# Patient Record
Sex: FEMALE | Race: WHITE | NOT HISPANIC OR LATINO | Employment: OTHER | URBAN - METROPOLITAN AREA
[De-identification: names, ages, dates, MRNs, and addresses within clinical notes are randomized per-mention and may not be internally consistent; named-entity substitution may affect disease eponyms.]

---

## 2017-01-18 ENCOUNTER — RECORDS - HEALTHEAST (OUTPATIENT)
Dept: LAB | Facility: CLINIC | Age: 82
End: 2017-01-18

## 2017-01-18 LAB — HBA1C MFR BLD: 7.2 % (ref 4.2–6.1)

## 2017-04-24 ENCOUNTER — HOSPITAL ENCOUNTER (EMERGENCY)
Facility: CLINIC | Age: 82
Discharge: HOME | End: 2017-04-24
Attending: NURSE PRACTITIONER | Admitting: NURSE PRACTITIONER
Payer: MEDICARE

## 2017-04-24 ENCOUNTER — APPOINTMENT (OUTPATIENT)
Dept: CT IMAGING | Facility: CLINIC | Age: 82
End: 2017-04-24
Attending: NURSE PRACTITIONER
Payer: MEDICARE

## 2017-04-24 VITALS
OXYGEN SATURATION: 98 % | DIASTOLIC BLOOD PRESSURE: 72 MMHG | TEMPERATURE: 97.6 F | BODY MASS INDEX: 23.42 KG/M2 | HEART RATE: 87 BPM | WEIGHT: 154 LBS | RESPIRATION RATE: 18 BRPM | SYSTOLIC BLOOD PRESSURE: 139 MMHG
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
## 2017-04-24 DIAGNOSIS — T14.8XXA HEMATOMA: ICD-10-CM

## 2017-04-24 DIAGNOSIS — S70.01XA CONTUSION OF RIGHT HIP, INITIAL ENCOUNTER: ICD-10-CM

## 2017-04-24 LAB
ALBUMIN UR-MCNC: NEGATIVE MG/DL
ANION GAP SERPL CALCULATED.3IONS-SCNC: 8 MMOL/L (ref 3–14)
APPEARANCE UR: CLEAR
BILIRUB UR QL STRIP: NEGATIVE
BUN SERPL-MCNC: 25 MG/DL (ref 7–30)
CALCIUM SERPL-MCNC: 8.7 MG/DL (ref 8.5–10.1)
CHLORIDE SERPL-SCNC: 101 MMOL/L (ref 94–109)
CO2 SERPL-SCNC: 26 MMOL/L (ref 20–32)
COLOR UR AUTO: YELLOW
CREAT SERPL-MCNC: 0.99 MG/DL (ref 0.52–1.04)
ERYTHROCYTE [DISTWIDTH] IN BLOOD BY AUTOMATED COUNT: 13.5 % (ref 10–15)
GFR SERPL CREATININE-BSD FRML MDRD: 52 ML/MIN/1.7M2
GLUCOSE SERPL-MCNC: 141 MG/DL (ref 70–99)
GLUCOSE UR STRIP-MCNC: NEGATIVE MG/DL
HCT VFR BLD AUTO: 32.1 % (ref 35–47)
HGB BLD-MCNC: 10.6 G/DL (ref 11.7–15.7)
HGB UR QL STRIP: NEGATIVE
INTERPRETATION ECG - MUSE: NORMAL
KETONES UR STRIP-MCNC: NEGATIVE MG/DL
LEUKOCYTE ESTERASE UR QL STRIP: ABNORMAL
MCH RBC QN AUTO: 28.6 PG (ref 26.5–33)
MCHC RBC AUTO-ENTMCNC: 33 G/DL (ref 31.5–36.5)
MCV RBC AUTO: 87 FL (ref 78–100)
MUCOUS THREADS #/AREA URNS LPF: PRESENT /LPF
NITRATE UR QL: NEGATIVE
PH UR STRIP: 6 PH (ref 5–7)
PLATELET # BLD AUTO: 207 10E9/L (ref 150–450)
POTASSIUM SERPL-SCNC: 4.3 MMOL/L (ref 3.4–5.3)
RBC # BLD AUTO: 3.7 10E12/L (ref 3.8–5.2)
RBC #/AREA URNS AUTO: 1 /HPF (ref 0–2)
SODIUM SERPL-SCNC: 135 MMOL/L (ref 133–144)
SP GR UR STRIP: 1.01 (ref 1–1.03)
SQUAMOUS #/AREA URNS AUTO: <1 /HPF (ref 0–1)
TROPONIN I SERPL-MCNC: 0.02 UG/L (ref 0–0.04)
URN SPEC COLLECT METH UR: ABNORMAL
UROBILINOGEN UR STRIP-MCNC: 0 MG/DL (ref 0–2)
WBC # BLD AUTO: 7.2 10E9/L (ref 4–11)
WBC #/AREA URNS AUTO: 4 /HPF (ref 0–2)

## 2017-04-24 PROCEDURE — 96361 HYDRATE IV INFUSION ADD-ON: CPT

## 2017-04-24 PROCEDURE — 80048 BASIC METABOLIC PNL TOTAL CA: CPT | Performed by: NURSE PRACTITIONER

## 2017-04-24 PROCEDURE — 81001 URINALYSIS AUTO W/SCOPE: CPT | Performed by: NURSE PRACTITIONER

## 2017-04-24 PROCEDURE — 25000128 H RX IP 250 OP 636: Performed by: NURSE PRACTITIONER

## 2017-04-24 PROCEDURE — 99285 EMERGENCY DEPT VISIT HI MDM: CPT | Mod: 25

## 2017-04-24 PROCEDURE — 84484 ASSAY OF TROPONIN QUANT: CPT | Performed by: NURSE PRACTITIONER

## 2017-04-24 PROCEDURE — 85027 COMPLETE CBC AUTOMATED: CPT | Performed by: NURSE PRACTITIONER

## 2017-04-24 PROCEDURE — 93005 ELECTROCARDIOGRAM TRACING: CPT

## 2017-04-24 PROCEDURE — 70450 CT HEAD/BRAIN W/O DYE: CPT

## 2017-04-24 PROCEDURE — 96360 HYDRATION IV INFUSION INIT: CPT

## 2017-04-24 RX ADMIN — SODIUM CHLORIDE 500 ML: 9 INJECTION, SOLUTION INTRAVENOUS at 10:50

## 2017-04-24 NOTE — ED AVS SNAPSHOT
[facility] Emergency Department    [address]    [facility], [place] [address]    Phone:  [phone]    Fax:  [phone]                                       Olga Zelaya   MRN: [id number]    Department:  [facility] Emergency Department   Date of Visit:  4/24/2017           Patient Information     Date Of Birth          8/23/1923        Your diagnoses for this visit were:     Fall, initial encounter     Contusion of right hip, initial encounter     Hematoma        You were seen by Tashi Mendoza APRN CNP.      Follow-up Information     Follow up with Joe Pelaez In 1 week.    Specialty:  Family Practice    Why:  if continuned symptoms or sooner if worsening    Contact information:    [facility]  [address] [facility] [address]  [phone]          Follow up with [facility] Emergency Department.    Specialty:  EMERGENCY MEDICINE    Why:  If symptoms worsen    Contact information:    [address]  [facility] [id number] [phone]        Discharge Instructions       Tylenol scheduled for the next 3-5 days. Naproxen, 650-1000 mg of Tylenol. Follow directions.  Ice or heat to area.  I recommend ice in the first 24-48 hours, 3-4 times daily.        Bruises (Contusions)    A contusion is a bruise. A bruise happens when a blow to your body doesn't break the skin but does break blood vessels beneath the skin. Blood leaking from the broken vessels causes redness and swelling. As it heals, your bruise is likely to turn colors like purple, green, and yellow. This is normal. The bruise should fade in 2 or 3 weeks.  Factors that make you more likely to bruise  Almost everyone bruises now and then. Certain people do bruise more easily than others. You're more prone to bruising as you get older. That's because blood vessels become more fragile with age. You're also more likely to bruise if you have a clotting disorder such as  hemophilia or take medications that reduce clotting, including aspirin.  When to go to the emergency room (ER)  Bruises almost always heal on their own without special treatment. But for some people, a bad bruise can be serious. Seek medical care if you:    Have a clotting disorder such as hemophilia.    Have cirrhosis or other serious liver disease.    Take blood-thinning medications such as warfarin (Coumadin).  What to expect in the ER  A doctor will examine your bruise and ask about any health conditions you have. In some cases, you may have a test to check how well your blood clots. Other treatment will depend on your needs.  Follow-up care  Sometimes a bruise gets worse instead of better. It may become larger and more swollen. This can occur when your body walls off a small pool of blood under the skin (hematoma). In very rare cases, your doctor may need to drain excess blood from the area.  Tip:  Apply an ice pack or bag of frozen peas to a bruise (keep a thin cloth between the cold source and your skin). This can help reduce redness and swelling.     0053-5782 [employer]. [address] [place] [address]. All rights reserved. This information is not intended as a substitute for professional medical care. Always follow your healthcare professional's instructions.          Uncertain Causes of Fall  You have had a fall today. But the cause of your fall is not certain. Falls can occur due to slipping, tripping or losing your balance. A fall can also occur from a fainting spell or seizure.  While a fall can happen for a simple reason (tripping over something), falls in elderly people are often caused by a combination of things:    Age-related decline in function with worsening balance, stability, vision, and muscle strength    Chronic illness such as heart arrhythmias, heart valve disease, vascular disease, COPD, diabetes, strokes, arthritis    Effects or side effects of  medicines    Dehydration.    Environmental hazards such as uneven or slippery ground, unfamiliar place, obstacles, uneven surfaces, or slippery ground    Situational factors (related to the activity being done, e.g., rushing to the bathroom)  Because the cause of your fall today is not certain, it is possible that a fainting spell or seizure was the cause. This means that it could happen again, without warning. If you fall again, without a cause, then you should return to this facility promptly to have further tests. Otherwise, follow up with your doctor as explained below.  It is normal to feel sore and tight in your muscles and back the next day, and not just the muscles you initially injured. Remember, all the parts of your body are connected, so while initially one area hurts, the next day another may hurt. Also, when you injure yourself, it causes inflammation, which then causes the muscles to tighten up and hurt more. After the initial worsening, it should gradually improve over the next few days. However, more severe pain should be reported.  Even without a definite head injury, you can still get a concussion. Concussions and even bleeding can still occur, especially if you have had a recent injury or take blood thinner medicine. It is not unusual to have a mild headache and feel tired and even nauseous or dizzy.  Home care    Rest today and resume your normal activities as soon as you are feeling back to normal. It is best to remain with someone who can check on you for the next 24 hours to watch for another episode of falling.    If you were injured during the fall, follow the advice from your doctor regarding care of your injury.     If you become light-headed or dizzy, lie down immediately or sit and lean forward with your head down.    As a precaution, do not drive a car or operate dangerous equipment, do not take a bath alone (use a shower instead) and do not swim alone until you see your doctor. A  condition causing fainting or seizures must be ruled out before resuming these activities.    You may use acetaminophen or ibuprofen to control pain, unless another pain medicine was prescribed. If you have chronic liver or kidney disease or ever had a stomach ulcer or gastrointestinal bleeding, talk with your doctor before using these medicines.    Keep your appointments for any further testing that may have been scheduled for you.  Follow-up care  Follow up with your healthcare provider, or as advised.  If X-rays or CT scan were done, you will be notified if there is a change in the reading, especially if it affects treatment.  Call 911  Call 911 if any of these occur:    Trouble breathing    Confused or difficulty arousing    Fainting or loss of consciousness    Rapid or very slow heart rate    Seizure    Difficulty with speech or vision, weakness of an arm or leg    Difficulty walking or talking, loss of balance, numbness or weakness in one side of your body, facial droop  When to seek medical advice  Call your healthcare provider right away if any of these occur:    Another unexplained fall    Dizziness    Severe headache    Nausea and vomiting    Blood in vomit, stools (black or red color)    2443-6179 [employer]. [address] [place] [address]. All rights reserved. This information is not intended as a substitute for professional medical care. Always follow your healthcare professional's instructions.            24 Hour Appointment Hotline       To make an appointment at any [facility], call [phone] ([phone]). If you don't have a family doctor or clinic, we will help you find one. [facility] are conveniently located to serve the needs of you and your family.             Review of your medicines      Our records show that you are taking the medicines listed below. If these are incorrect, please call your family doctor or clinic.        Dose / Directions Last  dose taken    acetaminophen 325 MG tablet   Commonly known as:  TYLENOL   Dose:  2 tablet   Quantity:  100 tablet        Take 2 tablets by mouth every 6 hours as needed for pain.   Refills:  0        ascorbic acid 500 MG tablet   Commonly known as:  VITAMIN C   Dose:  500 mg        Take 500 mg by mouth daily.   Refills:  0        azaTHIOprine 50 MG tablet   Commonly known as:  IMURAN   Dose:  50 mg        Take 50 mg by mouth every morning.   Refills:  0        cholecalciferol 1000 UNIT tablet   Commonly known as:  vitamin D   Dose:  1 tablet        Take 1 tablet by mouth daily.   Refills:  0        insulin glargine 100 UNIT/ML injection   Commonly known as:  LANTUS   Dose:  6 Units        Inject 6 Units Subcutaneous At Bedtime.   Refills:  0        levothyroxine 150 MCG tablet   Commonly known as:  SYNTHROID/LEVOTHROID   Dose:  150 mcg        Take 150 mcg by mouth daily.   Refills:  0        LIPITOR 10 MG tablet   Dose:  10 mg   Generic drug:  atorvastatin        Take 10 mg by mouth daily.   Refills:  0        lisinopril 2.5 MG tablet   Commonly known as:  PRINIVIL/Zestril   Dose:  2.5 mg        Take 2.5 mg by mouth daily.   Refills:  0        metFORMIN 500 MG tablet   Commonly known as:  GLUCOPHAGE   Dose:  1000 mg        Take 1,000 mg by mouth 2 times daily.   Refills:  0                Procedures and tests performed during your visit     Basic metabolic panel    CBC (platelets, no diff)    EKG 12 lead    Head CT w/o contrast    [place] IV    Troponin I    UA reflex to Microscopic      Orders Needing Specimen Collection     None      Pending Results     No orders found from 4/22/2017 to 4/25/2017.            Pending Culture Results     No orders found from 4/22/2017 to 4/25/2017.            Test Results From Your Hospital Stay        4/24/2017 11:00 AM      Component Results     Component Value Ref Range & Units Status    WBC 7.2 4.0 - 11.0 10e9/L Final    RBC Count 3.70 (L) 3.8 - 5.2 10e12/L Final     Hemoglobin 10.6 (L) 11.7 - 15.7 g/dL Final    Hematocrit 32.1 (L) 35.0 - 47.0 % Final    MCV 87 78 - 100 fl Final    MCH 28.6 26.5 - 33.0 pg Final    MCHC 33.0 31.5 - 36.5 g/dL Final    RDW 13.5 10.0 - 15.0 % Final    Platelet Count 207 150 - 450 10e9/L Final         4/24/2017 11:22 AM      Component Results     Component Value Ref Range & Units Status    Sodium 135 133 - 144 mmol/L Final    Potassium 4.3 3.4 - 5.3 mmol/L Final    Chloride 101 94 - 109 mmol/L Final    Carbon Dioxide 26 20 - 32 mmol/L Final    Anion Gap 8 3 - 14 mmol/L Final    Glucose 141 (H) 70 - 99 mg/dL Final    Urea Nitrogen 25 7 - 30 mg/dL Final    Creatinine 0.99 0.52 - 1.04 mg/dL Final    GFR Estimate 52 (L) >60 mL/min/1.7m2 Final    Non  GFR Calc    GFR Estimate If Black 63 >60 mL/min/1.7m2 Final    African American GFR Calc    Calcium 8.7 8.5 - 10.1 mg/dL Final         4/24/2017 11:22 AM      Component Results     Component Value Ref Range & Units Status    Troponin I ES 0.019 0.000 - 0.045 ug/L Final    The 99th percentile for upper reference range is 0.045 ug/L.  Troponin values   in   the range of 0.045 - 0.120 ug/L may be associated with risks of adverse   clinical events.           4/24/2017  1:00 PM      Component Results     Component Value Ref Range & Units Status    Color Urine Yellow  Final    Appearance Urine Clear  Final    Glucose Urine Negative NEG mg/dL Final    Bilirubin Urine Negative NEG Final    Ketones Urine Negative NEG mg/dL Final    Specific Gravity Urine 1.012 1.003 - 1.035 Final    Blood Urine Negative NEG Final    pH Urine 6.0 5.0 - 7.0 pH Final    Protein Albumin Urine Negative NEG mg/dL Final    Urobilinogen mg/dL 0.0 0.0 - 2.0 mg/dL Final    Nitrite Urine Negative NEG Final    Leukocyte Esterase Urine Trace (A) NEG Final    Source Midstream Urine  Final    RBC Urine 1 0 - 2 /HPF Final    WBC Urine 4 (H) 0 - 2 /HPF Final    Squamous Epithelial /HPF Urine <1 0 - 1 /HPF Final    Mucous Urine Present  (A) NEG /LPF Final         4/24/2017 12:55 PM      Narrative     CT OF THE HEAD WITHOUT CONTRAST  4/24/2017 11:49 AM     COMPARISON: Head CT 11/21/2014.    HISTORY: Fall.    TECHNIQUE: 5 mm thick axial CT images of the head were acquired  without IV contrast material.    FINDINGS:  There is moderate diffuse cerebral volume loss. There are  subtle patchy areas of decreased density in the cerebral white matter  bilaterally that are consistent with sequela of chronic small vessel  ischemic disease.     The ventricles and basal cisterns are within normal limits in  configuration given the degree of cerebral volume loss.  There is no  midline shift. There are no extra-axial fluid collections.     No intracranial hemorrhage, mass or recent infarct.    The visualized paranasal sinuses are well-aerated. There is no  mastoiditis. There are no fractures of the visualized bones.         Impression     IMPRESSION:  Diffuse cerebral volume loss and cerebral white matter  changes consistent with chronic small vessel ischemic disease. No  evidence for acute intracranial pathology.      Radiation dose for this scan was reduced using automated exposure  control, adjustment of the mA and/or kV according to patient size, or  iterative reconstruction technique.    ERICK RHODES MD                Clinical Quality Measure: Blood Pressure Screening     Your blood pressure was checked while you were in the emergency department today. The last reading we obtained was  BP: (!) 135/93 . Please read the guidelines below about what these numbers mean and what you should do about them.  If your systolic blood pressure (the top number) is less than 120 and your diastolic blood pressure (the bottom number) is less than 80, then your blood pressure is normal. There is nothing more that you need to do about it.  If your systolic blood pressure (the top number) is 120-139 or your diastolic blood pressure (the bottom number) is 80-89, your blood  "pressure may be higher than it should be. You should have your blood pressure rechecked within a year by a primary care provider.  If your systolic blood pressure (the top number) is 140 or greater or your diastolic blood pressure (the bottom number) is 90 or greater, you may have high blood pressure. High blood pressure is treatable, but if left untreated over time it can put you at risk for heart attack, stroke, or kidney failure. You should have your blood pressure rechecked by a primary care provider within the next 4 weeks.  If your provider in the emergency department today gave you specific instructions to follow-up with your doctor or provider even sooner than that, you should follow that instruction and not wait for up to 4 weeks for your follow-up visit.        Thank you for choosing [facility]       Thank you for choosing [facility] for your care. Our goal is always to provide you with excellent care. Hearing back from our patients is one way we can continue to improve our services. Please take a few minutes to complete the written survey that you may receive in the mail after you visit with us. Thank you!        [facility] Information     [facility] lets you send messages to your doctor, view your test results, renew your prescriptions, schedule appointments and more. To sign up, go to [email or web address] . Click on \"Log in\" on the left side of the screen, which will take you to the Welcome page. Then click on \"Sign up Now\" on the right side of the page.     You will be asked to enter the access code listed below, as well as some personal information. Please follow the directions to create your username and password.     Your access code is: [email or web address]  Expires: 2017  1:27 PM     Your access code will  in 90 days. If you need help or a new code, please call your [facility] clinic or [phone].        Care EveryWhere ID     This is your Care EveryWhere ID. This could be used by other organizations " to access your [facility] medical records  [id number]        After Visit Summary       This is your record. Keep this with you and show to your community pharmacist(s) and doctor(s) at your next visit.

## 2017-04-24 NOTE — ED NOTES
Pt presents to ED reporting at 4am pt had a fall that was unwitnessed, pt reports she does not recall the fall, hx of dementia. Pt has a bruise to right hip and right shoulder. Pt did get x-rays at the facility but sent her for re evaluation. Pt was able to ambulate after the fall. ABCs intact.

## 2017-04-24 NOTE — DISCHARGE INSTRUCTIONS
Tylenol scheduled for the next 3-5 days. Naproxen, 650-1000 mg of Tylenol. Follow directions.  Ice or heat to area.  I recommend ice in the first 24-48 hours, 3-4 times daily.        Bruises (Contusions)    A contusion is a bruise. A bruise happens when a blow to your body doesn't break the skin but does break blood vessels beneath the skin. Blood leaking from the broken vessels causes redness and swelling. As it heals, your bruise is likely to turn colors like purple, green, and yellow. This is normal. The bruise should fade in 2 or 3 weeks.  Factors that make you more likely to bruise  Almost everyone bruises now and then. Certain people do bruise more easily than others. You're more prone to bruising as you get older. That's because blood vessels become more fragile with age. You're also more likely to bruise if you have a clotting disorder such as hemophilia or take medications that reduce clotting, including aspirin.  When to go to the emergency room (ER)  Bruises almost always heal on their own without special treatment. But for some people, a bad bruise can be serious. Seek medical care if you:    Have a clotting disorder such as hemophilia.    Have cirrhosis or other serious liver disease.    Take blood-thinning medications such as warfarin (Coumadin).  What to expect in the ER  A doctor will examine your bruise and ask about any health conditions you have. In some cases, you may have a test to check how well your blood clots. Other treatment will depend on your needs.  Follow-up care  Sometimes a bruise gets worse instead of better. It may become larger and more swollen. This can occur when your body walls off a small pool of blood under the skin (hematoma). In very rare cases, your doctor may need to drain excess blood from the area.  Tip:  Apply an ice pack or bag of frozen peas to a bruise (keep a thin cloth between the cold source and your skin). This can help reduce redness and swelling.     [id number]  [employer]. [address] [place] [address], [place] [address]. All rights reserved. This information is not intended as a substitute for professional medical care. Always follow your healthcare professional's instructions.          Uncertain Causes of Fall  You have had a fall today. But the cause of your fall is not certain. Falls can occur due to slipping, tripping or losing your balance. A fall can also occur from a fainting spell or seizure.  While a fall can happen for a simple reason (tripping over something), falls in elderly people are often caused by a combination of things:    Age-related decline in function with worsening balance, stability, vision, and muscle strength    Chronic illness such as heart arrhythmias, heart valve disease, vascular disease, COPD, diabetes, strokes, arthritis    Effects or side effects of medicines    Dehydration.    Environmental hazards such as uneven or slippery ground, unfamiliar place, obstacles, uneven surfaces, or slippery ground    Situational factors (related to the activity being done, e.g., rushing to the bathroom)  Because the cause of your fall today is not certain, it is possible that a fainting spell or seizure was the cause. This means that it could happen again, without warning. If you fall again, without a cause, then you should return to this facility promptly to have further tests. Otherwise, follow up with your doctor as explained below.  It is normal to feel sore and tight in your muscles and back the next day, and not just the muscles you initially injured. Remember, all the parts of your body are connected, so while initially one area hurts, the next day another may hurt. Also, when you injure yourself, it causes inflammation, which then causes the muscles to tighten up and hurt more. After the initial worsening, it should gradually improve over the next few days. However, more severe pain should be reported.  Even without a definite head injury,  you can still get a concussion. Concussions and even bleeding can still occur, especially if you have had a recent injury or take blood thinner medicine. It is not unusual to have a mild headache and feel tired and even nauseous or dizzy.  Home care    Rest today and resume your normal activities as soon as you are feeling back to normal. It is best to remain with someone who can check on you for the next 24 hours to watch for another episode of falling.    If you were injured during the fall, follow the advice from your doctor regarding care of your injury.     If you become light-headed or dizzy, lie down immediately or sit and lean forward with your head down.    As a precaution, do not drive a car or operate dangerous equipment, do not take a bath alone (use a shower instead) and do not swim alone until you see your doctor. A condition causing fainting or seizures must be ruled out before resuming these activities.    You may use acetaminophen or ibuprofen to control pain, unless another pain medicine was prescribed. If you have chronic liver or kidney disease or ever had a stomach ulcer or gastrointestinal bleeding, talk with your doctor before using these medicines.    Keep your appointments for any further testing that may have been scheduled for you.  Follow-up care  Follow up with your healthcare provider, or as advised.  If X-rays or CT scan were done, you will be notified if there is a change in the reading, especially if it affects treatment.  Call 911  Call 911 if any of these occur:    Trouble breathing    Confused or difficulty arousing    Fainting or loss of consciousness    Rapid or very slow heart rate    Seizure    Difficulty with speech or vision, weakness of an arm or leg    Difficulty walking or talking, loss of balance, numbness or weakness in one side of your body, facial droop  When to seek medical advice  Call your healthcare provider right away if any of these occur:    Another unexplained  fall    Dizziness    Severe headache    Nausea and vomiting    Blood in vomit, stools (black or red color)    6313-5118 [employer]. [address] [place] [address], [place] [address]. All rights reserved. This information is not intended as a substitute for professional medical care. Always follow your healthcare professional's instructions.

## 2017-04-24 NOTE — ED NOTES
Pt given food per providers approval, respirations equal and unlabored. Pt and daughter updated on POC.

## 2017-04-24 NOTE — ED PROVIDER NOTES
History     Chief Complaint:  Fall     HPI The history is limited due to the patient's dementia.     Olga Zelaya is a 89 year old female with a history of dementia, hypertension, hyperlipidemia, and type II diabetes mellitus who presents via EMS for evaluation following a fall. This morning around 0400, the patient was found down on the ground by staff at her living facility after an apparent unwitnessed fall. They are uncertain how long the patient was on the ground prior to being found. Since the fall, the patient has been able to ambulate well and staff at her facility noticed that she had new bruising over her right hip and right shoulder. The patient had a right hip and pelvis X-ray performed at her facility without evidence of a new fracture. Details regarding this study are as below. EMS was then called to bring the patient into the ED for further evaluation. Currently in the ED, the patient complains only of minor pain in her right hip that is worse with movement. Otherwise, the patient denies any present chest pain, shortness of breath, headache, or other significant pain. She denies any recollection of the fall. The patient is not currently anticoagulated.     XR Right Hip with Pelvis: 4/24/2017:  Findings:  Bones/Joints: Increased sclerosis in the right femoral head could relate to a past history of osteonecrosis. There does not appear to be an acute bone process. There appears to be an area of lucency in the one view of the left femoral head which may relate to a more ongoing process of osteonecrosis. This should be correlated to clinical history and findings. There is DJD in the lower lumbar spine.   Soft tissues: Unremarkable.     Impressions:   1. Increase sclerosis in the right femoral head could relate to a past history of osteonecrosis.   2. There does not appear to be an acute bone process.   There appears to be an area of lucency in the one view of the left femoral head which may relate  to a more ongoing process of osteonecrosis. This should be correlated to clinical history and findings.     Cardiac Risk Factors:  CAD:    Negative  Hypertension:   Positive  Hyperlipidemia:  Positive  Diabetes:   Positive  Tobacco use:   Negative  Gender:   Female  Age:    89  Familial Hx of CAD:  Negative      Allergies:  Penicillins   Sulfa drugs      Medications:    acetaminophen (TYLENOL) 325 MG tablet  azathioprine (IMURAN) 50 MG tablet  levothyroxine (SYNTHROID, LEVOTHROID) 150 MCG tablet  atorvastatin (LIPITOR) 10 MG tablet  lisinopril (PRINIVIL,ZESTRIL) 2.5 MG tablet  MetFORmin (GLUCOPHAGE) 500 MG tablet  ascorbic acid (VITAMIN C) 500 MG tablet  cholecalciferol (VITAMIN D) 1000 UNIT tablet  insulin glargine (LANTUS) 100 UNIT/ML injection    Past Medical History:    Dementia  Diabetes mellitus, type II  Hypertension  Hyperlipidemia  Hypothyroid     Past Surgical History:    Appendectomy   Cholecystectomy     Family History:    Diabetes - Mother     Social History:  Tobacco use:    Never smoker  Alcohol use:    Negative  Marital status:       Accompanied to ED by:  EMS      Review of Systems   Unable to perform ROS: Dementia     Physical Exam   First Vitals:  BP: 139/72  Temp: 97.6  F (36.4  C)  Temp src: Oral  Pulse: 87  Resp: 18  SpO2: 98 %  Weight: 69.9 kg (154 lb) (04/24 1027-04/24 1336)     Physical Exam  General: Alert, Mild  discomfort, well kept  Eyes: PERRL, conjunctivae pink no scleral icterus or conjunctival injection  ENT:   Moist mucus membranes, posterior oropharynx clear without erythema or exudates, No lymphadenopathy, Normal voice  Resp:  Lungs clear to auscultation bilaterally, no crackles/rubs/wheezes. Good air movement  CV:  Normal rate and rhythm, no murmurs/rubs/gallops  GI:  Abdomen soft and non-distended.  Normoactive BS.  No tenderness, guarding or rebound, No masses  Skin:  Warm, dry.  No rashes or petechiae  Musculoskeletal: No peripheral edema or calf tenderness, Normal  gross ROM. 9 x 5 cm hematoma and ecchymosis on her right hip.   Neuro: Alert and oriented to person/place/time, normal sensation  Psychiatric: Normal affect, cooperative, good eye contact.     Emergency Department Course   ECG (10:31:19):  Indication: Screening for cardiovascular disease.   Rate 98 bpm. PA interval 190 ms. QRS duration 116 ms. QT/QTc 374/477 ms. P-R-T axes 66 -40 38.   Interpretation: Normal sinus rhythm, Left axis deviation, Septal infarct age undetermined, Abnormal ECG  Agree with computer interpretation. Yes    Interpreted at 1032 by Dr. Bolanos.      Imaging:  Radiographic findings were communicated with the patient who voiced understanding of the findings.    Head CT w/o Contrast:  IMPRESSION:  Diffuse cerebral volume loss and cerebral white matter changes consistent with chronic small vessel ischemic disease. No evidence for acute intracranial pathology.    Per radiology.     Laboratory:  CBC: HGB 10.6 low, o/w WNL (WBC 7.2, )  BMP: Glucose 141 high, GFR Estimate 52 low, o/w WNL   Troponin I 1042: 0.019   UA reflex to Microscopic: Trace leukocyte esterase, WBC 4 high, Mucous present, o/w Negative       Interventions:  1050  mL IV     Emergency Department Course:  Patient was brought to the ED via EMS.     Nursing notes and vitals reviewed.  1030: I performed an exam of the patient as documented above.     Findings and plan explained to the Patient. Patient discharged home with instructions regarding supportive care, medications, and reasons to return. The importance of close follow-up was reviewed.     Impression & Plan      Medical Decision Making:  Olga Zelaya is a 89 year old female who presents for evaluation of possible injury after a fall. She had a unwitnessed fall in her care facility. She does have dementia so was unable to provide significant history. They did do X-rays prior to arrival without indication for fracture, malalignment, or dislocation. Her examination  showed a contusion / hematoma of the right hip. Laboratory studies are non-contributory and within normal limits for the patient. She has a non-acute EKG and normal troponin. Head CT was negative. She was able to ambulate around the department with walker without difficulty. AT this point in time, she appears to be safe and appropriate for discharge. Likely representing a mechanical fall, however unable to fully rule out other source but ACS or CVA appears to be unlikely. She will be discharged back to her care facility and follow up with her primary care provider in the next 5-7 if no improvement, sooner if worsening. Tylenol and ice for discomfort. Will return to the ER if she develops increasing pain, loss of bowel or bladder control, fevers, or for other concerns.     Diagnosis:    ICD-10-CM   1. Fall, initial encounter W19.XXXA   2. Contusion of right hip, initial encounter S70.01XA   3. Hematoma T14.8       Disposition:  Discharged to home.     Arun MILTON, am serving as a scribe at 10:30 AM on 4/24/2017 to document services personally performed by Tashi Mendoza NP, based on my observations and the provider's statements to me.    [facility] EMERGENCY DEPARTMENT       Tashi Mendoza, TAMMI CNP  04/24/17 1538

## 2017-04-24 NOTE — ED AVS SNAPSHOT
[facility] Emergency Department    [address]    [facility] [address]    Phone:  [phone]    Fax:  [phone]                                       Olga Zelaya   MRN: [id number]    Department:  [facility] Emergency Department   Date of Visit:  4/24/2017           After Visit Summary Signature Page     I have received my discharge instructions, and my questions have been answered. I have discussed any challenges I see with this plan with the nurse or doctor.    ..........................................................................................................................................  Patient/Patient Representative Signature      ..........................................................................................................................................  Patient Representative Print Name and Relationship to Patient    ..................................................               ................................................  Date                                            Time    ..........................................................................................................................................  Reviewed by Signature/Title    ...................................................              ..............................................  Date                                                            Time

## 2017-04-24 NOTE — ED NOTES
Pt educated on NS, pt resting in cot quietly, respirations equal and unlabored, call light in reach, pt requesting something to eat, informed we must wait for results before eating

## 2017-04-26 ENCOUNTER — APPOINTMENT (OUTPATIENT)
Dept: CT IMAGING | Facility: CLINIC | Age: 82
DRG: 115 | End: 2017-04-26
Attending: EMERGENCY MEDICINE
Payer: MEDICARE

## 2017-04-26 ENCOUNTER — APPOINTMENT (OUTPATIENT)
Dept: MRI IMAGING | Facility: CLINIC | Age: 82
DRG: 115 | End: 2017-04-26
Attending: INTERNAL MEDICINE
Payer: MEDICARE

## 2017-04-26 ENCOUNTER — APPOINTMENT (OUTPATIENT)
Dept: GENERAL RADIOLOGY | Facility: CLINIC | Age: 82
DRG: 115 | End: 2017-04-26
Attending: EMERGENCY MEDICINE
Payer: MEDICARE

## 2017-04-26 ENCOUNTER — HOSPITAL ENCOUNTER (INPATIENT)
Facility: CLINIC | Age: 82
LOS: 3 days | Discharge: NURSING, REHAB OR LONG-TERM CARE | DRG: 115 | End: 2017-04-30
Attending: EMERGENCY MEDICINE | Admitting: INTERNAL MEDICINE
Payer: MEDICARE

## 2017-04-26 DIAGNOSIS — I67.1 CEREBRAL ANEURYSM, NONRUPTURED: ICD-10-CM

## 2017-04-26 DIAGNOSIS — H54.62 VISION LOSS OF LEFT EYE: Primary | ICD-10-CM

## 2017-04-26 DIAGNOSIS — M25.551 HIP PAIN, RIGHT: ICD-10-CM

## 2017-04-26 DIAGNOSIS — F02.80 LATE ONSET ALZHEIMER'S DISEASE WITHOUT BEHAVIORAL DISTURBANCE (H): ICD-10-CM

## 2017-04-26 DIAGNOSIS — G30.1 LATE ONSET ALZHEIMER'S DISEASE WITHOUT BEHAVIORAL DISTURBANCE (H): ICD-10-CM

## 2017-04-26 DIAGNOSIS — H53.9 VISION CHANGES: ICD-10-CM

## 2017-04-26 LAB
ANION GAP SERPL CALCULATED.3IONS-SCNC: 9 MMOL/L (ref 3–14)
BASOPHILS # BLD AUTO: 0 10E9/L (ref 0–0.2)
BASOPHILS NFR BLD AUTO: 0.4 %
BUN SERPL-MCNC: 34 MG/DL (ref 7–30)
CALCIUM SERPL-MCNC: 8.4 MG/DL (ref 8.5–10.1)
CHLORIDE SERPL-SCNC: 100 MMOL/L (ref 94–109)
CO2 SERPL-SCNC: 27 MMOL/L (ref 20–32)
CREAT SERPL-MCNC: 1.26 MG/DL (ref 0.52–1.04)
CRP SERPL-MCNC: 73.5 MG/L (ref 0–8)
DIFFERENTIAL METHOD BLD: ABNORMAL
EOSINOPHIL # BLD AUTO: 0.2 10E9/L (ref 0–0.7)
EOSINOPHIL NFR BLD AUTO: 3.4 %
ERYTHROCYTE [DISTWIDTH] IN BLOOD BY AUTOMATED COUNT: 14.3 % (ref 10–15)
GFR SERPL CREATININE-BSD FRML MDRD: 40 ML/MIN/1.7M2
GLUCOSE BLDC GLUCOMTR-MCNC: 139 MG/DL (ref 70–99)
GLUCOSE SERPL-MCNC: 139 MG/DL (ref 70–99)
HBA1C MFR BLD: 7.3 % (ref 4.3–6)
HCT VFR BLD AUTO: 30.4 % (ref 35–47)
HGB BLD-MCNC: 9.9 G/DL (ref 11.7–15.7)
IMM GRANULOCYTES # BLD: 0 10E9/L (ref 0–0.4)
IMM GRANULOCYTES NFR BLD: 0.4 %
INTERPRETATION ECG - MUSE: NORMAL
LYMPHOCYTES # BLD AUTO: 1.7 10E9/L (ref 0.8–5.3)
LYMPHOCYTES NFR BLD AUTO: 25.3 %
MCH RBC QN AUTO: 28.6 PG (ref 26.5–33)
MCHC RBC AUTO-ENTMCNC: 32.6 G/DL (ref 31.5–36.5)
MCV RBC AUTO: 88 FL (ref 78–100)
MONOCYTES # BLD AUTO: 0.5 10E9/L (ref 0–1.3)
MONOCYTES NFR BLD AUTO: 6.9 %
NEUTROPHILS # BLD AUTO: 4.3 10E9/L (ref 1.6–8.3)
NEUTROPHILS NFR BLD AUTO: 63.6 %
NRBC # BLD AUTO: 0 10*3/UL
NRBC BLD AUTO-RTO: 0 /100
PLATELET # BLD AUTO: 193 10E9/L (ref 150–450)
POTASSIUM SERPL-SCNC: 4.5 MMOL/L (ref 3.4–5.3)
RBC # BLD AUTO: 3.46 10E12/L (ref 3.8–5.2)
SODIUM SERPL-SCNC: 136 MMOL/L (ref 133–144)
TROPONIN I SERPL-MCNC: NORMAL UG/L (ref 0–0.04)
TSH SERPL DL<=0.005 MIU/L-ACNC: 3.75 MU/L (ref 0.4–4)
WBC # BLD AUTO: 6.8 10E9/L (ref 4–11)

## 2017-04-26 PROCEDURE — 25000132 ZZH RX MED GY IP 250 OP 250 PS 637: Performed by: INTERNAL MEDICINE

## 2017-04-26 PROCEDURE — 70551 MRI BRAIN STEM W/O DYE: CPT

## 2017-04-26 PROCEDURE — 71020 XR CHEST 2 VW: CPT

## 2017-04-26 PROCEDURE — 25500064 ZZH RX 255 OP 636: Performed by: EMERGENCY MEDICINE

## 2017-04-26 PROCEDURE — 84443 ASSAY THYROID STIM HORMONE: CPT | Performed by: EMERGENCY MEDICINE

## 2017-04-26 PROCEDURE — 99291 CRITICAL CARE FIRST HOUR: CPT

## 2017-04-26 PROCEDURE — 93005 ELECTROCARDIOGRAM TRACING: CPT | Mod: 76

## 2017-04-26 PROCEDURE — 25000125 ZZHC RX 250: Performed by: EMERGENCY MEDICINE

## 2017-04-26 PROCEDURE — 70460 CT HEAD/BRAIN W/DYE: CPT

## 2017-04-26 PROCEDURE — A9270 NON-COVERED ITEM OR SERVICE: HCPCS | Mod: GY | Performed by: INTERNAL MEDICINE

## 2017-04-26 PROCEDURE — 93005 ELECTROCARDIOGRAM TRACING: CPT

## 2017-04-26 PROCEDURE — G0378 HOSPITAL OBSERVATION PER HR: HCPCS

## 2017-04-26 PROCEDURE — 80048 BASIC METABOLIC PNL TOTAL CA: CPT | Performed by: EMERGENCY MEDICINE

## 2017-04-26 PROCEDURE — 83036 HEMOGLOBIN GLYCOSYLATED A1C: CPT | Performed by: EMERGENCY MEDICINE

## 2017-04-26 PROCEDURE — 99220 ZZC INITIAL OBSERVATION CARE,LEVL III: CPT | Performed by: INTERNAL MEDICINE

## 2017-04-26 PROCEDURE — 86140 C-REACTIVE PROTEIN: CPT | Performed by: EMERGENCY MEDICINE

## 2017-04-26 PROCEDURE — 85025 COMPLETE CBC W/AUTO DIFF WBC: CPT | Performed by: EMERGENCY MEDICINE

## 2017-04-26 PROCEDURE — 00000146 ZZHCL STATISTIC GLUCOSE BY METER IP

## 2017-04-26 PROCEDURE — 82306 VITAMIN D 25 HYDROXY: CPT | Performed by: EMERGENCY MEDICINE

## 2017-04-26 PROCEDURE — 84484 ASSAY OF TROPONIN QUANT: CPT | Performed by: EMERGENCY MEDICINE

## 2017-04-26 PROCEDURE — 70470 CT HEAD/BRAIN W/O & W/DYE: CPT | Mod: XS

## 2017-04-26 PROCEDURE — 70498 CT ANGIOGRAPHY NECK: CPT

## 2017-04-26 RX ORDER — LEVOTHYROXINE SODIUM 88 UG/1
88 TABLET ORAL DAILY
Status: DISCONTINUED | OUTPATIENT
Start: 2017-04-27 | End: 2017-04-30 | Stop reason: HOSPADM

## 2017-04-26 RX ORDER — IOPAMIDOL 755 MG/ML
117 INJECTION, SOLUTION INTRAVASCULAR ONCE
Status: COMPLETED | OUTPATIENT
Start: 2017-04-26 | End: 2017-04-26

## 2017-04-26 RX ORDER — SENNOSIDES 8.6 MG
1 TABLET ORAL DAILY PRN
COMMUNITY

## 2017-04-26 RX ORDER — MINERAL OIL 100 G/100G
1 OIL RECTAL DAILY PRN
Status: DISCONTINUED | OUTPATIENT
Start: 2017-04-26 | End: 2017-04-30 | Stop reason: HOSPADM

## 2017-04-26 RX ORDER — MINERAL OIL 100 G/100G
1 OIL RECTAL DAILY PRN
Status: ON HOLD | COMMUNITY
End: 2017-11-30

## 2017-04-26 RX ORDER — NITROGLYCERIN 0.4 MG/1
0.4 TABLET SUBLINGUAL EVERY 5 MIN PRN
Status: DISCONTINUED | OUTPATIENT
Start: 2017-04-26 | End: 2017-04-30 | Stop reason: HOSPADM

## 2017-04-26 RX ORDER — ENALAPRILAT 1.25 MG/ML
1.25 INJECTION INTRAVENOUS EVERY 6 HOURS PRN
Status: DISCONTINUED | OUTPATIENT
Start: 2017-04-26 | End: 2017-04-30 | Stop reason: HOSPADM

## 2017-04-26 RX ORDER — ACETAMINOPHEN 500 MG
1000 TABLET ORAL 3 TIMES DAILY
Status: DISCONTINUED | OUTPATIENT
Start: 2017-04-26 | End: 2017-04-30 | Stop reason: HOSPADM

## 2017-04-26 RX ORDER — ASPIRIN 81 MG/1
81 TABLET, CHEWABLE ORAL DAILY
Status: DISCONTINUED | OUTPATIENT
Start: 2017-04-27 | End: 2017-04-30 | Stop reason: HOSPADM

## 2017-04-26 RX ORDER — SENNOSIDES 8.6 MG
1 TABLET ORAL DAILY PRN
Status: DISCONTINUED | OUTPATIENT
Start: 2017-04-26 | End: 2017-04-30 | Stop reason: HOSPADM

## 2017-04-26 RX ORDER — NALOXONE HYDROCHLORIDE 0.4 MG/ML
.1-.4 INJECTION, SOLUTION INTRAMUSCULAR; INTRAVENOUS; SUBCUTANEOUS
Status: DISCONTINUED | OUTPATIENT
Start: 2017-04-26 | End: 2017-04-30 | Stop reason: HOSPADM

## 2017-04-26 RX ORDER — HYDROMORPHONE HYDROCHLORIDE 1 MG/ML
0.2 INJECTION, SOLUTION INTRAMUSCULAR; INTRAVENOUS; SUBCUTANEOUS
Status: DISCONTINUED | OUTPATIENT
Start: 2017-04-26 | End: 2017-04-30 | Stop reason: HOSPADM

## 2017-04-26 RX ADMIN — IOPAMIDOL 117 ML: 755 INJECTION, SOLUTION INTRAVENOUS at 18:11

## 2017-04-26 RX ADMIN — TRAMADOL HYDROCHLORIDE 25 MG: 50 TABLET ORAL at 21:38

## 2017-04-26 RX ADMIN — ACETAMINOPHEN 1000 MG: 500 TABLET, FILM COATED ORAL at 21:38

## 2017-04-26 RX ADMIN — SODIUM CHLORIDE 100 ML: 9 INJECTION, SOLUTION INTRAVENOUS at 18:11

## 2017-04-26 ASSESSMENT — VISUAL ACUITY: OU: BLURRED VISION

## 2017-04-26 NOTE — ED PROVIDER NOTES
"  History     Chief Complaint:  Vision Loss    The history is provided by the EMS personnel, a relative and the patient. History limited by: Patient's dementia.      Olga Zelaya is a 89 year old female with a history of dementia and hypertension who presents to the emergency department via EMS today for evaluation of vision loss. The patient's daughter reports that the patient has been experiencing \"dizzy spells\" for the past week, sustained a fall two days ago, and was seen at a [facility] where she had an unremarkable head CT and hip/pelvis x-ray at her care facility as per below, but continued to complain of chest pain and right hip pain at that time. She went back to her assisted living facility subsequently and today was complaining of chest pain, and vision loss out of her left eye. Here, the patient states that her left eye has been \"blurry\" starting \"about two hours ago\" after physical therapy and can \"only see bright light\" from this eye. Patient reports normal vision in her right eye. She has no obvious speech difficulties, numbness, weakness, or changes from her baseline mental status according to daughter. However, daughter is concerned that her dizzy spells and falls are becoming more frequent and wants to see her admitted to a hospital for further evaluation. The patient is very hard of hearing and is confused as to where she is at this time; limiting the history somewhat. Furthermore, EMS notes that the patient's vital signs were unremarkable en route to the ED, systolic blood pressure 150, blood sugar was 191, and she had normal O2 saturations. She was given 50 mcg of Fentanyl en route as well which seemed to help her hip and chest pain somewhat, but still has pain with deep inspiration.     XR Right Hip with Pelvis: 4/24/2017:    IMPRESSION:  1. Increase sclerosis in the right femoral head could relate to a past history of osteonecrosis.   2. There does not appear to be an acute bone " "process.   There appears to be an area of lucency in the one view of the left femoral head which may relate to a more ongoing process of osteonecrosis. This should be correlated to clinical history and findings    Karthik Vasques, Head CT w/o Contrast, 4/24/2017    IMPRESSION:  Diffuse cerebral volume loss and cerebral white matter changes consistent with chronic small vessel ischemic disease. No evidence for acute intracranial pathology.    Per radiology.     Allergies:  Penicillins   Sulfa drugs       Medications:    acetaminophen (TYLENOL) 325 MG tablet  azathioprine (IMURAN) 50 MG tablet  levothyroxine (SYNTHROID, LEVOTHROID) 150 MCG tablet  atorvastatin (LIPITOR) 10 MG tablet  lisinopril (PRINIVIL,ZESTRIL) 2.5 MG tablet  MetFORmin (GLUCOPHAGE) 500 MG tablet  ascorbic acid (VITAMIN C) 500 MG tablet  cholecalciferol (VITAMIN D) 1000 UNIT tablet  insulin glargine (LANTUS) 100 UNIT/ML injection     Past Medical History:    Dementia  Diabetes mellitus, type II  Hypertension  Hyperlipidemia  Hypothyroid      Past Surgical History:   Appendectomy   Cholecystectomy      Family History:   Diabetes - Mother      Social History:  Tobacco use:    Never smoker  Alcohol use:    Negative  Marital status:       Accompanied to ED by:  EMS and her daughter  PCP: Dr. Joe Pelaez    Review of Systems   Unable to perform ROS: Dementia     Physical Exam   Vitals:  Patient Vitals for the past 24 hrs:   BP Temp Temp src Heart Rate Resp SpO2 Height Weight   04/26/17 2013 (!) 163/97 98.1  F (36.7  C) Axillary 90 20 94 % 1.727 m (5' 8\") 76.2 kg (167 lb 14.4 oz)   04/26/17 1819 (!) 177/91 - - 90 28 97 % - -   04/26/17 1736 - - - 101 24 95 % - -   04/26/17 1730 173/89 98.2  F (36.8  C) Oral 107 (!) 40 97 % - -     Physical Exam  General: Appears well-developed and well-nourished.   Head: No signs of trauma.   Mouth/Throat: Oropharynx is clear and moist.   Eyes: Conjunctivae are normal. Pupils are equal, round, and reactive to " light. Able to count fingers normally from right eye.  Unable to count fingers, but sees color and light from left eye.    Neck: Normal range of motion. No nuchal rigidity. No cervical adenopathy  CV: Normal rate and regular rhythm.    Resp: Effort normal and breath sounds normal. No respiratory distress.   GI: Soft. There is no tenderness or guarding.  Normal bowel sounds.  No CVA tenderness.  MSK: Normal range of motion. no edema. No Calf tenderness.  Neuro: The patient is alert and oriented, but poor historian and very hard of hearing.  PERRLA, EOMI, strength in upper/lower extremities normal and symmetrical.   Sensation normal. Speech normal.  GCS 14  Skin: Skin is warm and dry. No rash noted.   Psych: normal mood and affect. behavior is normal.       Emergency Department Course     ECG:  ECG taken at 1733, ECG read at 1733  Poor data quality, interpretation may be adversely affected  Normal sinus rhythm  Left axis deviation  Pulmonary disease pattern  Septal infarct, age undetermined  Abnormal ECG  Rate 100 bpm. WV interval 198. QRS duration 104. QT/QTc 344/443. P-R-T axes 85 -30 57.    ECG taken at 1742, ECG read at 1747  Normal sinus rhythm  Left axis deviation  Incomplete right bundle branch block  Septal infarct, age undetermined  Abnormal ECG  Rate 84 bpm. WV interval 192. QRS duration 104. QT/QTc 378/446. P-R-T axes 62 -42 49.    Imaging:  Radiology findings were communicated with the patient's daughter who voiced understanding of the findings.    Chest x-ray, 2 views:  The lungs appear grossly clear and probably unchanged,  given differences in projection, when compared to 11/21/2014. Aortic  arch calcification is noted.  Reading per radiology    Head CT w/o contrast:   1. Atrophy and chronic white matter disease.  2. Nothing acute.  3. No interval change.  4. CT angiogram to follow.  Reading per radiology    Head CT with contrast:  Normal CT perfusion of the brain.  Reading per radiology    Head/Neck  CTA:  1. Nothing acute.  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  3. Findings discussed by phone with Dr. Canseco.    Reading per radiology    Laboratory:  Laboratory findings were communicated with the patient's daughter who voiced understanding of the findings.    CBC: HGB: 9.9 (L) o/w WNL. (WBC 6.8, )   BMP: Glucose: 139 (H), BUN: 34 (H), GFR: 40 (L), Calcium: 8.4 (L) (Creatinine 1.26 (H)  Troponin (Collected 1725): <0.015    Emergency Department Course:  Nursing notes and vitals reviewed.  I performed an exam of the patient as documented above.   IV was inserted and blood was drawn for laboratory testing, results above.  The patient was sent for a chest x-ray, head CT w/o contrast, head CT with contrast, and a head/neck CTA while in the emergency department, results above.   1748: I spoke with Dr. Kate Olivares of Neurology regarding patient's presentation, findings, and plan of care.  1855: I spoke with Dr. Ho of the Hospitalist service regarding patient's presentation, findings, and plan of care.  At 1901 the patient was rechecked and the patient and her daughter updated on the results of the patient's laboratory and imaging studies.   I discussed the treatment plan with the patient and her daughter. They expressed understanding of this plan and consented to placement in the observation unit. I discussed the patient with Dr. Ho, who will admit the patient to a monitored bed for further evaluation and treatment.  I personally reviewed the laboratory and imaging results with the patient and her daughter and answered all related questions prior to placement in the observation unit.    Impression & Plan      Medical Decision Making:  Olga Zelaya is a 89 year old female who presents to the emergency department today for evaluation of chest pain and also because of change in vision of her left eye. She sustained a fall a couple days ago and has had some chest pain since that time  which changes with position. Today, apparently she also had blurred vision just to the left eye. Exact time of onset is not clear. There is a question that it may have been two hours prior to arrival as she had not mentioned it to the physical therapist, but the patient is unable to truly determine this. On my evaluation, she was neurologically intact, but did seem to have difficult with vision from the left eye, but not the right. EKG did not show any concerning ST segment changes. I did obtain a chest x-ray which did not show any signs of rib fracture or other signs of acute process. Troponin was negative. Given she has had this pain for a couple of days I have a low suspicion for an acute MI. I did speak with Dr. Kate Olivares of neurology and given the change in vision,which appears to be fairly sudden in onset, she did recommend obtaining a CTA along with a non-contrast CT which was done and shows no signs of a clear acute process. I did consider the diagnosis of retinal artery occlusion, although the description of her symptoms did not sound consistent with it, and given her age both Dr. Olivares and myself felt that the patient would not be a good candidate for TPA at this time. Patient will be admitted to the Hospitalist service for further monitoring and evaluation.    Diagnosis:    ICD-10-CM    1. Vision changes H53.9 CRP inflammation       Scribe Disclosure:  I, Rosendo Spence, am serving as a scribe at 5:33 PM on 4/26/2017 to document services personally performed by Bull Canseco MD, based on my observations and the provider's statements to me.    4/26/2017    EMERGENCY DEPARTMENT         Bull Canseco MD  04/26/17 2106

## 2017-04-26 NOTE — IP AVS SNAPSHOT
` ` Patient Information     Patient Name Sex     Olga Zelaya ([id number]) Female 1923       Room Bed    [facility] 0739-      Patient Demographics     Address Phone    [address] [place] [address] [phone] (Home)  NONE (Work)  [phone] (Mobile) *Preferred*      Patient Ethnicity & Race     Ethnic Group Patient Race    American White      Emergency Contact(s)     Name Relation Home Work Mobile    Linh Welsh Daughter  NONE [phone]      Documents on File        Status Date Received Description       Documents for the Patient    Privacy Notice - [place] Received 12     Insurance Card Received 12     Patient ID Received 12     Consent for Services - Hospital/Clinic Received () 14     Insurance Card Received 12     Advance Directives and Living Will Received 12 [facility] Care Directives  03    Consent for EHR Access  13 Copied from existing Consent for services - IP/ED collected on 2012    [facility] Specified Other       Insurance Card Received 13 BCBS    Insurance Card Received 13 MEDICARE    Consent for EHR Access Received 13     External Medication Information Consent       External Medication Information Consent Accepted 13     Consent for Services - Hospital/Clinic Received () 13     Patient ID Received 14     Insurance Card Received 14     Insurance Card Received 14     Advance Directives and Living Will Received 14 POLST 2013    Consent for Services/Privacy Notice - Hospital/Clinic Received 17     Insurance Card Received 17     Advance Directives and Living Will Received 17 POLST 2016    External Medication Information Consent Accepted (Deleted) 13        Documents for the Encounter    CMS IM for Patient Signature Received 17 1MM    Observation Notice Received 17       Admission Information     Attending Provider  Admitting Provider Admission Type Admission Date/Time    Kayli Vega MD Maresh, Juan Carlos Sunshine, DO Emergency 04/26/17  1722    Discharge Date Hospital Service Auth/Cert Status Service Area     Hospitalist Incomplete [facility] SERVICES    Unit Room/Bed Admission Status       [facility] 0739/0739-01 Admission (Confirmed)       Admission     Complaint    Vision loss of left eye, Vision loss, .      Hospital Account     Name Acct ID Class Status Primary Coverage    Olga Zelaya [id number] Inpatient Open MEDICARE - RR MEDICARE             Guarantor Account (for Hospital Account #[id number])     Name Relation to Pt Service Area Active? Acct Type    Olga Zelaya  FCS Yes Personal/Family    Address Phone          [address] [address] [phone](H)              Coverage Information (for Hospital Account #[id number])     1. MEDICARE/RR MEDICARE      F/O Payor/Plan Precert #    MEDICARE/RR MEDICARE      Subscriber Subscriber #    Olga Zelaya [id number]    Address Phone    [address] [address]  [place] [address] [phone]          2. [place]     F/O Payor/Plan Precert #    [place]     Subscriber Subscriber #    Olga Zelaya [id number]    Address Phone    [address] [address]  [place], [place] [id number] [phone]

## 2017-04-26 NOTE — IP AVS SNAPSHOT
` `     [address] [facility]: [phone]            Medication Administration Report for Olga Zelaya as of 04/30/17 1250   Legend:    Given Hold Not Given Due Canceled Entry Other Actions    Time Time (Time) Time  Time-Action       Inactive    Active    Linked        Medications 04/24/17 04/25/17 04/26/17 04/27/17 04/28/17 04/29/17 04/30/17    acetaminophen (TYLENOL) tablet 1,000 mg  Dose: 1,000 mg Freq: 3 TIMES DAILY Route: PO  Start: 04/26/17 2200   Admin Instructions: Maximum acetaminophen dose from all sources = 75 mg/kg/day not to exceed 4 gram       2138 (1,000 mg)-Given        0810 (1,000 mg)-Given       1610 (1,000 mg)-Given       2227-Hold [C]        0047 (1,000 mg)-Given       0854 (1,000 mg)-Given       1427-Auto Hold       1600-Automatically Held       1702-Unhold       2107 (1,000 mg)-Given        0930 (1,000 mg)-Given       1658 (1,000 mg)-Given       2151 (1,000 mg)-Given        0820 (1,000 mg)-Given       [ ] 1600       [ ] 2200           aspirin chewable tablet 81 mg  Dose: 81 mg Freq: DAILY Route: PO  Start: 04/27/17 0900       0810 (81 mg)-Given        (0854)-Not Given [C]       1427-Auto Hold       1702-Unhold        0930 (81 mg)-Given        (0932)-Not Given           enalaprilat (VASOTEC) injection 1.25 mg  Dose: 1.25 mg Freq: EVERY 6 HOURS PRN Route: IV  PRN Reason: high blood pressure  PRN Comment: Systolic Blood Pressure greater than 200 mmHg or Diastolic Blood Pressure greater than 110 mmHg  Start: 04/26/17 2014        1427-Auto Hold       1702-Unhold             glucose 40 % gel 15-30 g  Dose: 15-30 g Freq: EVERY 15 MIN PRN Route: PO  PRN Reason: low blood sugar  Start: 04/27/17 1220   Admin Instructions: Give 15 g for BG 51 to 69 mg/dL IF patient is conscious and able to swallow. Give 30 g for BG less than or equal to 50 mg/dL IF patient is conscious and able to swallow. Do NOT give glucose gel via enteral tube.  IF patient has enteral tube: give apple juice  120 mL (4 oz or 15 g of CHO) via enteral tube for BG 51 to 69 mg/dL.  Give apple juice 240 mL (8 oz or 30 g of CHO) via enteral tube for BG less than or equal to 50 mg/dL.         1427-Auto Hold       1702-Unhold            Or  dextrose 50 % injection 25-50 mL  Dose: 25-50 mL Freq: EVERY 15 MIN PRN Route: IV  PRN Reason: low blood sugar  Start: 04/27/17 1220   Admin Instructions: Use if have IV access, BG less than 70 mg/dL and meet dose criteria below:  Dose if conscious and alert (or disorientated) and NPO = 25 mL  Dose if unconscious / not alert = 50 mL  Vesicant.         1427-Auto Hold       1702-Unhold            Or  glucagon injection 1 mg  Dose: 1 mg Freq: EVERY 15 MIN PRN Route: SC  PRN Reason: low blood sugar  PRN Comment: May repeat x 1 only  Start: 04/27/17 1220   Admin Instructions: May give SQ or IM. IF BG less than or equal to 50 mg/dL and no IV access.  ONLY use glucagon IF patient has NO IV access AND is UNABLE to swallow.         1427-Auto Hold       1702-Unhold             HYDROmorphone (PF) (DILAUDID) injection 0.2 mg  Dose: 0.2 mg Freq: EVERY 2 HOURS PRN Route: IV  PRN Reason: severe pain  Start: 04/26/17 2017 1427-Auto Hold       1702-Unhold             insulin aspart (NovoLOG) inj (RAPID ACTING)  Dose: 1-3 Units Freq: AT BEDTIME Route: SC  Start: 04/27/17 2200   Admin Instructions: LOW INSULIN RESISTANCE DOSING    Do Not give Bedtime Correction Insulin if BG less than 200.   For  - 299 give 1 unit.   For  - 399 give 2 units   For BG greater than or equal 400 give 3 units.   Notify provider if glucose greater than or equal to 350 mg/dL after administration of correction dose.  If given at mealtime, must be administered 5 min before meal or immediately after.        (2149)-Not Given [C]        1427-Auto Hold       1702-Unhold       (1715)-Not Given       (2113)-Not Given        (2118)-Not Given [C]        [ ] 2200           insulin aspart (NovoLOG) inj (RAPID ACTING)  Dose: 1-3  Units Freq: 3 TIMES DAILY BEFORE MEALS Route: SC  Start: 04/27/17 1230   Admin Instructions: Correction Scale - LOW INSULIN RESISTANCE DOSING     Do Not give Correction Insulin if Pre-Meal BG less than 140.   For Pre-Meal  - 239 give 1 unit.   For Pre-Meal  - 339 give 2 units.   For Pre-Meal BG greater than or equal to 340 give 3 units.   To be given with prandial insulin, and based on pre-meal blood glucose.   Notify provider if glucose greater than or equal to 350 mg/dL after administration of correction dose.  If given at mealtime, must be administered 5 min before meal or immediately after.        1253 (1 Units)-Given [C]       1739 (1 Units)-Given [C]        (0822)-Not Given [C]       (1238)-Not Given [C]       1427-Auto Hold       1700-Automatically Held       1702-Unhold        (0912)-Not Given       (1357)-Not Given       (1734)-Not Given        (0806)-Not Given       (1244)-Not Given       [ ] 1700           levothyroxine (SYNTHROID/LEVOTHROID) tablet 88 mcg  Dose: 88 mcg Freq: DAILY Route: PO  Start: 04/27/17 0900       0810 (88 mcg)-Given        0854 (88 mcg)-Given       1427-Auto Hold       1702-Unhold        0930 (88 mcg)-Given        0820 (88 mcg)-Given           magnesium hydroxide (MILK OF MAGNESIA) suspension 30 mL  Dose: 30 mL Freq: DAILY PRN Route: PO  PRN Reasons: constipation,heartburn  Start: 04/26/17 2011   Admin Instructions: Shake well.        1123 (30 mL)-Given        1427-Auto Hold       1702-Unhold             Medication Instruction  Freq: CONTINUOUS PRN Route: XX  Start: 04/26/17 2012   Admin Instructions: No D5W IV solutions unless patient hypoglycemic.  Pharmacy to remove all dextrose from iv fluid orders as able.               mineral oil enema 1 enema  Dose: 1 enema Freq: DAILY PRN Route: RE  PRN Reason: constipation  Start: 04/26/17 2011        1427-Auto Hold       1702-Unhold             naloxone (NARCAN) injection 0.1-0.4 mg  Dose: 0.1-0.4 mg Freq: EVERY 2 MIN PRN  Route: IV  PRN Reason: opioid reversal  Start: 04/26/17 2021   Admin Instructions: For respiratory rate LESS than or EQUAL to 8.  Partial reversal dose:  0.1 mg titrated q 2 minutes for Analgesia Side Effects Monitoring Sedation Level of 3 (frequently drowsy, arousable, drifts to sleep during conversation).Full reversal dose:  0.4 mg bolus for Analgesia Side Effects Monitoring Sedation Level of 4 (somnolent, minimal or no response to stimulation).         1427-Auto Hold       1702-Unhold             nitroglycerin (NITROSTAT) sublingual tablet 0.4 mg  Dose: 0.4 mg Freq: EVERY 5 MIN PRN Route: SL  PRN Reason: chest pain  Start: 04/26/17 2011       1110 (0.4 mg)-Given        1427-Auto Hold       1702-Unhold             ondansetron (ZOFRAN) injection 4 mg  Dose: 4 mg Freq: EVERY 6 HOURS PRN Route: IV  PRN Reasons: nausea,vomiting  Start: 04/27/17 1229   Admin Instructions: Irritant.         0854 (4 mg)-Given       1427-Auto Hold       1702-Unhold             prochlorperazine (COMPAZINE) injection 5 mg  Dose: 5 mg Freq: EVERY 6 HOURS PRN Route: IV  PRN Reasons: nausea,vomiting  Start: 04/27/17 1229        1427-Auto Hold       1702-Unhold             sennosides (SENOKOT) tablet 1 tablet  Dose: 1 tablet Freq: DAILY PRN Route: PO  PRN Reason: constipation  Start: 04/26/17 2011        0854 (1 tablet)-Given       1427-Auto Hold       1702-Unhold         0944 (1 tablet)-Given           traMADol (ULTRAM) half-tab 25 mg  Dose: 25 mg Freq: 3 TIMES DAILY PRN Route: PO  PRN Reason: moderate to severe pain  Start: 04/26/17 2011       0058 (25 mg)-Given       1414 (25 mg)-Given        1427-Auto Hold       1702-Unhold             traMADol (ULTRAM) tablet 50 mg  Dose: 50 mg Freq: 3 TIMES DAILY Route: PO  Start: 04/27/17 1600   Admin Instructions: Hold for lethargy.        1610 (50 mg)-Given       2227-Hold [C]        0047 (50 mg)-Given       0854 (50 mg)-Given       1427-Auto Hold       1600-Automatically Held       1702-Unhold        2107 (50 mg)-Given        0930 (50 mg)-Given       1442 (50 mg)-Given       1600-Hold       2151 (50 mg)-Given        0820 (50 mg)-Given       [ ] 1600       [ ] 2200          Completed Medications  Medications 04/24/17 04/25/17 04/26/17 04/27/17 04/28/17 04/29/17 04/30/17         Dose: 250 mL Freq: ONCE Route: IV  Last Dose: 250 mL (04/27/17 1254)  Start: 04/27/17 1215   End: 04/27/17 1354   Admin Instructions: Repeat x 1.        1254 (250 mL)-New Bag                Dose: 250 mcg Freq: ONCE Route: IV  Start: 04/27/17 1300   End: 04/27/17 1312   Admin Instructions: First cortisol level must be drawn before cosyntropin (CORTROSYN) is given.  Nursing to schedule two cortisol levels: one before dose and one 60 minutes post dose.        1310 (250 mcg)-Given             Discontinued Medications  Medications 04/24/17 04/25/17 04/26/17 04/27/17 04/28/17 04/29/17 04/30/17         Freq: PRN  Start: 04/28/17 1558   End: 04/28/17 1702        1558 (6 mL)-Given       1702-Med Discontinued           Dose: 25-50 mcg Freq: EVERY 2 MIN PRN Route: IV  PRN Reason: other  PRN Comment: acute pain  Start: 04/28/17 1638   End: 04/28/17 1702   Admin Instructions: MAX cumulative dose = 250 mcg.    Use Fentanyl initially, as a short acting agent for acute pain control.  If insufficient, or a longer acting agent is needed, begin Morphine or Hydromorphone if ordered.         1702-Med Discontinued           Dose: 0.3-0.5 mg Freq: EVERY 5 MIN PRN Route: IV  PRN Reason: other  PRN Comment: acute pain.  May administer if Respiratory Rate is greater than 10  Start: 04/28/17 1638   End: 04/28/17 1702   Admin Instructions: If fentanyl is also ordered, use HYDROmorphone if pain control insufficient with fentanyl or a longer acting agent is needed.   Max cumulative dose = 2 mg         1702-Med Discontinued           Dose: 5 mg Freq: 2 TIMES DAILY BEFORE MEALS Route: PO  Start: 04/27/17 0730   End: 04/27/17 1534       0810 (5 mg)-Given       [facility]  "Discontinued            Rate: 100 mL/hr Freq: CONTINUOUS Route: IV  Start: 04/28/17 1645   End: 04/28/17 1702   Admin Instructions: Continue until IV catheter is weaned                1702-Med Discontinued           Rate: 25 mL/hr Freq: CONTINUOUS Route: IV  Start: 04/28/17 1445   End: 04/28/17 1609   Admin Instructions: IF patient NOT on dialysis.         1455 ( )-New Bag       1502 ( )-New Bag       1559 ( )-Anesthesia Volume Adjustment       1609-Med Discontinued           Dose: 1 mL Freq: EVERY 1 HOUR PRN Route: OTHER  PRN Comment: mild pain with VAD insertion or accessing implanted port  Start: 04/28/17 1441   End: 04/28/17 1609   Admin Instructions: Do NOT give if patient has a history of allergy to any local anesthetic or any \"katie\" product. MAX dose 1 mL subcutaneous OR intradermal in divided doses.         1609-Med Discontinued           Freq: PRN  Start: 04/28/17 1556   End: 04/28/17 1702        1556 (6 mL)-Given [C]       1702-Med Discontinued           Dose: 4 mg Freq: EVERY 30 MIN PRN Route: PO  PRN Reason: nausea  Start: 04/28/17 1638   End: 04/28/17 1702   Admin Instructions: MAX total dose = 8 mg, including OR dosing. If not resolved in 15 minutes, then go to step 2 (Prochlorperazine if ordered).         1702-Med Discontinued        Or    Dose: 4 mg Freq: EVERY 30 MIN PRN Route: IV  PRN Reason: nausea  Start: 04/28/17 1638   End: 04/28/17 1702   Admin Instructions: MAX total dose = 8 mg, including OR dosing. If not resolved in 15 minutes, then go to step 2 (Prochlorperazine if ordered).  Irritant.         1702-Med Discontinued           Freq: PRN  Start: 04/28/17 1535   End: 04/28/17 1702        1535 (1,000 mL)-Given [C]       1702-Med Discontinued      Medications 04/24/17 04/25/17 04/26/17 04/27/17 04/28/17 04/29/17 04/30/17        "

## 2017-04-26 NOTE — IP AVS SNAPSHOT
"          [facility] [address]: [phone]                                              INTERAGENCY TRANSFER FORM - LAB / IMAGING / EKG / EMG RESULTS   2017                    Hospital Admission Date: 2017  BE MACEDO   : 1923  Sex: Female        Attending Provider: Kayli Vega MD     Allergies:  Biaxin [Clarithromycin], Codeine, Nabumetone, Penicillins, Seafood, Sulfa Drugs    Infection:  None   Service:  HOSPITALIST    Ht:  1.727 m (5' 8\")   Wt:  76.2 kg (167 lb 14.4 oz)   Admission Wt:  76.2 kg (167 lb 14.4 oz)    BMI:  25.53 kg/m 2   BSA:  1.91 m 2            Patient PCP Information     Provider PCP Type    [facility]         Lab Results - 3 Days      Glucose by meter [[id number]] (Abnormal)  Resulted: 17 1151, Result status: Final result    Ordering provider: Juan Carlos Wheeler DO  17 1144 Resulting lab: POINT OF CARE TEST, GLUCOSE    Specimen Information    Type Source Collected On     17 1144          Components       Value Reference Range Flag Lab   Glucose 139 70 - 99 mg/dL H 170            Glucose by meter [[id number]] (Abnormal)  Resulted: 17 0801, Result status: Final result    Ordering provider: Juan Carlos Wheeler DO  17 0753 Resulting lab: POINT OF CARE TEST, GLUCOSE    Specimen Information    Type Source Collected On     17 0753          Components       Value Reference Range Flag Lab   Glucose 115 70 - 99 mg/dL H 170            Basic metabolic panel [[id number]] (Abnormal)  Resulted: 17 0727, Result status: Final result    Ordering provider: Teodoro Leonard MD  17 0000 Resulting lab: [facility]    Specimen Information    Type Source Collected On   Blood  17 0701          Components       Value Reference Range Flag Lab   Sodium 136 133 - 144 mmol/L  FrStHsLb   Potassium 4.5 3.4 - 5.3 mmol/L  FrStHsLb   Chloride 101 94 - 109 mmol/L  FrStHsLb   Carbon " Dioxide 30 20 - 32 mmol/L  FrStHsLb   Anion Gap 5 3 - 14 mmol/L  FrStHsLb   Glucose 112 70 - 99 mg/dL H FrStHsLb   Urea Nitrogen 22 7 - 30 mg/dL  FrStHsLb   Creatinine 1.07 0.52 - 1.04 mg/dL H FrStHsLb   GFR Estimate 48 >60 mL/min/1.7m2 L FrStHsLb   Comment:  Non  GFR Calc   GFR Estimate If Black 58 >60 mL/min/1.7m2 L FrStHsLb   Comment:  African American GFR Calc   Calcium 8.4 8.5 - 10.1 mg/dL L FrStHsLb            Glucose by meter [[id number]] (Abnormal)  Resulted: 04/30/17 0115, Result status: Final result    Ordering provider: Juan Carlos Wheeler,   04/30/17 0108 Resulting lab: POINT OF CARE TEST, GLUCOSE    Specimen Information    Type Source Collected On     04/30/17 0108          Components       Value Reference Range Flag Lab   Glucose 139 70 - 99 mg/dL H 170            Glucose by meter [[id number]] (Abnormal)  Resulted: 04/29/17 2116, Result status: Final result    Ordering provider: Juan Carlos Wheeler,   04/29/17 2108 Resulting lab: POINT OF CARE TEST, GLUCOSE    Specimen Information    Type Source Collected On     04/29/17 2108          Components       Value Reference Range Flag Lab   Glucose 150 70 - 99 mg/dL H 170            Glucose by meter [[id number]] (Abnormal)  Resulted: 04/29/17 1721, Result status: Final result    Ordering provider: Juan Carlos Wheeler,   04/29/17 1713 Resulting lab: POINT OF CARE TEST, GLUCOSE    Specimen Information    Type Source Collected On     04/29/17 1713          Components       Value Reference Range Flag Lab   Glucose 119 70 - 99 mg/dL H 170            Glucose by meter [[id number]] (Abnormal)  Resulted: 04/29/17 0735, Result status: Final result    Ordering provider: Juan Carlos Wheeler,   04/29/17 0729 Resulting lab: POINT OF CARE TEST, GLUCOSE    Specimen Information    Type Source Collected On     04/29/17 0729          Components       Value Reference Range Flag Lab   Glucose 120 70 - 99 mg/dL H 170            Glucose by meter [[id number]]  (Abnormal)  Resulted: 04/29/17 0147, Result status: Final result    Ordering provider: Juan Carlos Wheeler,   04/29/17 0139 Resulting lab: POINT OF CARE TEST, GLUCOSE    Specimen Information    Type Source Collected On     04/29/17 0139          Components       Value Reference Range Flag Lab   Glucose 130 70 - 99 mg/dL H 170            Glucose by meter [[id number]] (Abnormal)  Resulted: 04/28/17 2126, Result status: Final result    Ordering provider: Juan Carlos Wheeler,   04/28/17 2103 Resulting lab: POINT OF CARE TEST, GLUCOSE    Specimen Information    Type Source Collected On     04/28/17 2103          Components       Value Reference Range Flag Lab   Glucose 140 70 - 99 mg/dL H 170            Glucose by meter [[id number]] (Abnormal)  Resulted: 04/28/17 1706, Result status: Final result    Ordering provider: Juan Carlos Wheeler,   04/28/17 1641 Resulting lab: POINT OF CARE TEST, GLUCOSE    Specimen Information    Type Source Collected On     04/28/17 1641          Components       Value Reference Range Flag Lab   Glucose 108 70 - 99 mg/dL H 170            Surgical pathology exam [[id number]]  Resulted: 04/28/17 1631, Result status: In process    Ordering provider: Jem Jaramillo MD  04/28/17 1554 Resulting lab: [facility]    Specimen Information    Type Source Collected On   Biopsy Artery, Temporal 04/28/17 1549   Comment:  HX OF TEMPORARY LEFT EYE BLINDNESS            Adrenal corticotropin [[id number]]  Resulted: 04/28/17 1335, Result status: Final result    Ordering provider: Kayli Vega MD  04/27/17 1227 Resulting lab: [facility]    Specimen Information    Type Source Collected On   Blood  04/27/17 1305          Components       Value Reference Range Flag Lab   Adrenal Corticotropin 28 <47 pg/mL  51            Glucose by meter [[id number]] (Abnormal)  Resulted: 04/28/17 1211, Result status: Final result    Ordering provider: Juan Carlos Wheeler DO   04/28/17 1205 Resulting lab: POINT OF CARE TEST, GLUCOSE    Specimen Information    Type Source Collected On     04/28/17 1205          Components       Value Reference Range Flag Lab   Glucose 123 70 - 99 mg/dL H 170            Erythrocyte sedimentation rate auto [[id number]] (Abnormal)  Resulted: 04/28/17 0951, Result status: Final result    Ordering provider: Kayli Vega MD  04/28/17 0740 Resulting lab: [facility]    Specimen Information    Type Source Collected On     04/28/17 0740          Components       Value Reference Range Flag Lab   Sed Rate 34 0 - 30 mm/h H FrStHsLb            Basic metabolic panel [[id number]] (Abnormal)  Resulted: 04/28/17 0816, Result status: Final result    Ordering provider: Kayli Vega MD  04/28/17 0001 Resulting lab: [facility]    Specimen Information    Type Source Collected On   Blood  04/28/17 0740          Components       Value Reference Range Flag Lab   Sodium 137 133 - 144 mmol/L  FrStHsLb   Potassium 4.3 3.4 - 5.3 mmol/L  FrStHsLb   Chloride 102 94 - 109 mmol/L  FrStHsLb   Carbon Dioxide 29 20 - 32 mmol/L  FrStHsLb   Anion Gap 6 3 - 14 mmol/L  FrStHsLb   Glucose 111 70 - 99 mg/dL H FrStHsLb   Urea Nitrogen 23 7 - 30 mg/dL  FrStHsLb   Creatinine 1.04 0.52 - 1.04 mg/dL  FrStHsLb   GFR Estimate 49 >60 mL/min/1.7m2 L FrStHsLb   Comment:  Non  GFR Calc   GFR Estimate If Black 60 >60 mL/min/1.7m2 L FrStHsLb   Comment:  African American GFR Calc   Calcium 8.6 8.5 - 10.1 mg/dL  FrStHsLb            Glucose by meter [[id number]] (Abnormal)  Resulted: 04/28/17 0812, Result status: Final result    Ordering provider: Juan Carlos Wheeler DO  04/28/17 0754 Resulting lab: POINT OF CARE TEST, GLUCOSE    Specimen Information    Type Source Collected On     04/28/17 0754          Components       Value Reference Range Flag Lab   Glucose 106 70 - 99 mg/dL H 170            CBC with platelets [[id number]] (Abnormal)   Resulted: 04/28/17 0757, Result status: Final result    Ordering provider: Kayli Vega MD  04/28/17 0001 Resulting lab: [facility]    Specimen Information    Type Source Collected On   Blood  04/28/17 0740          Components       Value Reference Range Flag Lab   WBC 5.2 4.0 - 11.0 10e9/L  FrStHsLb   RBC Count 3.23 3.8 - 5.2 10e12/L L FrStHsLb   Hemoglobin 9.1 11.7 - 15.7 g/dL L FrStHsLb   Hematocrit 28.2 35.0 - 47.0 % L FrStHsLb   MCV 87 78 - 100 fl  FrStHsLb   MCH 28.2 26.5 - 33.0 pg  FrStHsLb   MCHC 32.3 31.5 - 36.5 g/dL  FrStHsLb   RDW 14.2 10.0 - 15.0 %  FrStHsLb   Platelet Count 175 150 - 450 10e9/L  FrStHsLb            Glucose by meter [[id number]] (Abnormal)  Resulted: 04/27/17 2141, Result status: Final result    Ordering provider: Juan Carlos Wheeler DO  04/27/17 2130 Resulting lab: POINT OF CARE TEST, GLUCOSE    Specimen Information    Type Source Collected On     04/27/17 2130          Components       Value Reference Range Flag Lab   Glucose 173 70 - 99 mg/dL H 170            Cortisol [[id number]]  Resulted: 04/27/17 1851, Result status: Final result    Ordering provider: Kayli Vega MD  04/27/17 1227 Resulting lab: [facility]    Specimen Information    Type Source Collected On   Blood  04/27/17 1305          Components       Value Reference Range Flag Lab   Cortisol Serum 8.5 4 - 22 ug/dL  51   Comment:         8 AM Cortisol Reference Range = 4-22 ug/dL   4 PM Cortisol Reference Range = 3-17 ug/dL              Cosyntropin stimulation study post 60 [[id number]]  Resulted: 04/27/17 1851, Result status: Final result    Ordering provider: Kayli Vega MD  04/27/17 1316 Resulting lab: [facility]    Specimen Information    Type Source Collected On   Blood  04/27/17 1420          Components       Value Reference Range Flag Lab   Cortisol Stimulation Post 60 33.4 >20 ug/dL  51    Comment:         Peak serum cortisol should be greater than 20 ug/dL 30-60 minutes post   stimulation.              Glucose by meter [[id number]] (Abnormal)  Resulted: 04/27/17 1736, Result status: Final result    Ordering provider: Juan Carlos Wheeler DO  04/27/17 1714 Resulting lab: POINT OF CARE TEST, GLUCOSE    Specimen Information    Type Source Collected On     04/27/17 1714          Components       Value Reference Range Flag Lab   Glucose 165 70 - 99 mg/dL H 170            Glucose by meter [[id number]] (Abnormal)  Resulted: 04/27/17 1231, Result status: Final result    Ordering provider: Patricio Ho MD  04/27/17 1225 Resulting lab: POINT OF CARE TEST, GLUCOSE    Specimen Information    Type Source Collected On     04/27/17 1225          Components       Value Reference Range Flag Lab   Glucose 145 70 - 99 mg/dL H 170            Erythrocyte sedimentation rate auto [[id number]] (Abnormal)  Resulted: 04/27/17 1107, Result status: Final result    Ordering provider: Patricio Ho MD  04/27/17 0557 Resulting lab: [facility]    Specimen Information    Type Source Collected On     04/27/17 0557          Components       Value Reference Range Flag Lab   Sed Rate 36 0 - 30 mm/h H FrStHsLb            Vitamin D Deficiency [[id number]]  Resulted: 04/27/17 1055, Result status: Final result    Ordering provider: Bull Canseco MD  04/26/17 1725 Resulting lab: [facility]    Specimen Information    Type Source Collected On     04/26/17 1725          Components       Value Reference Range Flag Lab   Vitamin D Deficiency screening 50 20 - 75 ug/L  51   Comment:         Season, race, dietary intake, and treatment affect the concentration of   25-hydroxy-Vitamin D. Values may decrease during winter months and increase   during summer months. Values 20-29 ug/L may indicate Vitamin D insufficiency   and values <20 ug/L may indicate Vitamin D deficiency.   Vitamin D  determination is routinely performed by an immunoassay specific for   25 hydroxyvitamin D3.  If an individual is on vitamin D2 (ergocalciferol)   supplementation, please specify 25 OH vitamin D2 and D3 level determination   by   LCMSMS test VITD23.              Lipid panel reflex to direct LDL [[id number]] (Abnormal)  Resulted: 04/27/17 0638, Result status: Final result    Ordering provider: Patricio Ho MD  04/27/17 0001 Resulting lab: [facility]    Specimen Information    Type Source Collected On   Blood  04/27/17 0557          Components       Value Reference Range Flag Lab   Cholesterol 187 <200 mg/dL  FrStHsLb   Triglycerides 140 <150 mg/dL  FrStHsLb   HDL Cholesterol 51 >49 mg/dL  FrStHsLb   LDL Cholesterol Calculated 108 <100 mg/dL H FrStHsLb   Comment:         Above desirable:  100-129 mg/dl   Borderline High:  130-159 mg/dL   High:             160-189 mg/dL   Very high:       >189 mg/dl     Non HDL Cholesterol 136 <130 mg/dL H FrStHsLb   Comment:         Above Desirable:  130-159 mg/dl   Borderline high:  160-189 mg/dl   High:             190-219 mg/dl   Very high:       >219 mg/dl              Troponin I [[id number]]  Resulted: 04/27/17 0638, Result status: Final result    Ordering provider: Patricio Ho MD  04/27/17 0557 Resulting lab: [facility]    Specimen Information    Type Source Collected On     04/27/17 0557          Components       Value Reference Range Flag Lab   Troponin I ES 0.020 0.000 - 0.045 ug/L  FrStHsLb   Comment:         The 99th percentile for upper reference range is 0.045 ug/L.  Troponin values   in   the range of 0.045 - 0.120 ug/L may be associated with risks of adverse   clinical events.              Glucose by meter [[id number]] (Abnormal)  Resulted: 04/27/17 0231, Result status: Final result    Ordering provider: Patricio Ho MD  04/27/17 0225 Resulting lab: POINT OF CARE TEST, GLUCOSE    Specimen Information    Type Source Collected On      04/27/17 0225          Components       Value Reference Range Flag Lab   Glucose 126 70 - 99 mg/dL H 170            Troponin I [[id number]]  Resulted: 04/27/17 0107, Result status: Final result    Ordering provider: Patricio Ho MD  04/26/17 2129 Resulting lab: [facility]    Specimen Information    Type Source Collected On   Blood  04/27/17 0040          Components       Value Reference Range Flag Lab   Troponin I ES 0.022 0.000 - 0.045 ug/L  [facility]   Comment:         The 99th percentile for upper reference range is 0.045 ug/L.  Troponin values   in   the range of 0.045 - 0.120 ug/L may be associated with risks of adverse   clinical events.              Testing Performed By     Lab - Abbreviation Name Director Address Valid Date Range    [facility] [address] [place] [address] 05/08/15 1057 - Present    51 - Unknown [facility] Unknown [address] [facility] [address] 12/31/14 1010 - Present    88 - Unknown [facility] Unknown Unknown 10/30/02 0000 - Present    170 - Unknown POINT OF CARE TEST, GLUCOSE Unknown Unknown 10/31/11 1114 - Present            Unresulted Labs     None      Encounter-Level Documents:     There are no encounter-level documents.      Order-Level Documents:     There are no order-level documents.

## 2017-04-26 NOTE — IP AVS SNAPSHOT
"` `           [facility] [address]: [phone]                                              INTERAGENCY TRANSFER FORM - NURSING   2017                    Hospital Admission Date: 2017  BE MACEDO   : 1923  Sex: Female        Attending Provider: Kayli Vega MD     Allergies:  Biaxin [Clarithromycin], Codeine, Nabumetone, Penicillins, Seafood, Sulfa Drugs    Infection:  None   Service:  HOSPITALIST    Ht:  1.727 m (5' 8\")   Wt:  76.2 kg (167 lb 14.4 oz)   Admission Wt:  76.2 kg (167 lb 14.4 oz)    BMI:  25.53 kg/m 2   BSA:  1.91 m 2            Patient PCP Information     Provider PCP Type    Joe Benigno [facility]      Current Code Status     Date Active Code Status Order ID Comments User Context       Prior      Code Status History     Date Active Date Inactive Code Status Order ID Comments User Context    2017 10:42 AM  DNR/DNI [id number]  Teodoro Leonard MD Outpatient    2017  9:41 PM 2017 10:42 AM DNR/DNI [id number]  Patricio Ho MD Inpatient    2012 12:08 AM 2012  5:16 PM Full Code [id number]  Gareth Blanchard MD Inpatient      Advance Directives        Does patient have a scanned Advance Directive/ACP document in EPIC?           Yes        Hospital Problems as of 2017              Priority Class Noted POA    Vision loss of left eye Medium  2017 Yes    Vision loss Medium  2017 Yes      Non-Hospital Problems as of 2017              Priority Class Noted    Left elbow fracture   2012    Type 2 diabetes, HbA1c goal < 7% (H)   2012    HTN (hypertension)   2012    Hypothyroidism   2012    Hyperlipidemia LDL goal <100   2012      Immunizations     Name Date      TD (ADULT, 7+) 13          END      ASSESSMENT     Discharge Profile Flowsheet     EXPECTED DISCHARGE     PAS Number  [id number] 17 1139    Expected Discharge Date  17 (pending temp artery bx) 17 0930   " "Senior Linkage Line Referral Placed  04/30/17 04/30/17 1139    GASTROINTESTINAL (ADULT,PEDIATRIC,OB)     Referrals Placed  Emergent Admission to SNF/TCU 04/30/17 1139    GI WDL  ex 04/30/17 0931   SKIN      Abdominal Appearance  rounded 04/30/17 0931   Inspection  Full 04/30/17 0934    Last Bowel Movement  04/26/17 (nothing since admission charted) 04/29/17 2252   Skin WDL  ex 04/30/17 0934    GI Signs/Symptoms  constipation 04/30/17 0934   Skin Integrity  abrasion(s);scab(s);bruise(s) 04/30/17 0934    Passing flatus  yes 04/30/17 0934   Skin areas NOT inspected  Buttock, left;Buttock, right 04/29/17 0122    COMMUNICATION ASSESSMENT     Procedural focused assessment (identify areas inspected)   -- (head - left) 04/28/17 1638    Patient's communication style  spoken language (English or Bilingual);hard of hearing 04/26/17 2047   SAFETY      FINAL RESOURCES     Safety WDL  WDL 04/30/17 1221                 Assessment WDL (Within Defined Limits) Definitions           Safety WDL     Effective: 09/28/15    Row Information: <b>WDL Definition:</b> Bed in low position, wheels locked; call light in reach; upper side rails up x 2; ID band on<br> <font color=\"gray\"><i>Item=AS safety wdl>>List=AS safety wdl>>Version=F14</i></font>      Skin WDL     Effective: 09/28/15    Row Information: <b>WDL Definition:</b> Warm; dry; intact; elastic; without discoloration; pressure points without redness<br> <font color=\"gray\"><i>Item=AS skin wdl>>List=AS skin wdl>>Version=F14</i></font>      Vitals     Vital Signs Flowsheet     VITAL SIGNS     Response to Interventions  Decrease in pain 04/27/17 0706    Temp  98.2  F (36.8  C) 04/30/17 0757   ANALGESIA SIDE EFFECTS MONITORING      Temp src  Oral 04/30/17 0757   Side Effects Monitoring: Respiratory Quality  R 04/29/17 2221    Resp  16 04/30/17 1155   Side Effects Monitoring: Respiratory Depth  N 04/29/17 2221    Heart Rate  80 04/30/17 1155   Side Effects Monitoring: Sedation Level  1 " "04/29/17 2221    Pulse/Heart Rate Source  Monitor 04/30/17 1155   HEIGHT AND WEIGHT      BP  135/83 04/30/17 1155   Height  1.727 m (5' 8\") 04/26/17 2014    BP Location  Left arm 04/30/17 1155   Weight  76.2 kg (167 lb 14.4 oz) 04/26/17 2014    LYING ORTHOSTATIC BP     BSA (Calculated - sq m)  1.91 04/26/17 2014    Lying Orthostatic BP  142/80 04/27/17 1127   BMI (Calculated)  25.58 04/26/17 2014    Lying Orthostatic Pulse  106 bpm 04/27/17 1127   RESPIRATORY MONITORING      SITTING ORTHOSTATIC BP     Respiratory Monitoring (EtCO2)  0 mmHg 04/26/17 1822    Sitting Orthostatic BP  156/93 04/28/17 0853   EKG MONITORING      Sitting Orthostatic Pulse  104 bpm 04/27/17 1127   Cardiac Regularity  Regular 04/26/17 1744    STANDING ORTHOSTATIC BP     Cardiac Rhythm  NSR 04/26/17 1744    Standing Orthostatic BP  98/56 04/27/17 1127   POSITIONING      Standing Orthostatic Pulse  110 bpm 04/27/17 1127   Body Position  lower extremity elevated, left;lower extremity elevated, right;supine, head elevated 04/30/17 0909    OXYGEN THERAPY     Head of Bed (HOB)  HOB at 15 degrees 04/30/17 0909    SpO2  95 % 04/30/17 1155   Positioning/Transfer Devices  pillows 04/30/17 1221    O2 Device  None (Room air) 04/30/17 1155   Chair  Upright in chair 04/30/17 1221    PAIN/COMFORT     DAILY CARE      Patient Currently in Pain  yes 04/29/17 2221   Activity Type  up in chair 04/30/17 1221    Preferred Pain Scale  number (Numeric Rating Pain Scale) 04/27/17 1447   Activity Level of Assistance  assistance, stand-by 04/30/17 1221    Patient's Stated Pain Goal  5 04/27/17 1447   Activity Assistive Device  gait belt 04/30/17 1221    0-10 Pain Scale  2 04/29/17 2151   ECG      Pain Location  Leg 04/29/17 2221   ECG Rhythm  Sinus rhythm;First degree AV block 04/28/17 1710    Pain Orientation  Right 04/29/17 2221   Ectopy  PVC 04/28/17 1710    Pain Descriptors  Shooting 04/27/17 1447   Ectopy Frequency  Rare 04/28/17 0239    Pain Intervention(s)  " Medication (See eMAR) 04/29/17 2221                 Patient Lines/Drains/Airways Status    Active LINES/DRAINS/AIRWAYS     Name: Placement date: Placement time: Site: Days: Last dressing change:    Peripheral IV 04/26/17 Right 04/26/17         4     Incision/Surgical Site 04/28/17 Left Head 04/28/17   1558    1             Patient Lines/Drains/Airways Status    Active PICC/CVC     None            Intake/Output Detail Report     Date Intake     Output   Net    Shift P.O. I.V. IV Piggyback Total Urine Blood Total       Noc 04/28/17 2300 - 04/29/17 0659 220 -- -- 220 -- -- -- 220    Day 04/29/17 0700 - 04/29/17 1459 250 -- -- 250 -- -- -- 250    Cally 04/29/17 1500 - 04/29/17 2259 120 -- -- 120 -- -- -- 120    Noc 04/29/17 2300 - 04/30/17 0659 -- -- -- -- -- -- -- 0    Day 04/30/17 0700 - 04/30/17 1459 180 -- -- 180 -- -- -- 180      Last Void/BM       Most Recent Value    Urine Occurrence 1 at 04/30/2017 1012    Stool Occurrence       Case Management/Discharge Planning     Case Management/Discharge Planning Flowsheet     REFERRAL INFORMATION     Expected Discharge Date  04/29/17 (pending temp artery bx) 04/27/17 0930    Admission Type  observation 04/27/17 1236   FINAL RESOURCES      Arrived From  other (see comments) (jail) 04/27/17 1236   PAS Number  [id number] 04/30/17 1139    Referral Source  case finding;high risk screening;interdisciplinary rounds;physician 04/27/17 1236   Senior Linkage Line Referral Placed  04/30/17 04/30/17 1139    Reason For Consult  discharge planning 04/30/17 1139   Referrals Placed  Emergent Admission to SNF/TCU 04/30/17 1139    Record Reviewed  medical record 04/30/17 1139   ABUSE RISK SCREEN       Assigned to Case  Nicolás Hansen 04/30/17 1139   QUESTION TO PATIENT:  Has a member of your family or a partner(now or in the past) intimidated, hurt, manipulated, or controlled you in any way?  no 04/26/17 1734    LIVING ENVIRONMENT     QUESTION TO PATIENT: Do you feel safe going  back to the place where you are living?  yes 04/26/17 1734    Lives With  alone 04/27/17 1147   OBSERVATION: Is there reason to believe there has been maltreatment of a vulnerable adult (ie. Physical/Sexual/Emotional abuse, self neglect, lack of adequate food, shelter, medical care, or financial exploitation)?  no 04/26/17 1734    Living Arrangements  assisted living 04/27/17 1147   (R) MENTAL HEALTH SUICIDE RISK      Provides Primary Care For  no one 02/20/12 1425   Are you depressed or being treated for depression?  No 04/27/17 1001    COPING/STRESS     HOMICIDE RISK      Major Change/Loss/Stressor  denies;none 04/26/17 2053   Homicidal Ideation  no 04/26/17 1734    EXPECTED DISCHARGE

## 2017-04-26 NOTE — IP AVS SNAPSHOT
MRN:[id number]                      After Visit Summary   4/26/2017    Olga Zelaya    MRN: [id number]           Thank you!     Thank you for choosing [facility] for your care. Our goal is always to provide you with excellent care. Hearing back from our patients is one way we can continue to improve our services. Please take a few minutes to complete the written survey that you may receive in the mail after you visit with us. Thank you!        Patient Information     Date Of Birth          8/23/1923        Designated Caregiver       Most Recent Value    Caregiver    Will someone help with your care after discharge? yes    Name of designated caregiver Linh Welsh    Phone number of caregiver [phone]    Caregiver address [address] [place] [address]      About your hospital stay     You were admitted on:  April 26, 2017 You last received care in the:  [facility] [address] [facility]    You were discharged on:  April 30, 2017       Who to Call     For medical emergencies, please call 911.  For non-urgent questions about your medical care, please call your primary care provider or clinic, [phone]  For questions related to your surgery, please call your surgery clinic        Attending Provider     Provider Specialty    Bull Canseco MD --    Patricio Ho MD Internal Medicine    [facility]       Primary Care Provider Office Phone # Fax #    Joe Pelaez [phone]       [facility] [address] [address]        After Care Instructions     Activity - Up with nursing assistance           Advance Diet as Tolerated       Follow this diet upon discharge: Orders Placed This Encounter      Room Service      Consistent Carbohydrate Diet 2619-1627 Calories: Moderate Consistent CHO (4-6 CHO units/meal)            Fall precautions           General info for SNF       Length of Stay Estimate:  "Short Term Care: Estimated # of Days <30  Condition at Discharge: Stable  Level of care:skilled   Rehabilitation Potential: Fair  Admission H&P remains valid and up-to-date: Yes  Recent Chemotherapy: N/A  Use Nursing Home Standing Orders: Yes            Mantoux instructions       Give two-step Mantoux (PPD) Per Facility Policy Yes                  Follow-up Appointments     Follow Up and recommended labs and tests       Follow up with long term physician.  The following labs/tests are recommended: f/u of pathology of temporal arterities.                  Additional Services     Occupational Therapy Adult Consult       Evaluate and treat as clinically indicated.    Reason: weakness            Physical Therapy Adult Consult       Evaluate and treat as clinically indicated.    Reason:  weakness                  Further instructions from your care team       Pt to d/c to [facility] at 1300 via daughter.     Pending Results     Date and Time Order Name Status Description    4/28/2017 1554 Surgical pathology exam In process             Statement of Approval     Ordered          04/30/17 1049  I have reviewed and agree with all the recommendations and orders detailed in this document.  EFFECTIVE NOW     Approved and electronically signed by:  Teodoro Leonard MD             Admission Information     Date & Time Provider Department Dept. Phone    4/26/2017 Kayli Vega MD [facility] [address] [facility] [phone]      Your Vitals Were     Blood Pressure Temperature Respirations Height Weight Pulse Oximetry    135/83 (BP Location: Left arm) 98.2  F (36.8  C) (Oral) 16 1.727 m (5' 8\") 76.2 kg (167 lb 14.4 oz) 95%    BMI (Body Mass Index)                   25.53 kg/m2           [employer] Information     [employer] lets you send messages to your doctor, view your test results, renew your prescriptions, schedule appointments and more. To sign up, go to [email or web address]) . Click on " "\"Log in\" on the left side of the screen, which will take you to the Welcome page. Then click on \"Sign up Now\" on the right side of the page.     You will be asked to enter the access code listed below, as well as some personal information. Please follow the directions to create your username and password.     Your access code is: [email or web address]  Expires: 2017  1:27 PM     Your access code will  in 90 days. If you need help or a new code, please call your [facility] clinic or [phone].        Care EveryWhere ID     This is your Care EveryWhere ID. This could be used by other organizations to access your [facility] medical records  [id number]           Review of your medicines      CONTINUE these medicines which may have CHANGED, or have new prescriptions. If we are uncertain of the size of tablets/capsules you have at home, strength may be listed as something that might have changed.        Dose / Directions    traMADol 50 MG tablet   Commonly known as:  ULTRAM   This may have changed:    - how much to take  - when to take this  - reasons to take this  - additional instructions  - Another medication with the same name was removed. Continue taking this medication, and follow the directions you see here.   Used for:  Hip pain, right        Dose:  50 mg   Take 1 tablet (50 mg) by mouth every 6 hours as needed for pain maximum 4 tablet(s) per day   Quantity:  20 tablet   Refills:  0         CONTINUE these medicines which have NOT CHANGED        Dose / Directions    ACETAMINOPHEN PO        Dose:  1000 mg   Take 1,000 mg by mouth 3 times daily   Refills:  0       ASCORBIC ACID PO        Dose:  1000 mg   Take 1,000 mg by mouth daily   Refills:  0       ASPIRIN PO        Dose:  81 mg   Take 81 mg by mouth daily   Refills:  0       cholecalciferol 1000 UNIT tablet   Commonly known as:  vitamin D        Dose:  1 tablet   Take 1 tablet by mouth daily.   Refills:  0       ISOSORBIDE MONONITRATE PO        Dose:  5 mg "   Take 5 mg by mouth 2 times daily 1/2 of 10mg tablet   Refills:  0       LEVOTHYROXINE SODIUM PO        Dose:  88 mcg   Take 88 mcg by mouth daily   Refills:  0       magnesium hydroxide 400 MG/5ML suspension   Commonly known as:  MILK OF MAGNESIA        Dose:  30 mL   Take 30 mLs by mouth daily as needed for constipation or heartburn   Refills:  0       mineral oil enema        Dose:  1 enema   Place 1 enema rectally daily as needed for constipation   Refills:  0       NITROGLYCERIN SL        Dose:  0.4 mg   Place 0.4 mg under the tongue every 5 minutes as needed for chest pain   Refills:  0       sennosides 8.6 MG tablet   Commonly known as:  SENOKOT        Dose:  1 tablet   Take 1 tablet by mouth daily as needed for constipation   Refills:  0            Where to get your medicines      Some of these will need a paper prescription and others can be bought over the counter. Ask your nurse if you have questions.     Bring a paper prescription for each of these medications     traMADol 50 MG tablet                Protect others around you: Learn how to safely use, store and throw away your medicines at www.disposemymeds.org.             Medication List: This is a list of all your medications and when to take them. Check marks below indicate your daily home schedule. Keep this list as a reference.      Medications           Morning Afternoon Evening Bedtime As Needed    ACETAMINOPHEN PO   Take 1,000 mg by mouth 3 times daily   Last time this was given:  1,000 mg on 4/30/2017  8:20 AM                                ASCORBIC ACID PO   Take 1,000 mg by mouth daily                                ASPIRIN PO   Take 81 mg by mouth daily   Last time this was given:  81 mg on 4/29/2017  9:30 AM                                cholecalciferol 1000 UNIT tablet   Commonly known as:  vitamin D   Take 1 tablet by mouth daily.                                ISOSORBIDE MONONITRATE PO   Take 5 mg by mouth 2 times daily 1/2 of 10mg  tablet   Last time this was given:  5 mg on 4/27/2017  8:10 AM                                LEVOTHYROXINE SODIUM PO   Take 88 mcg by mouth daily   Last time this was given:  88 mcg on 4/30/2017  8:20 AM                                magnesium hydroxide 400 MG/5ML suspension   Commonly known as:  MILK OF MAGNESIA   Take 30 mLs by mouth daily as needed for constipation or heartburn   Last time this was given:  30 mLs on 4/27/2017 11:23 AM                                mineral oil enema   Place 1 enema rectally daily as needed for constipation                                NITROGLYCERIN SL   Place 0.4 mg under the tongue every 5 minutes as needed for chest pain   Last time this was given:  0.4 mg on 4/27/2017 11:10 AM                                sennosides 8.6 MG tablet   Commonly known as:  SENOKOT   Take 1 tablet by mouth daily as needed for constipation   Last time this was given:  1 tablet on 4/30/2017  9:44 AM                                traMADol 50 MG tablet   Commonly known as:  ULTRAM   Take 1 tablet (50 mg) by mouth every 6 hours as needed for pain maximum 4 tablet(s) per day   Last time this was given:  50 mg on 4/30/2017  8:20 AM

## 2017-04-26 NOTE — IP AVS SNAPSHOT
[facility]    [address]    [place] [address]    Phone:  [phone]                                       After Visit Summary   4/26/2017    Olga Zelaya    MRN: [id number]           After Visit Summary Signature Page     I have received my discharge instructions, and my questions have been answered. I have discussed any challenges I see with this plan with the nurse or doctor.    ..........................................................................................................................................  Patient/Patient Representative Signature      ..........................................................................................................................................  Patient Representative Print Name and Relationship to Patient    ..................................................               ................................................  Date                                            Time    ..........................................................................................................................................  Reviewed by Signature/Title    ...................................................              ..............................................  Date                                                            Time

## 2017-04-26 NOTE — IP AVS SNAPSHOT
` `     [place] [address] [facility]: [phone]                 INTERAGENCY TRANSFER FORM - NOTES (H&P, Discharge Summary, Consults, Procedures, Therapies)   2017                    Hospital Admission Date: 2017  BE MACEDO   : 1923  Sex: Female        Patient PCP Information     Provider PCP Type    Joe Pelaez General         History & Physicals      H&P signed by Patricio Ho MD at 2017  8:49 AM      Author:  Patricio Ho MD Service:  Hospitalist Author Type:  Physician    Filed:  2017  8:49 AM Date of Service:  2017  7:49 PM Note Created:  2017  7:22 AM    Status:  Signed :  Patricio Ho MD (Physician)         DATE OF ADMISSION:  2017      PRIMARY CARE PROVIDER:  Joe Pelaez MD, [facility]      CHIEF COMPLAINT:  Acute onset left eye blurry vision.      HISTORY OF PRESENT ILLNESS:  Be Macedo is a 89-year-old female with history of dementia, hypertension, hyperlipidemia and hypothyroidism.  She lives in an assisted living facility.  Per daughter, the patient has been somewhat lightheaded and dizzy lately.  She apparently suffered from a mechanical fall 2 days ago.  She was seen at [facility] for the fall.  CT head without contrast and hip/pelvis x-ray were negative for acute pathology.  The patient sustained right buttocks contusion and has been having left chest wall and right hip pain since.  Today the patient completed her physical therapy at the assisted living facility and was escorted back to her room when she acutely started to complain of left eye blurry vision.  I am not quite clear whether it is a vision loss or blurry vision.  The patient has dementia and unable to elaborate.  Per patient's daughter, she thinks this blurry vision came on 2 hours prior to arrival to the ED.  No other neurologic deficit.  CT head without contrast, CTA of the head and neck and CT head with contrast  were all negative for acute pathology.  The patient's left eye symptom persisted.  Admit for further work up of stroke.      PAST MEDICAL HISTORY:   1.  Hypertension (not on any meds except for Imdur).   2.  Hyperlipidemia (not on any meds).     3.  Hypothyroidism.   4.  Anemia of chronic disease.   5.  Dementia.      PAST SURGICAL HISTORY:  Appendectomy and cholecystectomy.      ALLERGIES:  Biaxin, codeine, nabumetone, penicillin, seafood and sulfa drugs.      OUTPATIENT MEDICATIONS:   1.  Aspirin 81 mg p.o. daily.   2.  Nitroglycerin sublingual q.5 minutes p.r.n.   3.  Levothyroxine 88 mcg p.o. daily.   4.  Ascorbic acid 1000 mg p.o. daily.   5.  Tylenol 1000 mg p.o. t.i.d.   6.  Isosorbide mononitrate 5 mg p.o. b.i.d.    7.  Tramadol 25 mg p.o. t.i.d. p.r.n.   8.  Mineral oil enema daily p.r.n. constipation.   9.  Milk of magnesia 30 mL daily p.r.n. constipation.   10.  Senna 1 tablet p.o. daily p.r.n. constipation.   11.  Vitamin D3, 1000 units p.o. daily.      SOCIAL HISTORY:  Lives in an assisted living facility.  Never smoker, no alcohol or IV drug use.      FAMILY HISTORY:  Reviewed and noncontributory to patient's current admission.      REVIEW OF SYSTEMS:  Ten organ systems were checked and were all negative other than what was mentioned in HPI.      PHYSICAL EXAMINATION:   VITAL SIGNS:  Temperature 98.2, blood pressure is 173/89, heart rate 90, respirations 24, 95% saturation on room air.   GENERAL:  Hard of hearing, does not follow commands appropriately.  Moderate distress due to right hip and left chest wall pain.   HEENT:  Normocephalic, atraumatic.  Pupils equal, round, reactive to light.  Oropharynx is clear, mucous membranes moist.   NECK:  Supple, no thyromegaly.   CARDIOVASCULAR:  Normal S1, S2, no murmurs, rubs or gallops.   LUNGS:  Clear to auscultation bilaterally.   ABDOMEN:  Soft, good bowel sounds, nontender, no rebound or guarding.   EXTREMITIES:  No cyanosis or clubbing.   LYMPHATICS:  No  edema.   NEUROLOGIC:  Nonfocal.   SKIN:  No rash.   MUSCULOSKELETAL:  No calf tenderness to palpation.   PSYCHIATRIC:  Follows simple commands appropriately, mood and affect are appropriate.        IMAGING STUDIES:  Personally reviewed by me.   CT head without contrast, 04/26/2017, impression:   1.  Atrophy and chronic white matter disease.     2.  No acute intracranial pathology.   3.  No interval change.      CT angio head and neck:  Results pending.      CT head with contrast, 04/26/2017, impression:  Normal CT perfusion of the brain.      Chest x-ray, AP and lateral, 04/26/2017, impression:  The lungs appear grossly clear and probably unchanged, given differences in projection, when compared to 11/21/2014.  The aortic arch calcification is noted.        EKG:  Personally reviewed by me.  Sinus rhythm, ventricular rate 100, no ischemia.      LABORATORY DATA:  Sodium 136, potassium 4.5, chloride 100, carbon dioxide 27, BUN 34, creatinine 1.26, glucose 139, calcium 8.4.  Troponin less than 0.015.  WBC 6.8, hemoglobin 9.9 and platelet count 193,000.        ASSESSMENT AND PLAN:  A 89-year-old female with history of hypertension, hyperlipidemia, diet-controlled diabetes and dementia.  She acutely developed left eye blurry vision earlier tonight, 2 hours prior to coming to the ED.  Workup thus far is negative.     1.   Acute left eye blurry vision (versus vision loss):   Etiology of patient's vision loss is unclear, but differential diagnosis includes acute cerebrovascular accident versus retinal artery occlusion.  CT head without contrast and CT head perfusion are negative.  Will obtain MRI brain tonight.  Echocardiogram with bubble study, hemoglobin A1c and fasting lipid profile will be checked.  Neurology Service will be consulted.     2.   Hypertension:   Resume PTA Imdur and aspirin.  Add p.r.n. labetalol and hydralazine.     3.   Diabetes mellitus type 2:   Appears to be diet-controlled.  Check A1c.     4.    Hyperlipidemia:   Not on any lipid-lowering medications.  Check fasting lipid profile tomorrow morning and consider starting statin.     5.   Acute renal failure:   Likely prerenal.  Admit creatinine is 1.26; however, patient's baseline creatinine is 0.9.  Will gently hydrate her with IV fluid.  Avoid nephrotoxins.     6.   Recent fall resulting in right hip and left-sided chest wall pain:   Resume PTA tramadol and scheduled Tylenol.  I will add IV Dilaudid.     7.   Deep venous thrombosis prophylaxis:   Sequentials.      CODE STATUS:   DNR/DNI per patient's daughter.         PATRICIO HO MD             D: 2017 19:49   T: 2017 21:50   MT: mg      Name:     BE MACEDO   MRN:      [id number]        Account:      [id number]   :      1923           Admitted:     [id number]      Document: [id number]       cc: Joe Pelaez MD[AO1.1]        Revision History        User Key Date/Time User Provider Type Action    > AO1.1 2017  8:49 AM Patricio Ho MD Physician Sign     [N/A] 2017  8:43 AM Patricio Ho MD Physician Edit     [N/A] 2017  7:22 AM Patricio Ho MD Physician Edit            H&P by Patricio Ho MD at 2017  7:30 PM     Author:  Patricio Ho MD Service:  Hospitalist Author Type:  Physician    Filed:  2017  6:28 AM Date of Service:  2017  7:30 PM Note Created:  2017  6:27 AM    Status:  Signed :  Patricio Ho MD (Physician)         H&P dictated.    Patricio Ho MD.   Hospitalist.  [phone], pager.[AO1.1]       Revision History        User Key Date/Time User Provider Type Action    > AO1.1 2017  6:28 AM Patricio Ho MD Physician Sign                     Discharge Summaries      Discharge Summaries by Teodoro Leonard MD at 2017 10:42 AM     Author:  Teodoro Leonard MD Service:  Hospitalist Author Type:  Physician    Filed:  2017 10:49 AM Date of Service:  2017 10:42 AM Note Created:  2017 10:42 AM     Status:  Signed :  Teodoro Leonard MD (Physician)           [facility]  Discharge Summary        Olga Zelaya MRN# [id number]   YOB: 1923 Age: 89 year old     Date of Admission:  4/26/2017  Date of Discharge:  4/30/2017  Admitting Physician:  Juan Carlos Wheeler DO  Discharge Physician:[AP1.1] Teodoro HAN. MD Horacio[AP1.2]  Discharging Service: Hospitalist     Primary Provider: Joe Pelaez  Primary Care Physician Phone Number: [phone]         Discharge Diagnoses/Problem Oriented Hospital Course (Providers):    Olga Zelaya was admitted on 4/26/2017 by Juan Carlos Wheeler DO and I would refer you to their history and physical.  The following problems were addressed during her hospitalization:    Acute left eye blurry vision (versus vision loss):  - Etiology of patient's vision loss is unclear. CT head without contrast and CT head perfusion are negative. Will obtain MRI brain which showed no acute process.  - Neurology consulted appreciated  - s/p temporal artery biopsy, path pending.      Hx of Hypertension with orthostatic Hypotension:  - blood pressure improved  - likely dehydrated, possible Temporal arteritis, r/o adrenal insuffiencey( given falls, hypotension, elevated CRP)  - cosyntropin test is normal   - has p.r.n. labetalol and hydralazine.       Diabetes mellitus type 2:   - A1c 7.3  - Appears to be diet-controlled.   - continue ISS low      Hyperlipidemia:   - Not on any lipid-lowering medications.   - ,       Acute renal failure:   - Likely prerenal. Patient's baseline creatinine is 0.9  - Admit creatinine is 1.26  - Resolved.      Recent fall resulting in right hip and left-sided chest wall pain:  - increase scheduled PTA tramadol 25 -> 50 mg 3/day( with hold for lethergy) & scheduled Tylenol.   - also has IV Dilaudid as needed.     PRIMARY CARE PROVIDER: Joe Pelaez MD, [facility]       CHIEF COMPLAINT: Acute onset  left eye blurry vision.       HISTORY OF PRESENT ILLNESS: Olga Zelaya is a 89-year-old female with history of dementia, hypertension, hyperlipidemia and hypothyroidism. She lives in an assisted living facility. Per daughter, the patient has been somewhat lightheaded and dizzy lately. She apparently suffered from a mechanical fall 2 days ago. She was seen at [facility] for the fall. CT head without contrast and hip/pelvis x-ray were negative for acute pathology. The patient sustained right buttocks contusion and has been having left chest wall and right hip pain since. Today the patient completed her physical therapy at the assisted living facility and was escorted back to her room when she acutely started to complain of left eye blurry vision. I am not quite clear whether it is a vision loss or blurry vision. The patient has dementia and unable to elaborate. Per patient's daughter, she thinks this blurry vision came on 2 hours prior to arrival to the ED. No other neurologic deficit. CT head without contrast, CTA of the head and neck and CT head with contrast were all negative for acute pathology. The patient's left eye symptom persisted. Admit for further work up of stroke.     Patient was admitted as an inpatient due to concern for possible acute stroke.  She was seen by neurology and an MRI was performed which did not show any acute process in the brain.  Her inflammatory markers were elevated and hence it was thought that she may have possible giant cell arteritis.  This was discussed with the family who opted for a biopsy.  The preliminary pathology shows no giant cell arteritis.  She was not treated with steroids over here.  She will need follow-up for the official pathology reports at the TCU.         Code Status:      DNR / DNI         Important Results:      MR brain, pending path results of temporal artery.         Pending Results:[AP1.1]        Unresulted Labs Ordered in the Past 30 Days  of this Admission     Date and Time Order Name Status Description    4/28/2017 1554 Surgical pathology exam In process[AP1.2]                Discharge Instructions and Follow-Up:[AP1.1]      Follow-up Appointments     Follow Up and recommended labs and tests       Follow up with retirement physician.  The following labs/tests are   recommended: f/u of pathology of temporal arterities.[AP1.2]                         Discharge Disposition:      Discharged to rehabilitation facility         Discharge Medications:[AP1.1]        Current Discharge Medication List      CONTINUE these medications which have CHANGED    Details   traMADol (ULTRAM) 50 MG tablet Take 1 tablet (50 mg) by mouth every 6 hours as needed for pain maximum 4 tablet(s) per day  Qty: 20 tablet, Refills: 0    Associated Diagnoses: Hip pain, right         CONTINUE these medications which have NOT CHANGED    Details   ASPIRIN PO Take 81 mg by mouth daily      NITROGLYCERIN SL Place 0.4 mg under the tongue every 5 minutes as needed for chest pain      LEVOTHYROXINE SODIUM PO Take 88 mcg by mouth daily      ASCORBIC ACID PO Take 1,000 mg by mouth daily      ACETAMINOPHEN PO Take 1,000 mg by mouth 3 times daily      ISOSORBIDE MONONITRATE PO Take 5 mg by mouth 2 times daily 1/2 of 10mg tablet      mineral oil enema Place 1 enema rectally daily as needed for constipation      magnesium hydroxide (MILK OF MAGNESIA) 400 MG/5ML suspension Take 30 mLs by mouth daily as needed for constipation or heartburn      sennosides (SENOKOT) 8.6 MG tablet Take 1 tablet by mouth daily as needed for constipation      cholecalciferol (VITAMIN D) 1000 UNIT tablet Take 1 tablet by mouth daily.[AP1.2]                  Allergies:[AP1.1]         Allergies   Allergen Reactions     Biaxin [Clarithromycin]      Codeine      Nabumetone      Penicillins      Seafood      Sulfa Drugs[AP1.2]             Consultations This Hospital Stay:      Consultation during this admission received  "from neurology         Condition and Physical Exam on Discharge:      Discharge condition: Stable   Discharge vitals:[AP1.1] Blood pressure 142/75, temperature 98.2  F (36.8  C), temperature source Oral, resp. rate 16, height 1.727 m (5' 8\"), weight 76.2 kg (167 lb 14.4 oz), SpO2 92 %.[AP1.2]     Gen - Alert, awake, oriented to self in NAD.  Lungs - CTA B.  Heart - RR,S1+S2 nml, no m/g/r.  Abd - soft, NT, ND, + BS.  Ext - no edema.  Neuro - Rebecca, [place]. Ecchymosis RLE.           Discharge Orders for Skilled Facility (from Discharge Orders):[AP1.1]        After Care Instructions     Activity - Up with nursing assistance           Advance Diet as Tolerated       Follow this diet upon discharge: Orders Placed This Encounter      Room Service      Consistent Carbohydrate Diet 0937-2537 Calories: Moderate Consistent CHO (4-6 CHO units/meal)            Fall precautions           General info for SNF       Length of Stay Estimate: Short Term Care: Estimated # of Days <30  Condition at Discharge: Stable  Level of care:skilled   Rehabilitation Potential: Fair  Admission H&P remains valid and up-to-date: Yes  Recent Chemotherapy: N/A  Use Nursing Home Standing Orders: Yes            Mantoux instructions       Give two-step Mantoux (PPD) Per Facility Policy Yes[AP1.2]                           Rehab orders for Skilled Facility (from Discharge Orders):[AP1.1]      Referrals     Future Labs/Procedures    Occupational Therapy Adult Consult     Comments:    Evaluate and treat as clinically indicated.    Reason: weakness    Physical Therapy Adult Consult     Comments:    Evaluate and treat as clinically indicated.    Reason:  weakness[AP1.2]             Discharge Time:      Greater than 30 minutes.        Image Results From This Hospital Stay (For Non-EPIC Providers):        Results for orders placed or performed during the hospital encounter of 04/26/17   Head CT w/o contrast    Narrative    CT HEAD W/O CONTRAST  4/26/2017 6:03 " PM    HISTORY: Fell. Blurred vision left eye.    TECHNIQUE: Head CT without contrast.  Radiation dose for this scan was  reduced using automated exposure control, adjustment of the mA and/or  kV according to patient size, or iterative reconstruction technique.    COMPARISON: 04/24/2017.    FINDINGS: Atrophy and chronic white matter disease.  No hemorrhage,  mass lesion, or focal area of acute infarction identified. Paranasal  sinuses are normal. No bony abnormality.      Impression    IMPRESSION:   1. Atrophy and chronic white matter disease.  2. Nothing acute.  3. No interval change.  4. CT angiogram to follow.    SANDY DIAS MD   Chest XR,  PA & LAT    Narrative    CHEST TWO VIEWS  4/26/2017 6:18 PM     HISTORY: Fall, right-sided chest pain.      Impression    IMPRESSION: The lungs appear grossly clear and probably unchanged,  given differences in projection, when compared to 11/21/2014. Aortic  arch calcification is noted.     NELI BRAR MD   CT Head w Contrast    Narrative    CT BRAIN PERFUSION 4/26/2017 6:23 PM    HISTORY: Fall, loss of vision.    TECHNIQUE: Time sequential axial CT images of the head were acquired  during the administration of intravenous contrast 47 mL [id number]-370.  CTA images of the [place] as well as color perfusion maps of  the brain were created from this time sequential axial source data.  Radiation dose for this scan was reduced using automated exposure  control, adjustment of the mA and/or kV according to patient size, or  iterative reconstruction technique.    COMPARISON: Head CT today.    FINDINGS: There are no focal or regional perfusion defects in the  brain.      Impression    IMPRESSION: Normal CT perfusion of the brain.    SANDY DIAS MD   CTA Angiogram Head Neck    Narrative    CT ANGIOGRAM OF THE HEAD AND NECK WITHOUT AND WITH CONTRAST  4/26/2017  6:12 PM     HISTORY: Fell. Loss of vision.    TECHNIQUE:  Precontrast localizing scans were followed by  CT  angiography with an injection of 70 mL nonionic contrast material IV  with scans through the head and neck.  Images were transferred to a  separate 3-D workstation where multiplanar reformations and 3-D images  were created.  Estimates of carotid stenoses are made relative to the  distal internal carotid artery diameters except as noted.   Radiation  dose for this scan was reduced using automated exposure control,  adjustment of the mA and/or kV according to patient size, or iterative  reconstruction technique.    COMPARISON: Head CT today.    FINDINGS:      [place]: Tiny 2 mm aneurysm of the anterior communicating  artery. Remainder of the [place] is normal.    There is some calcified plaque at the carotid bifurcations but no  measurable stenosis on either side. There is antegrade flow in both  vertebral arteries. No evidence for dissection. Origins of the great  vessels off the aortic arch are intact.      Impression    IMPRESSION:  1. Nothing acute.  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  3. Findings discussed by phone with Dr. Canseco.      SANDY DIAS MD   MR Brain w/o Contrast    Narrative    MR BRAIN WITHOUT CONTRAST 4/26/2017 10:38 PM    HISTORY: Acute left eye blurry vision versus vision loss.    COMPARISON: CT perfusion scan and CT angiogram today.    TECHNIQUE: Noncontrast stroke protocol MR brain.    FINDINGS: Motion artifact. Atrophy. No convincing diffusion  abnormality and, therefore, no evidence for recent infarct. There are  some T2 shine through present, for example on series 602 image 13.  There is mild patchy T2 hyperintensities in the white matter of each  cerebral hemisphere, consistent with chronic small vessel ischemic  disease. Minimal such changes are present in the ajay. No intracranial  hemorrhage or mass.    Sinuses are clear.      Impression    IMPRESSION:  1. Atrophy and chronic white matter disease.  2. Nothing clearly acute.  3. Motion  artifact.    SANDY DIAS MD           Most Recent Lab Results In EPIC (For Non-EPIC Providers):    Most Recent 3 CBC's:[AP1.1]  Recent Labs   Lab Test  04/28/17   0740  04/26/17   1725  04/24/17   1042   WBC  5.2  6.8  7.2   HGB  9.1*  9.9*  10.6*   MCV  87  88  87   PLT  175  193  207[AP1.2]      Most Recent 3 BMP's:[AP1.1]  Recent Labs   Lab Test  04/30/17   0701  04/28/17   0740  04/26/17   1725   NA  136  137  136   POTASSIUM  4.5  4.3  4.5   CHLORIDE  101  102  100   CO2  30  29  27   BUN  22  23  34*   CR  1.07*  1.04  1.26*   ANIONGAP  5  6  9   RADHA  8.4*  8.6  8.4*   GLC  112*  111*  139*[AP1.2]     Most Recent 3 Troponin's:[AP1.1]  Recent Labs   Lab Test  04/27/17   0557  04/27/17   0040  04/26/17   1725   TROPI  0.020  0.022  <0.015  The 99th percentile for upper reference range is 0.045 ug/L.  Troponin values in   the range of 0.045 - 0.120 ug/L may be associated with risks of adverse   clinical events.[AP1.2]       Most Recent 3 INR's:[AP1.1]  Recent Labs   Lab Test  08/09/13   2115  02/19/12   2250   INR  1.00  1.05[AP1.2]     Most Recent 2 LFT's:[AP1.1]  Recent Labs   Lab Test  02/19/12   2250   AST  35   ALT  13   ALKPHOS  78   BILITOTAL  1.1[AP1.2]     Most Recent Cholesterol Panel:[AP1.1]  Recent Labs   Lab Test  04/27/17   0557   CHOL  187   LDL  108*   HDL  51   TRIG  140[AP1.2]     Most Recent 6 Bacteria Isolates From Any Culture (See EPIC Reports for Culture Details):[AP1.1]No lab results found.[AP1.2]  Most Recent TSH, T4 and HgbA1c:[AP1.1]  Recent Labs   Lab Test  04/26/17   1725   TSH  3.75   A1C  7.3*[AP1.2]             Revision History        User Key Date/Time User Provider Type Action    > AP1.2 4/30/2017 10:49 AM Teodoro Leonard MD Physician Sign     AP1.1 4/30/2017 10:42 AM Teodoro Leonard MD Physician                      Consult Notes      Consults by Ronald Bull MD at 4/27/2017  8:21 AM     Author:  Ronald Bull MD Service:  Neurology Author Type:  Physician     Filed:  4/27/2017  8:53 AM Date of Service:  4/27/2017  8:21 AM Note Created:  4/27/2017  8:17 AM    Status:  Signed :  Ronald Bull MD (Physician)     Consult Orders:    1. Neurology IP Consult: Patient to be seen: Routine - within 24 hours; CVA; Consultant may enter orders: Yes [[id number]] ordered by Patricio Ho MD at 04/26/17 [facility]                  [facility] [place]  [facility][AZ1.1]    Vascular Neurology / Stroke Consultation Note[AZ1.2]     Olga Zelaya MRN# [id number]   YOB: 1923 Age: 89 year old    Code Status:DNR/DNI   Date of Admission: 4/26/2017  Date of Consult: 04/27/2017    _________________________________   Primary Care Physician   Joe Pelaez  ______________________________________________         Assessment & Plan   ______________________________________________[AZ1.1]  (H54.62) Vision loss of left eye  (primary encounter diagnosis)[AZ1.3]  --Highly elevated CRP, will check ESR  --Possible GCA.   --Discuss with family possible temporal artery biopsy, given severe dementia  -----Family wants to proceed with vascular biopsy  --MRI brain was unremarkable[AZ1.2]  (I67.1) Cerebral aneurysm, nonruptured[AZ1.3]  --Small aneurysm, should not be addressed at patient's age[AZ1.2]  (G30.1,  F02.80) Late onset Alzheimer's disease without behavioral disturbance[AZ1.3]  --Baseline dementia[AZ1.2]  #. DVT Prophylaxis  --Mechanical   #. PT/OT/Speech  --Start  evaluations  #. Nutrition / GI Prophylaxis  --Per recommendations of speech therapy      #. Code Status:[AZ1.1] DNR / DNI[AZ1.2]    ----------------------------------------------------------------------------------  ----------------------------------------------------------------------------------  Reason for consult: I was asked by [AZ1.1] Vic[AZ1.2] to evaluate this patient for[AZ1.1] vision loss[AZ1.2].    Chief Complaint    ______________________________________________[AZ1.1]  Vision loss  History is obtained from the patient[AZ1.2]    History of Present Illness   ______________________________________________  89-year-old female with history of dementia, hypertension, hyperlipidemia and hypothyroidism. She lives in an assisted living facility. Per daughter, the patient has been somewhat lightheaded and dizzy lately. She apparently suffered from a mechanical fall 2 days prior to admission. She was [facility] at [facility] for the fall. CT head without contrast and hip/pelvis x-ray were negative for acute pathology. The patient sustained right buttocks contusion. She has been having left chest wall and right hip pain since the episode of fall 2 days prior to admission. On 4/26/17 the patient completed her physical therapy at the assisted living facility and was escorted back to her room when she acutely started to complain of left eye vision loss. I am not quite clear whether it is vision loss or blurry vision. The patient has dementia and unable to elaborate. Per patient's daughter, she thinks this blurry vision came on 2 hours prior to arrival to the ED. No other neurologic deficit. CT head without contrast, CTA of the head and neck and CT head with contrast were all negative for acute pathology. The patient's left eye blurry vision (versus vision loss ) persisted. Dr. Olivares of Neurology recommended for the patient to be admitted and to undergo complete stroke workup.   MRI brain did not show any acute stroke  Overnight,[AZ1.1] Not clear if vision is better. She is very hard hearing and demented and can not specify if her vision is better. No weakness.[AZ1.2]   Past Medical History    ______________________________________________  Past Medical History:   Diagnosis Date     Dementia      Diabetes mellitus (H)      High cholesterol      Hypothyroid      Past Surgical History    ______________________________________________  Past Surgical History:   Procedure Laterality Date     APPENDECTOMY       CHOLECYSTECTOMY       Prior to Admission Medications   ______________________________________________  Prior to Admission Medications   Prescriptions Last Dose Informant Patient Reported? Taking?   ACETAMINOPHEN PO 4/26/2017 at x1  Yes Yes   Sig: Take 1,000 mg by mouth 3 times daily   ASCORBIC ACID PO 4/26/2017 at am  Yes Yes   Sig: Take 1,000 mg by mouth daily   ASPIRIN PO 4/26/2017 at am  Yes Yes   Sig: Take 81 mg by mouth daily   ISOSORBIDE MONONITRATE PO 4/26/2017 at am  Yes Yes   Sig: Take 5 mg by mouth 2 times daily 1/2 of 10mg tablet   LEVOTHYROXINE SODIUM PO 4/26/2017 at am  Yes Yes   Sig: Take 88 mcg by mouth daily   NITROGLYCERIN SL 4/26/2017 at Unknown time  Yes Yes   Sig: Place 0.4 mg under the tongue every 5 minutes as needed for chest pain   TRAMADOL HCL PO 4/26/2017 at x1  Yes Yes   Sig: Take 25 mg by mouth 3 times daily    TRAMADOL HCL PO prn  Yes Yes   Sig: Take 25 mg by mouth 3 times daily as needed for moderate to severe pain   cholecalciferol (VITAMIN D) 1000 UNIT tablet 4/26/2017 at am  Yes Yes   Sig: Take 1 tablet by mouth daily.   magnesium hydroxide (MILK OF MAGNESIA) 400 MG/5ML suspension prn  Yes Yes   Sig: Take 30 mLs by mouth daily as needed for constipation or heartburn   mineral oil enema prn  Yes Yes   Sig: Place 1 enema rectally daily as needed for constipation   sennosides (SENOKOT) 8.6 MG tablet prn  Yes Yes   Sig: Take 1 tablet by mouth daily as needed for constipation      Facility-Administered Medications: None     Allergies   Allergies   Allergen Reactions     Biaxin [Clarithromycin]      Codeine      Nabumetone      Penicillins      Seafood      Sulfa Drugs        Social History   ______________________________________________  Social History     Social History     Marital status:      Spouse name: N/A     Number of children: N/A     Years of  "education: N/A     Social History Main Topics     Smoking status: Never Smoker     Smokeless tobacco: None     Alcohol use No     Drug use: No     Sexual activity: Not Asked     Other Topics Concern     None     Social History Narrative       Family History   ______________________________________________  Family History   Problem Relation Age of Onset     DIABETES Mother          Review of Systems   ______________________________________________  Review of systems is not obtainable due to patient factors - mental status and dementia      Physical Exam   ______________________________________________  Weight:167 lbs 14.4 oz; Height:5' 8\"  Temp: 98.3  F (36.8  C) Temp src: Oral BP: 134/75   Heart Rate: 95 Resp: 18 SpO2: 93 % O2 Device: None (Room air)    General Appearance:  No acute distress[AZ1.1]  Neuro:       Mental Status Exam:   Awake, alert, disoriented X3. Speech and language are intact. Mental status is normal, demented       Cranial Nerves: Not able to assess visual acuity due to dementia.  Pupils 2 mm, reactive. EOMI. Face sensation is normal. Face is symmetric. Profound hearing loss. Tongue and uvula are midline. Other CN are normal           Motor:  5/5 X 4. Tone and bulk are normal           Reflexes:  Normal DTR.Toes downgoing.        Sensory:  Grossly intact                Coordination:  Grossly intact         Gait:  Untestable[AZ1.2]   Neck: no nuchal rigidity, normal thyroid. No carotid bruits.    Cardiovascular: Regular rate and rhythm, no m/r/g  Lungs: Clear to auscultation  Abdomen: Soft, not tender, not distended  Extremities: No clubbing, no cyanosis, no edema    Data   ______________________________________________  All Data personally reviewed:       Labs:   CBC RESULTS:     Recent Labs  Lab 04/26/17  1725 04/24/17  1042   WBC 6.8 7.2   RBC 3.46* 3.70*   HGB 9.9* 10.6*   HCT 30.4* 32.1*    207     Basic Metabolic Panel:   Recent Labs   Lab Test  04/26/17   1725  04/24/17   1042  " 11/21/14   1218   NA  136  135  133   POTASSIUM  4.5  4.3  4.3   CHLORIDE  100  101  99   CO2  27  26  30   BUN  34*  25  25   CR  1.26*  0.99  0.91   GLC  139*  141*  128*   RADHA  8.4*  8.7  8.9     Liver panel:  Recent Labs   Lab Test  02/19/12   2250   PROTTOTAL  6.5*   ALBUMIN  3.6   BILITOTAL  1.1   ALKPHOS  78   AST  35   ALT  13     INR:  Recent Labs   Lab Test  08/09/13   2115  02/19/12   2250   INR  1.00  1.05      Lipid Profile:  Recent Labs   Lab Test  04/27/17   0557   CHOL  187   HDL  51   LDL  108*   TRIG  140     Thyroid Panel:  Recent Labs   Lab Test  04/26/17   1725   TSH  3.75      Vitamin B12: No lab results found.   Vitamin D level: No lab results found.  A1C:   Recent Labs   Lab Test  04/26/17   1725  02/19/12   2250   A1C  7.3*  5.9     Troponin I:   Recent Labs   Lab Test  04/27/17   0557  04/27/17   0040  04/26/17   1725  04/24/17   1042   TROPI  0.020  0.022  <0.015  The 99th percentile for upper reference range is 0.045 ug/L.  Troponin values in   the range of 0.045 - 0.120 ug/L may be associated with risks of adverse   clinical events.    0.019     Ammonia: No lab results found.  CK: No lab results found.     CRP inflammation:   Recent Labs   Lab Test  04/26/17   1725   CRP  73.5*     ESR: No lab results found.    PRAFUL: No lab results found.    ANCA: No lab results found.   Drug Screen: No lab results found.  Alcohol level:No lab results found.  UA Results:  Recent Labs   Lab Test  04/24/17   1200   COLOR  Yellow   APPEARANCE  Clear   URINEGLC  Negative   URINEBILI  Negative   URINEKETONE  Negative   SG  1.012   UBLD  Negative   URINEPH  6.0   PROTEIN  Negative   NITRITE  Negative   LEUKEST  Trace*   RBCU  1   WBCU  4*     Most Recent 6 Bacteria Isolates From Any Culture (See EPIC Reports for Culture Details):No lab results found.     Cardiac US:[AZ1.1]   --[AZ1.2]           Imaging:   All imaging studies were reviewed personally  CT head:   1. Atrophy and chronic white matter disease.  2.  Nothing acute.  3. No interval change.  4. CT angiogram to follow.  CTA neck/head:  1. Nothing acute.  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  MRI brain:   1. Atrophy and chronic white matter disease.  2. Nothing clearly acute.  3. Motion artifact.[AZ1.1]               Revision History        User Key Date/Time User Provider Type Action    > AZ1.3 4/27/2017  8:53 AM Ronald Bull MD Physician Sign     AZ1.2 4/27/2017  8:41 AM Ronald Bull MD Physician      AZ1.1 4/27/2017  8:17 AM Ronald Bull MD Physician                      Progress Notes - Physician (Notes from 04/27/17 through 04/30/17)      Progress Notes by Nicolás Hansen at 4/30/2017 11:22 AM     Author:  Nicolás Hansen Service:  (none) Author Type:      Filed:  4/30/2017 11:37 AM Date of Service:  4/30/2017 11:22 AM Note Created:  4/30/2017 11:22 AM    Status:  Signed :  Nicolás Hansen ()         Social Work Progress Note  Pt chart reviewed. Pt discussed in interdisciplinary rounds.     Intervention: Per MD, Linh would like pt to d/c to a TCU due to her not having enough physical support at her nursing home. Kalie spoke with Linh and she explained that she would like a referral sent to facilities within [facility] or [place]. Kalie faxed a referral to [facility] in [facility], and [facility]. Sabine at [facility] informed kalie that they are able to accept pt today.     Sabine also stated that they are unable to assist pt out of the car when she arrives. However, they will bring a w/c out to pt's car.  Kalie updated Linh of the above. Linh confirmed that she is comfortable with assisting pt out of her car. Linh will transport pt at 1300. Kalie faxed d/c orders, and PAS-R to [facility] ([phone]). Kalie to fax pt's script when it is signed by MD.     PAS-RR    D: Per DHS regulation, KALIE completed and submitted PAS-RR to [place] [employer] Direct Connect via the [facility]  LinkAge Line.  PAS-RR confirmation # is : [id number]    I: KAI spoke with pt's daughter and they are aware a PAS-RR has been submitted.  KAI reviewed with Linh that they may be contacted for a follow up appointment within 10 days of hospital discharge if their SNF stay is < 30 days.  Contact information for Senior LinkAge Line was also provided.    A: Linh verbalized understanding.    P: Further questions may be directed to Senior LinkAge Line at #[phone], option #4 for PAS-RR staff.      Team members notified: Bedside RN, CC, [facility],     Plan:  Anticipated Discharge Placement: [facility]  Barriers: None identified at this time.   Follow-up plan: Sw to continue to follow.    OUSMANE Sharp, [facility]  [phone]  1137[NB1.1]     Revision History        User Key Date/Time User Provider Type Action    > NB1.1 4/30/2017 11:37 AM Nicolás Hansen  Sign            Progress Notes by Nicolás Hansen at 4/29/2017 12:48 PM     Author:  Nicolás Hansen Service:  (none) Author Type:      Filed:  4/29/2017  1:00 PM Date of Service:  4/29/2017 12:48 PM Note Created:  4/29/2017 12:48 PM    Status:  Signed :  Nicolás Hansen ()         Social Work Progress Note  Pt chart reviewed. Pt discussed in interdisciplinary rounds.     Intervention: Per MD, pt to likely d/c tomorrow pending biopsy results. Kai contacted Linh and updated her of the above. Linh shared that she would like to arrange added support for pt at her [facility]. Linh would like [facility] services to help provide pt's assistance with all mobility. Kai contacted [facility] private pay nurse ([phone]) and left her a voice message. Kai also provided Linh with this contact info and emailed her additional private duty home care agencies. Linh plans to transport pt at d/c. Kai contacted [facility] on [address] ([phone]) re: pt's current level of care and notification of pt's possible d/c tomorrow. Kai  left them a call back number.     Team members notified: Charge RN    Plan:  Anticipated Discharge Placement: Return to [facility] on [address] ([phone]).   Barriers: None identified at this time.   Follow-up plan: Sw to continue to follow.     Nicolás CartagenaYanelisOUSMANE, KAI  [phone]  1300[NB1.1]     Revision History        User Key Date/Time User Provider Type Action    > NB1.1 4/29/2017  1:00 PM Nicolás Hansen  Sign            Progress Notes by Teodoro Leonard MD at 4/29/2017 10:56 AM     Author:  Teodoro Leonard MD Service:  Hospitalist Author Type:  Physician    Filed:  4/29/2017 11:01 AM Date of Service:  4/29/2017 10:56 AM Note Created:  4/29/2017 10:56 AM    Status:  Signed :  Teodoro Leonard MD (Physician)           [facility]  Hospitalist Progress Note  Name: Olga Zelaya    MRN: [id number]  Provider:  Teodoro Leonard MD  04/29/17    Initial presenting complaint/issue to hospital (Diagnosis): acute L eye blurry vision.         Assessment and Plan:      Summary of Stay: Olga Zelaya is a 89 year old female admitted on 4/26/2017 with history of hypertension, hyperlipidemia, diet-controlled diabetes and dementia. She acutely developed left eye blurry vision earlier tonight, 2 hours prior to coming to the ED. Workup thus far is negative.       Acute left eye blurry vision (versus vision loss):  - Etiology of patient's vision loss is unclear. CT head without contrast and CT head perfusion are negative. Will obtain MRI brain tonight. Echocardiogram with bubble study, hemoglobin A1c and fasting lipid profile will be checked.   - Neurology consulted appreciated  - s/p temporal arteritis, path pending.      Hx of Hypertension with orthostatic Hypotension:  - blood pressure improved  - likely dehydrated, possible Temporal arteritis, r/o adrenal insuffiencey( given falls, hypotension, elevated CRP)  - cosyntropin test is normal   - has p.r.n. labetalol and hydralazine.  "      Diabetes mellitus type 2:   - A1c 7.3  - Appears to be diet-controlled.   - continue ISS low      Hyperlipidemia:   - Not on any lipid-lowering medications.   - ,       Acute renal failure:   - Likely prerenal. Patient's baseline creatinine is 0.9  - Admit creatinine is 1.26  - Resolved.      Recent fall resulting in right hip and left-sided chest wall pain:  - increased pain with exertion/deep breathing  - increase scheduled PTA tramadol 25 -> 50 mg 3/day( with hold for lethergy) & scheduled Tylenol.   - also has IV Dilaudid as needed.       Deep venous thrombosis prophylaxis: Sequentials.       CODE STATUS: DNR/DNI        Dispo : Likely [facility] 4/30.          Interval History:        No worsening chest pain/SOB/N/V/fever/chills/visual changes.                  Physical Exam:      Last Vital Signs:[AP1.1]  Temp: 98.1  F (36.7  C) Temp src: Oral BP: 140/82   Heart Rate: 84 Resp: 16 SpO2: 96 % O2 Device: None (Room air)[AP1.2]      Intake/Output Summary (Last 24 hours) at 04/29/17 1059  Last data filed at 04/29/17 0600   Gross per 24 hour   Intake             1280 ml   Output              451 ml   Net              829 ml     I/O last 3 completed shifts:  In: 1330 [P.O.:630; I.V.:700]  Out: 651 [Urine:650; Blood:1]  Vitals:    04/26/17 2013   Weight: 76.2 kg (167 lb 14.4 oz)[AP1.3]       Gen - Alert, awake, oriented to self in NAD.  Lungs - CTA B.  Heart - RR,S1+S2 nml, no m/g/r.  Abd - soft, NT, ND, + BS.  Ext - no edema.  Neuro - [facility], [place] [facility] [place].         Medications:      All current medications were reviewed.         Data:      All new lab and imaging data was reviewed.   Labs:[AP1.1]  No results for input(s): CULT in the last 168 hours.    Recent Labs  Lab 04/28/17  0740 04/26/17  1725 04/24/17  1042   WBC 5.2 6.8 7.2   HGB 9.1* 9.9* 10.6*   HCT 28.2* 30.4* 32.1*   MCV 87 88 87    193 207       Recent Labs  Lab 04/28/17  0740 04/26/17  1725 04/24/17  1042    136 135   POTASSIUM 4.3 4.5 4.3 "   CHLORIDE 102 100 101   CO2 29 27 26   ANIONGAP 6 9 8   * 139* 141*   BUN 23 34* 25   CR 1.04 1.26* 0.99   GFRESTIMATED 49* 40* 52*   GFRESTBLACK 60* 48* 63   RADHA 8.6 8.4* 8.7[AP1.4]      Recent Imaging:[AP1.1]   No results found for this or any previous visit (from the past 24 hour(s)).[AP1.2]       Revision History        User Key Date/Time User Provider Type Action    > AP1.4 4/29/2017 11:01 AM Teodoro Leonard MD Physician Sign     AP1.3 4/29/2017 10:59 AM Teodoro Leonard MD Physician      AP1.2 4/29/2017 10:57 AM Teodoro Leonard MD Physician      AP1.1 4/29/2017 10:56 AM Teodoro Leonard MD Physician             Progress Notes by Sg Lester MD at 4/29/2017  8:57 AM     Author:  Sg Lester MD Service:  Neurology Author Type:  Physician    Filed:  4/29/2017  9:02 AM Date of Service:  4/29/2017  8:57 AM Note Created:  4/29/2017  8:57 AM    Status:  Signed :  Sg Lester MD (Physician)         [facility]  Ne[facility] [place]  Neurology Daily Note     10/11/11 8:35 AM        Assessment and Plan: 1.   Transient visual change left eye. Resolved. No headache. CRP was quite high  2. Temporal artery biopsy yesterday. Results pending. If negative would not pursue further neuro workup    Attestation:  This patient was seen and evaluated by me,[SS1.1] Sg Lester[SS1.2], separately from the house staff team or resident(s).  I have reviewed the note below and agree.          Interval History:   TA bx done. Results pending           Review of Systems:   Denies visual problem or headache          Medications:[SS1.1]     Current Facility-Administered Medications   Medication     traMADol (ULTRAM) tablet 50 mg     glucose 40 % gel 15-30 g    Or     dextrose 50 % injection 25-50 mL    Or     glucagon injection 1 mg     insulin aspart (NovoLOG) inj (RAPID ACTING)     insulin aspart (NovoLOG) inj (RAPID ACTING)     ondansetron (ZOFRAN) injection 4 mg     prochlorperazine  "(COMPAZINE) injection 5 mg     acetaminophen (TYLENOL) tablet 1,000 mg     aspirin chewable tablet 81 mg     levothyroxine (SYNTHROID/LEVOTHROID) tablet 88 mcg     magnesium hydroxide (MILK OF MAGNESIA) suspension 30 mL     mineral oil enema 1 enema     nitroglycerin (NITROSTAT) sublingual tablet 0.4 mg     sennosides (SENOKOT) tablet 1 tablet     traMADol (ULTRAM) half-tab 25 mg     Medication Instruction     enalaprilat (VASOTEC) injection 1.25 mg     HYDROmorphone (PF) (DILAUDID) injection 0.2 mg     naloxone (NARCAN) injection 0.1-0.4 mg[SS1.3]             Physical Exam:   Vitals: Blood pressure 140/82, temperature 98.1  F (36.7  C), temperature source Oral, resp. rate 16, height 1.727 m (5' 8\"), weight 76.2 kg (167 lb 14.4 oz), SpO2 96 %.  General Appearance:   Alert and pleasant. Knew she is in hospital but not sure why. Face symmetric. Very [place]. Appears to see well from both eyes  .          Data:   ROUTINE IP LABS (Last 3results)  CBC RESULTS:     Recent Labs  Lab 04/28/17  0740 04/26/17  1725 04/24/17  1042   WBC 5.2 6.8 7.2   RBC 3.23* 3.46* 3.70*   HGB 9.1* 9.9* 10.6*   HCT 28.2* 30.4* 32.1*    193 207     Basic Metabolic Panel:    Recent Labs  Lab 04/28/17  0740 04/26/17  1725 04/24/17  1042    136 135   POTASSIUM 4.3 4.5 4.3   CHLORIDE 102 100 101   CO2 29 27 26   BUN 23 34* 25   CR 1.04 1.26* 0.99   * 139* 141*   RADHA 8.6 8.4* 8.7     INR:No lab results found in last 7 days.   Lipid Profile:  Recent Labs   Lab Test  04/27/17   0557   CHOL  187   HDL  51   LDL  108*   TRIG  140     TSH:  TSH   Date Value Ref Range Status   04/26/2017 3.75 0.40 - 4.00 mU/L Final   ,   Vitamin B12: No results found for: B12   A1C:   Lab Results   Component Value Date    A1C 7.3 04/26/2017[SS1.1]                       Revision History        User Key Date/Time User Provider Type Action    > SS1.3 4/29/2017  9:02 AM Sg Lester MD Physician Sign     SS1.2 4/29/2017  9:01 AM Sg Lester MD Physician  "     SS1.1 2017  8:57 AM Sg Lester MD Physician             Progress Notes by Jem Jaramillo MD at 2017  7:20 AM     Author:  Jem Jaramillo MD Service:  Vascular Surgery Author Type:  Physician    Filed:  2017  7:21 AM Date of Service:  2017  7:20 AM Note Created:  2017  7:20 AM    Status:  Signed :  Jem Jaramillo MD (Physician)         Vascular Surgery Progress Note    S:No complaints.  Denies left temporal incisional pain.      Reports normal vision    O:   Vitals:  BP  Min: 107/63  Max: 160/98  Temp  Av.8  F (36.6  C)  Min: 97.1  F (36.2  C)  Max: 98.4  F (36.9  C)  I/O last 3 completed shifts:  In: 1330 [P.O.:630; I.V.:700]  Out: 651 [Urine:650; Blood:1]    Physical Exam: Left incision=A       Path:  No arteritis      Assessment/Plan: Will sign off ( I called her daughter last PM with Path results)      Wm. Ella MD[WO1.1]       Revision History        User Key Date/Time User Provider Type Action    > WO1.1 2017  7:21 AM Jem Jaramillo MD Physician Sign            Progress Notes by Owen Salinas MD at 2017  2:03 PM     Author:  Owen Salinas MD Service:  Hospitalist Author Type:  Physician    Filed:  2017  2:12 PM Date of Service:  2017  2:03 PM Note Created:  2017  2:03 PM    Status:  Signed :  Owen Salinas MD (Physician)         [facility]  Hospitalist Progress Note[PD1.1]  Owen Salinas MD  2017    Assessment & Plan[PD1.2]   A 89-year-old female with history of hypertension, hyperlipidemia, diet-controlled diabetes and dementia. She acutely developed left eye blurry vision earlier tonight, 2 hours prior to coming to the ED. Workup thus far is negative.       Acute left eye blurry vision (versus vision loss):  -  Etiology of patient's vision loss is unclear. CT head without contrast and CT head perfusion are negative. Will obtain MRI brain tonight.  "Echocardiogram with bubble study, hemoglobin A1c and fasting lipid profile will be checked.   - Neurology consulted appreciated  - Temporal Arteritis biopsy today (CRP- 73.5, ESR- 36 ),  - Vas surgery following     Hx of Hypertension with orthostatic Hypotension:  - blood pressure improved  - likely dehydrated, possible Temporal arteritis, r/o adrenal insuffiencey( given falls, hypotension, elevated CRP)  - given IV fluid bolus 500 cc, continue to hold PTA  Imdur   - cosyntropin test is normal   - has  p.r.n. labetalol and hydralazine.       Diabetes mellitus type 2:   - A1c 7.3  - Appears to be diet-controlled.   -  continue ISS low      Hyperlipidemia:   - Not on any lipid-lowering medications.   - ,       Acute renal failure:   - Likely prerenal. Patient's baseline creatinine is 0.9  - Admit creatinine is 1.26  - orthostatic this am, symtomatic, give 500 cc fluid bolus, add knee high TEDS, avoid nephrotoxins.  - creatinine 1.24 to 1.04.      Recent fall resulting in right hip and left-sided chest wall pain:  - increased pain with exertion/deep breathing  - increase scheduled PTA tramadol 25 -> 50 mg 3/day( with hold for lethergy) & scheduled Tylenol.   - also has IV Dilaudid as needed.       Deep venous thrombosis prophylaxis: Sequentials.       CODE STATUS: DNR/DNI[PD1.1]     Interval History[PD1.2]   - chart reviewed  - Planned temp artery biopsy today  - continues to have some decrease in left eye visual acuity     -Data reviewed today: I reviewed all new labs and imaging over the last 24 hours. I personally reviewed no images or EKG's today.[PD1.1]    Physical Exam   Heart Rate: 81[PD1.2],[PD1.1] Blood pressure 134/77, temperature 97.9  F (36.6  C), temperature source Oral, resp. rate 18, height 1.727 m (5' 8\"), weight 76.2 kg (167 lb 14.4 oz), SpO2 97 %.  Vitals:    04/26/17 2013   Weight: 76.2 kg (167 lb 14.4 oz)[PD1.2]     Vital Signs with Ranges[PD1.1]  Temp:  [97.8  F (36.6  C)-98.4  F (36.9 "  C)] 97.9  F (36.6  C)  Heart Rate:  [] 81  Resp:  [16-18] 18  BP: (112-164)/(55-97) 134/77  SpO2:  [91 %-98 %] 97 %[PD1.2]  I/O's Last 24 hours[PD1.1]  I/O last 3 completed shifts:  In: 800 [P.O.:300; I.V.:500]  Out: 1175 [Urine:1175][PD1.2]    Constitutional:   no apparent distress  Respiratory: Clear to auscultation bilaterally, no crackles or wheezing  Cardiovascular: Regular rate and rhythm, normal S1 and S2, and no murmur noted  GI: Normal bowel sounds, soft, non-distended, non-tender  Skin/Integumen: No rashes, no cyanosis, no edema  Other:[PD1.1]      Medications[PD1.2]   All medications were reviewed.[PD1.1]    - MEDICATION INSTRUCTIONS -         traMADol (ULTRAM) tablet 50 mg  50 mg Oral TID     insulin aspart  1-3 Units Subcutaneous TID AC     insulin aspart  1-3 Units Subcutaneous At Bedtime     acetaminophen (TYLENOL) tablet 1,000 mg  1,000 mg Oral TID     aspirin chewable tablet 81 mg  81 mg Oral Daily     levothyroxine (SYNTHROID/LEVOTHROID) tablet 88 mcg  88 mcg Oral Daily        Data     Recent Labs  Lab 04/28/17  0740 04/27/17  0557 04/27/17  0040 04/26/17  1725 04/24/17  1042   WBC 5.2  --   --  6.8 7.2   HGB 9.1*  --   --  9.9* 10.6*   MCV 87  --   --  88 87     --   --  193 207     --   --  136 135   POTASSIUM 4.3  --   --  4.5 4.3   CHLORIDE 102  --   --  100 101   CO2 29  --   --  27 26   BUN 23  --   --  34* 25   CR 1.04  --   --  1.26* 0.99   ANIONGAP 6  --   --  9 8   RADHA 8.6  --   --  8.4* 8.7   *  --   --  139* 141*   TROPI  --  0.020 0.022 <0.015The 99th percentile for upper reference range is 0.045 ug/L.  Troponin values in the range of 0.045 - 0.120 ug/L may be associated with risks of adverse clinical events. 0.019       No results found for this or any previous visit (from the past 24 hour(s)).[PD1.2]    Owen Salinas MD  Pager [phone][PD1.1]          Revision History        User Key Date/Time User Provider Type Action    > PD1.2 4/28/2017  2:12 PM  Owen Salinas MD Physician Sign     PD1.1 2017  2:03 PM Owen Salinas MD Physician             Progress Notes by Jem Jaramillo MD at 2017 10:43 AM     Author:  Jem Jaramillo MD Service:  Vascular Surgery Author Type:  Physician    Filed:  2017 10:46 AM Date of Service:  2017 10:43 AM Note Created:  2017 10:43 AM    Status:  Signed :  Jem Jaramillo MD (Physician)         Vascular Surgery Progress Note    S:Reported temporary left vision loss ( now fine).  No headache or temporal artery tenderness.     O: Normal MRI  Vitals:  BP  Min: 94/63  Max: 164/97  Temp  Av.1  F (36.7  C)  Min: 97.8  F (36.6  C)  Max: 98.4  F (36.9  C)  I/O last 3 completed shifts:  In: 800 [P.O.:300; I.V.:500]  Out: 1175 [Urine:1175]    Physical Exam: Non-tender ST arteries.  Very [place].  + dementia               ESR=36  and 34      Assessment/Plan: ??? GCA.  We will perform left TA biopsy later today if requested by Dr Bull.  Discussed with Nursing Staff.  Very difficult for patient to comprehend.      Wm. Ella MD[WO1.1]       Revision History        User Key Date/Time User Provider Type Action    > WO1.1 2017 10:46 AM Jem Jaramillo MD Physician Sign            Progress Notes by Rosalinda Holland APRN CNP at 2017  9:30 AM     Author:  Rosalinda Holland APRN CNP Service:  Neurology Author Type:  Nurse Practitioner    Filed:  2017  9:49 AM Date of Service:  2017  9:30 AM Note Created:  2017  9:30 AM    Status:  Attested :  Rosalinda Holland APRN CNP (Nurse Practitioner)    Cosigner:  Ronald Bull MD at 2017  9:55 AM        Attestation signed by Ronald Bull MD at 2017  9:55 AM        Physician Attestation   Ronald MILTON, saw and evaluated Olga Zelaya as part of a shared visit.  I have reviewed and discussed with the advanced practice provider their history, physical and  "plan.    I personally reviewed the vital signs, medications, labs and imaging.    My key history or physical exam findings: States that her vision is fine. Patient is too demented to get a reliable hx    Key management decisions made by me: temporal artery biopsy, likely will be negative. Likely the issue is related to orthostasis.     Ronald MARCHIliana Bull  Date of Service (when I saw the patient): 04/28/17                               [facility]  [facility] [place]  Neurology Daily Note      Admission Date:4/26/2017   Date of service: 04/28/2017   Hospital Day: 3      Assessment & Plan   _______________________________  #. (H54.62) Vision loss of left eye (primary encounter diagnosis)  --Highly elevated CRP, will check ESR  --Possible GCA.   --Discussed with family possible temporal artery biopsy, given severe dementia  -----Family wants to proceed with temporal artery biopsy, scheduled today[MC1.1]  -----ESR pending[MC1.2]  --MRI brain was unremarkable  #. (I67.1) Cerebral aneurysm, nonruptured  --Small aneurysm, should not be addressed at patient's age  #. (G30.1, F02.80) Late onset Alzheimer's disease without behavioral disturbance  --Baseline dementia  #. PT/OT/Speech  --continue evaluations  #. Nutrition  --Per speech therapy evaluation   #. DVT Prophylaxis  --per primary service    Code Status: DNR/DNI    Disposition: pending workup completion    Interval History   _______________________________  Patient presented with left eye vision loss. Had a fall 2 days prior with negative evaluation. Patient is a poor historian due to dementia. No other focal neurological deficits noted. MRI brain negative for acute abnormalities. Will get temporal artery biopsy due to possible giant cell arteritis.   Difficult to fully assess patient due to dementia. She reports dry mouth this morning. She stated \"I don't even know where I am or why I'm here\", when explained she was here because of " "vision troubles, she responded \"I can't hear, but I can see fine\".    Review of Systems   _______________________________  Review of systems is limited by patient factors - dementia  Physical Exam   _______________________________  Vitals: Temp: 98.4  F (36.9  C) Temp src: Oral BP: 140/77   Heart Rate: 97 Resp: 18 SpO2: 91 % O2 Device: None (Room air)    Vital Signs with Ranges: Temp:  [97.8  F (36.6  C)-98.4  F (36.9  C)] 98.4  F (36.9  C)  Heart Rate:  [] 97  Resp:  [16-18] 18  BP: ()/(55-97) 140/77  SpO2:  [91 %-98 %] 91 %    General Appearance:  No acute distress  Neuro:       Mental Status Exam:   Awake, alert, disoriented. Speech and language are intact. Mental status is demented       Cranial Nerves:  Pupils 3 mm, reactive. EOMI. Face sensation is normal. Face is symmetric. Profoundly [place]. Tongue and uvula are midline. Other CN are normal           Motor:  5/5 X 4. Tone and bulk are normal           Reflexes:  Normal DTR. Toes equivocal.        Sensory:  Grossly intact                 Coordination:   Grossly intact       Gait:  Up with assistance  Cardiovascular: Regular rate and rhythm, no m/r/g  Lungs: Clear to auscultation  Abdomen: Soft, not tender, not distended  Extremities: No clubbing, no cyanosis, no edema    Medications   _______________________________    - MEDICATION INSTRUCTIONS -         traMADol (ULTRAM) tablet 50 mg  50 mg Oral TID     insulin aspart  1-3 Units Subcutaneous TID AC     insulin aspart  1-3 Units Subcutaneous At Bedtime     acetaminophen (TYLENOL) tablet 1,000 mg  1,000 mg Oral TID     aspirin chewable tablet 81 mg  81 mg Oral Daily     levothyroxine (SYNTHROID/LEVOTHROID) tablet 88 mcg  88 mcg Oral Daily       Data   _______________________________      Lab Data:   All data was reviewed by me personally  CBC RESULTS:  Recent Labs   Lab Test  04/28/17   0740  04/26/17   1725  04/24/17   1042   WBC  5.2  6.8  7.2   RBC  3.23*  3.46*  3.70*   HGB  9.1*  9.9*  10.6* "   HCT  28.2*  30.4*  32.1*   PLT  175  193  207     Basic Metabolic Panel:  Recent Labs   Lab Test  04/28/17   0740  04/26/17   1725  04/24/17   1042   NA  137  136  135   POTASSIUM  4.3  4.5  4.3   CHLORIDE  102  100  101   CO2  29  27  26   BUN  23  34*  25   CR  1.04  1.26*  0.99   GLC  111*  139*  141*   RADHA  8.6  8.4*  8.7     Liver panel:  Recent Labs   Lab Test  02/19/12   2250   PROTTOTAL  6.5*   ALBUMIN  3.6   BILITOTAL  1.1   ALKPHOS  78   AST  35   ALT  13     Coagulation  Recent Labs   Lab Test  08/09/13   2115  02/19/12   2250   INR  1.00  1.05   PTT  30  31      Lipid Profile:  Recent Labs   Lab Test  04/27/17   0557   CHOL  187   HDL  51   LDL  108*   TRIG  140     Thyroid Panel:  Recent Labs   Lab Test  04/26/17   1725   TSH  3.75      Vitamin D level:   Recent Labs   Lab Test  04/26/17   1725   VITDT  50     A1C:   Recent Labs   Lab Test  04/26/17   1725  02/19/12   2250   A1C  7.3*  5.9     Troponin I:   Recent Labs   Lab Test  04/27/17   0557  04/27/17   0040  04/26/17   1725  04/24/17   1042   TROPI  0.020  0.022  <0.015  The 99th percentile for upper reference range is 0.045 ug/L.  Troponin values in   the range of 0.045 - 0.120 ug/L may be associated with risks of adverse   clinical events.    0.019      CRP inflammation:   Recent Labs   Lab Test  04/26/17   1725   CRP  73.5*     ESR:   Recent Labs   Lab Test  04/27/17   0557   SED  36*       UA Results:  Recent Labs   Lab Test  04/24/17   1200   COLOR  Yellow   APPEARANCE  Clear   URINEGLC  Negative   URINEBILI  Negative   URINEKETONE  Negative   SG  1.012   UBLD  Negative   URINEPH  6.0   PROTEIN  Negative   NITRITE  Negative   LEUKEST  Trace*   RBCU  1   WBCU  4*          Imaging:   All imaging studies were reviewed personally  CT head 4/26/17:   1. Atrophy and chronic white matter disease.  2. Nothing acute.  3. No interval change.  4. CT angiogram to follow.    CTA head/neck 4/26/17:   1. Nothing acute.  2. Tiny aneurysm of the anterior  communicating artery.    CTP head 4/26/17:  --There are no focal or regional perfusion defects in the brain.    MRI brain 4/26/17:   1. Atrophy and chronic white matter disease.  2. Nothing clearly acute.  3. Motion artifact.      Rosalinda Holland, FNP-BC[MC1.1]       Revision History        User Key Date/Time User Provider Type Action    > [N/A] 4/28/2017  9:49 AM Rosalinda Holland APRN CNP Nurse Practitioner Sign     MC1.2 4/28/2017  9:48 AM Rosalinda Holland APRN CNP Nurse Practitioner Sign     MC1.1 4/28/2017  9:30 AM Rosalinda Holland APRN CNP Nurse Practitioner             Progress Notes by Jacqueline Weber RN at 4/27/2017 10:18 PM     Author:  Jacqueline Weber RN Service:  (none) Author Type:  Registered Nurse    Filed:  4/27/2017 10:18 PM Date of Service:  4/27/2017 10:18 PM Note Created:  4/27/2017 10:18 PM    Status:  Signed :  Jacqueline Weber RN (Registered Nurse)         Tele reads SR.[CS1.1]      Revision History        User Key Date/Time User Provider Type Action    > CS1.1 4/27/2017 10:18 PM Jacqueline Weber RN Registered Nurse Sign            Progress Notes by Barbara Dickerson, PT at 4/27/2017 11:55 AM     Author:  Barbara Dickerson PT Service:  (none) Author Type:  Physical Therapist    Filed:  4/27/2017  5:38 PM Date of Service:  4/27/2017 11:55 AM Note Created:  4/27/2017 11:55 AM    Status:  Addendum :  Barbara Dickerson, PT (Physical Therapist)          04/27/17 1100   Quick Adds   Type of Visit Initial PT Evaluation   Living Environment   Lives With alone   Living Arrangements assisted living   Home Accessibility no concerns   Self-Care   Usual Activity Tolerance moderate   Current Activity Tolerance fair   Regular Exercise yes   Activity/Exercise Type walking;strength training  (HHPT/OT past 3 weeks, Unsure of weekly frequency)   Functional Level Prior   Ambulation 0-->independent   Transferring 0-->independent   Toileting  0-->independent   Bathing 0-->independent   Dressing 0-->independent   Eating 0-->independent   Communication 0-->understands/communicates without difficulty   Swallowing 0-->swallows foods/liquids without difficulty   Cognition 2 - difficulty with organizing thoughts   Fall history within last six months yes   Number of times patient has fallen within last six months 1   Which of the above functional risks had a recent onset or change? fall history   Prior Functional Level Comment Pt lives alone at an assisted living facility, no concerns. Per discussion with pt's daughter via phone: pt received assist with medications, , daily walk, and 2 meals/day in dining room. Pt's daughter reports pt has been independent with bathing, toileting, and dressing, however reports concern about pt's hygiene and has been considering having assist with bathing. Pt's daughter also expresses concern that pt refuses to use walker. Pt's daughter reports pt has been receiving HHPT/OT for past 3 weeks d/t insidious onset knee pain/swelling and difficulty walking.    General Information   Onset of Illness/Injury or Date of Surgery - Date 04/26/17   Referring Physician Patricio Ho MD   Patient/Family Goals Statement Not stated   Pertinent History of Current Problem (include personal factors and/or comorbidities that impact the POC) Pt is a 89yo female admitted under observation for vision loss, pt's daughter also reports pt has been dizzy and lightheaded in past couple weeks and had a mechanical fall 2 days ago.   Precautions/Limitations fall precautions   General Observations Pt resting in bed, appears comfortable. Pt very [place], no hearing aids.   Cognitive Status Examination   Orientation orientation to person, place and time   Level of Consciousness alert   Follows Commands and Answers Questions 75% of the time;able to follow single-step instructions   Personal Safety and Judgment at risk behaviors demonstrated    Memory impaired   Cognitive Comment Baseline dementia   Pain Assessment   Patient Currently in Pain No   Integumentary/Edema   Integumentary/Edema Comments BLEs pitting edema   Posture    Posture Forward head position;Protracted shoulders;Kyphosis   Range of Motion (ROM)   ROM Comment B LEs WNL for functional mobility   Strength   Strength Comments B LEs WFL for functional moblity with AD, unable to formally assess d/t cognition, appears generally deconditioned   Bed Mobility   Bed Mobility Comments SBA supine>sit   Transfer Skills   Transfer Comments CGA sit>stand to FWW   Gait   Gait Comments 10' with FWW and CGA/[place] for walker management at times, pt unsteady, tends to push walker off to side   Balance   Balance Comments Pt unsteady with initial stance, requries [place]. Pt bracing self in stance with back of LEs on bed   Sensory Examination   Sensory Perception Comments Denies N/T, very [place]   Coordination   Coordination Comments Gross motor coordination appears WNL   Muscle Tone   Muscle Tone no deficits were identified   General Therapy Interventions   Planned Therapy Interventions bed mobility training;gait training;neuromuscular re-education;ROM;strengthening;stretching;transfer training;progressive activity/exercise   Clinical Impression   Criteria for Skilled Therapeutic Intervention yes, treatment indicated   PT Diagnosis Impaired gait   Influenced by the following impairments Dizziness, positive orthostatics, weakness, decreased balance, cognition, decreased activity tolerance   Functional limitations due to impairments Decreased safety and independence with functional transfers and gait   Clinical Presentation Evolving/Changing   Clinical Presentation Rationale clinical judgment, positive orthostats, dizziness   Clinical Decision Making (Complexity) Low complexity   Therapy Frequency`[KJ1.1] 3x/week[KJ1.2]   Predicted Duration of Therapy Intervention (days/wks)[KJ1.1] 4[KJ1.2] day[KJ1.1]s[KJ1.2]  "  Anticipated Discharge Disposition Transitional Care Facility;Home with Home Therapy;Home with Assist   Risk & Benefits of therapy have been explained Yes   Patient, Family & other staff in agreement with plan of care Yes   Clinical Impression Comments OT screen complete, no skilled IP OT needs identified.   [facility] AM-PAC TM \"6 Clicks\"   2016, Trustees of [facility], under license to [employer].  All rights reserved.   6 Clicks Short Forms Basic Mobility Inpatient Short Form   [facility] AM-PAC  \"6 Clicks\" V.2 Basic Mobility Inpatient Short Form   1. Turning from your back to your side while in a flat bed without using bedrails? 4 - None   2. Moving from lying on your back to sitting on the side of a flat bed without using bedrails? 3 - A Little   3. Moving to and from a bed to a chair (including a wheelchair)? 3 - A Little   4. Standing up from a chair using your arms (e.g., wheelchair, or bedside chair)? 3 - A Little   5. To walk in hospital room? 3 - A Little   6. Climbing 3-5 steps with a railing? 2 - A Lot   Basic Mobility Raw Score (Score out of 24.Lower scores equate to lower levels of function) 18   Total Evaluation Time   Total Evaluation Time (Minutes) 11[KJ1.1]        Revision History        User Key Date/Time User Provider Type Action    > KJ1.2 4/27/2017  5:38 PM Barbara Dickerson, PT Physical Therapist Addend     KJ1.1 4/27/2017 11:55 AM Barbara Dickerson, PT Physical Therapist Sign            Progress Notes by Cari Santiago RN at 4/27/2017  3:38 PM     Author:  Cair Santiago RN Service:  (none) Author Type:      Filed:  4/27/2017  3:40 PM Date of Service:  4/27/2017  3:38 PM Note Created:  4/27/2017  3:38 PM    Status:  Signed :  Cari Santiago RN ()         Met w/ fabianor Linh to discuss obs care admit status and subsequent change to IP today.  OP/Obs brochure discussed and given. Explained that IP status starts today and isn't retroactive.  " Pt may need more assist at dc than she currently gets at her nursing home.  CM will continue to follow.[GN1.1]       Revision History        User Key Date/Time User Provider Type Action    > GN1.1 4/27/2017  3:40 PM Cari Santiago, RN Case Manager Sign            Progress Notes by Kayli Vega MD at 4/27/2017 12:10 PM     Author:  Kayli Vega MD Service:  Hospitalist Author Type:  Physician    Filed:  4/27/2017  3:15 PM Date of Service:  4/27/2017 12:10 PM Note Created:  4/27/2017 12:10 PM    Status:  Signed :  Kayli Vega MD (Physician)         [facility]  Hospitalist Progress Note          Assessment and Plan:   A 89-year-old female with history of hypertension, hyperlipidemia, diet-controlled diabetes and dementia. She acutely developed left eye blurry vision earlier tonight, 2 hours prior to coming to the ED. Workup thus far is negative.       Acute left eye blurry vision (versus vision loss):  -  Etiology of patient's vision loss is unclear. CT head without contrast and CT head perfusion are negative. Will obtain MRI brain tonight. Echocardiogram with bubble study, hemoglobin A1c and fasting lipid profile will be checked.   - Neurology consulted appreciated  - Poss Temporal Arteritis being considered (CRP- 73.5, ESR- 36 ), Vas surgery consulted for poss bx.     Hx of Hypertension with orthostatic Hypotension:  - likely dehydrated, possible Temporal arteritis, r/o adrenal insuffiencey( given falls, hypotension, elevated CRP)  - give IV fluid bolus 500 cc, continue to hold PTA  Imdur & check cosyntropin test in am, further plans for Temporal artery bx per neurology.   - has  p.r.n. labetalol and hydralazine.       Diabetes mellitus type 2:   - A1c 7.3  - Appears to be diet-controlled.   - start ISS low      Hyperlipidemia: Not on any lipid-lowering medications. Check fasting lipid profile tomorrow morning and consider starting statin.       Acute renal  "failure:   - Likely prerenal. Patient's baseline creatinine is 0.9  - Admit creatinine is 1.26  - orthostatic this am, symtomatic, give 500 cc fluid bolus, add knee high TEDS, avoid nephrotoxins.  - recheck BMP in am      Recent fall resulting in right hip and left-sided chest wall pain:  - increased pain with exertion/deep breathing  - increase scheduled PTA tramadol 25 -> 50 mg 3/day( with hold for lethergy) & scheduled Tylenol.   - also has IV Dilaudid as needed.       Deep venous thrombosis prophylaxis: Sequentials.       CODE STATUS: DNR/DNI[PK1.1]     Kayli Vega MD[PK1.2]  Hospitalist                Interval History:   C/o pleuritic type L sided chest pain with movement.Pt evaluated with RN present. Denies pain at present except when pr applied.[place]              Medications:       sodium chloride 0.9%  250 mL Intravenous Once     cosyntropin  250 mcg Intravenous Once     traMADol (ULTRAM) tablet 50 mg  50 mg Oral TID     acetaminophen (TYLENOL) tablet 1,000 mg  1,000 mg Oral TID     aspirin chewable tablet 81 mg  81 mg Oral Daily     isosorbide mononitrate (ISMO/MONOKET) half-tab 5 mg  5 mg Oral BID AC     levothyroxine (SYNTHROID/LEVOTHROID) tablet 88 mcg  88 mcg Oral Daily     magnesium hydroxide, mineral oil, nitroglycerin (NITROSTAT) sublingual tablet 0.4 mg, sennosides, traMADol (ULTRAM) half-tab 25 mg, - MEDICATION INSTRUCTIONS -, enalaprilat, HYDROmorphone, naloxone               Physical Exam:   Blood pressure 94/63, temperature 98.1  F (36.7  C), temperature source Oral, resp. rate 18, height 1.727 m (5' 8\"), weight 76.2 kg (167 lb 14.4 oz), SpO2 98 %.  Wt Readings from Last 4 Encounters:   17 76.2 kg (167 lb 14.4 oz)   17 69.9 kg (154 lb)   13 54.4 kg (120 lb)   13 72.6 kg (160 lb)         Vital Sign Ranges  Temperature Temp  Av.1  F (36.7  C)  Min: 98  F (36.7  C)  Max: 98.3  F (36.8  C)   Blood pressure Systolic (24hrs), Av , Min:94 , Max:177        " Diastolic (24hrs), Av, Min:63, Max:97      Pulse No Data Recorded   Respirations Resp  Av.2  Min: 18  Max: 40   Pulse oximetry SpO2  Av.3 %  Min: 93 %  Max: 98 %         Intake/Output Summary (Last 24 hours) at 17 1210  Last data filed at 17 0930   Gross per 24 hour   Intake              240 ml   Output              575 ml   Net             -335 ml       Constitutional: [place], awake, alert, cooperative, no apparent distress   Lungs: Clear to auscultation bilaterally, no crackles or wheezing   Cardiovascular: Regular rate and rhythm, normal S1 and S2, and no murmur noted   Abdomen: Normal bowel sounds, soft, non-distended, non-tender   Skin: No rashes, no cyanosis, no edema   chest Tenderness over L ant lower 1/2 rib cage/ chest.no crepitus               Data:   All laboratory data reviewed[PK1.1]       Revision History        User Key Date/Time User Provider Type Action    > PK1.2 2017  3:15 PM Kayli Vega MD Physician Sign     PK1.1 2017 12:10 PM Kayli Vega MD Physician             Progress Notes by Cari Santiago RN at 2017 12:26 PM     Author:  Cari Santiago RN Service:  (none) Author Type:      Filed:  2017 12:26 PM Date of Service:  2017 12:26 PM Note Created:  2017 12:26 PM    Status:  Signed :  Cari Santiago RN ()         Discussed with Dr Vega.  She feels pt should be changed to IP. Sent to UR for IP review. CM will continue to assist w/ dc planning.[GN1.1]       Revision History        User Key Date/Time User Provider Type Action    > GN1.1 2017 12:26 PM Cari Santiago RN Case Manager Sign            Progress Notes by Ivon Mcneal at 2017 10:48 AM     Author:  Ivon Mcneal Service:  Spiritual Health Author Type:      Filed:  2017 11:01 AM Date of Service:  2017 10:48 AM Note Created:  2017 10:48 AM    Status:  Signed :  Fredis  "Ivon ()         [facility]  Spiritual Assessment Progress Note  [facility] 73      PRIMARY FOCUS:      Support for coping    ILLNESS CIRCUMSTANCES:    Reviewed documentation. Reflective conversation shared with patient  which integrated elements of illness and family narratives.   Patient was alert and sitting up in bed.  \" I don't hear well.\"  It was a one sided conversation as the patient could not hear well.      Context of Serious Illness/Symptom(s) - Patient reports that she does not know why she is in the hospital.  Forgets at times that this is not the assisted living facility that she has been living in.     Resources for Support -  Has one daughter Linh.  .   DISTRESS:      Emotional, Existential/Relational Distress - Patient reports that her   about 10 years ago and she lost a son in a car accident.  She lived in [place] but moved here to be closer to her daughter Linh.She was living in a AL facility. She is not sure how long she will be here but hopes that she will be able to get up and walk around. Patient is hard of hearing and states that she does not use hearing aids(?).    Spiritual/Advent Distress - None discussed     Social/Cultural/Economic Distress- None discussed.       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 Prayed rote prays which she was able to lead. Her adrianne is important to her.     Emotional, Relational, Existential Connections - Patient is very social and talkative today appears to enjoy the interaction.        GOALS OF CARE:    Goals of Care - Patient hopes to return to her AL facility.     Meaning/Sense-Making - Voices no distress but is unsure why she is here.       PLAN: will follow for spiritual support.      Ivon Mcneal  Chaplain Resident  Pager [phone]                        [MS1.1]          Revision History        " User Key Date/Time User Provider Type Action    > MS1.1 4/27/2017 11:01 AM Ivon Mcneal Sign            Progress Notes by Rubina Soriano SLP at 4/27/2017 10:04 AM     Author:  Rubina Soriano SLP Service:  Physical Medicine and Rehabilitation Author Type:  Speech Language    Filed:  4/27/2017 10:04 AM Date of Service:  4/27/2017 10:04 AM Note Created:  4/27/2017 10:04 AM    Status:  Signed :  Rubina Soriano SLP (Speech Language)            04/27/17 0900   General Information   Onset Date 04/26/17   Start of Care Date 04/27/17   Referring Physician Vic   Patient/Family Goals Statement Pt did not state   Swallowing Evaluation Bedside swallow evaluation   Behaviorial Observations Confused   Mode of current nutrition Oral diet   Type of oral diet Regular;Thin liquid   Respiratory Status Room air   Comments Baseline dementia and hard of hearing admitted 2 days post fall.   Clinical Swallow Evaluation   Oral Musculature generally intact   Structural Abnormalities none present   Dentition present and adequate   Secretion Management (WNL)   Mucosal Quality good   Mandibular Strength and Mobility intact   Oral Labial Strength and Mobility impaired coordination   Lingual Strength and Mobility impaired coordination   Velar Elevation intact   Buccal Strength and Mobility (Did not assess)   Laryngeal Function Swallow;Voicing initiated   Clinical Swallow Eval: Thin Liquid Texture Trial   Mode of Presentation, Thin Liquids cup;self-fed   Volume of Liquid or Food Presented sips of coffee, refused water   Oral Phase of Swallow WFL   Oral Residue (None noted)   Pharyngeal Phase of Swallow intact   Diagnostic Statement No overt s/s of aspiration   Clinical Swallow Eval: Puree Solid Texture Trial   Mode of Presentation, Puree spoon;self-fed   Volume of Puree Presented 5 bites of oatmeal   Oral Phase, Puree WFL   Oral Residue, Puree (None noted)   Pharyngeal Phase, Puree intact   Diagnostic Statement No overt  "s/s of aspiration   Clinical Swallow Eval: Semisolid Texture Trial   Mode of Presentation, Semisolid spoon;fed by clinician   Volume of Semisolid Food Presented single bite- refused further trials   Oral Phase, Semisolid WFL   Oral Residue, Semisolid (none noted)   Pharyngeal Phase, Semisolid intact   Diagnostic Statement No overt s/s of aspiraiton   Swallow Compensations   Swallow Compensations Alternate viscosity of consistencies;Pacing;Reduce amounts   Results No difficulties noted   General Therapy Interventions   Planned Therapy Interventions Dysphagia Treatment   Dysphagia treatment Instruction of safe swallow strategies   Intervention Comments Single follow up to ensure safety   Swallow Eval: Clinical Impressions   Skilled Criteria for Therapy Intervention Skilled criteria met.  Treatment indicated.   Dysphagia Outcome Severity Scale (UDAY) Level 6 - UDAY   Diet texture recommendations Regular diet;Thin liquids   Recommended Feeding/Eating Techniques alternate between small bites and sips of food/liquid;maintain upright posture during/after eating for 30 mins;small sips/bites   Therapy Frequency daily   Predicted Duration of Therapy Intervention (days/wks) 1-2 days   Anticipated Discharge Disposition other (see comments)   Risks and Benefits of Treatment have been explained. (back to [facility])   Patient, family and/or staff in agreement with Plan of Care Yes   Clinical Impression Comments SLP: Pt seen for bedside swallow evaluation. Pt presents with functional swallow however d/t dementia pt is an aspiration risk. No overt s/s of aspiraiton with oatmeal or coffee however pt's ROM is noted to be uncoordianted. Pt refused trials of water and only tolerated single bite of semi solids. Pt is very hard of  hearing and is moderately confused - refused trials of regular solids during assessment. Pt stating \" I am not hungry- I have no appetite.\" Recommend: Cont regular diet and thin liquids. Safe swallow strategies. SLP " will follow up to ensure safety with regular diet. D/C back to [facility].    Total Evaluation Time   Total Evaluation Time (Minutes) 15[LB1.1]        Revision History        User Key Date/Time User Provider Type Action    > LB1.1 4/27/2017 10:04 AM Rubina Soriano, SLP Speech Language Sign                  Procedure Notes     No notes of this type exist for this encounter.         Progress Notes - Therapies (Notes from 04/27/17 through 04/30/17)      Progress Notes by Barbara Dickerson PT at 4/27/2017 11:55 AM     Author:  Barbara Dickerson PT Service:  (none) Author Type:  Physical Therapist    Filed:  4/27/2017  5:38 PM Date of Service:  4/27/2017 11:55 AM Note Created:  4/27/2017 11:55 AM    Status:  Addendum :  Barbara Dickerson, PT (Physical Therapist)          04/27/17 1100   Quick Adds   Type of Visit Initial PT Evaluation   Living Environment   Lives With alone   Living Arrangements assisted living   Home Accessibility no concerns   Self-Care   Usual Activity Tolerance moderate   Current Activity Tolerance fair   Regular Exercise yes   Activity/Exercise Type walking;strength training  (HHPT/OT past 3 weeks, Unsure of weekly frequency)   Functional Level Prior   Ambulation 0-->independent   Transferring 0-->independent   Toileting 0-->independent   Bathing 0-->independent   Dressing 0-->independent   Eating 0-->independent   Communication 0-->understands/communicates without difficulty   Swallowing 0-->swallows foods/liquids without difficulty   Cognition 2 - difficulty with organizing thoughts   Fall history within last six months yes   Number of times patient has fallen within last six months 1   Which of the above functional risks had a recent onset or change? fall history   Prior Functional Level Comment Pt lives alone at an assisted living facility, no concerns. Per discussion with pt's daughter via phone: pt received assist with medications, , daily walk, and 2 meals/day in dining  room. Pt's daughter reports pt has been independent with bathing, toileting, and dressing, however reports concern about pt's hygiene and has been considering having assist with bathing. Pt's daughter also expresses concern that pt refuses to use walker. Pt's daughter reports pt has been receiving HHPT/OT for past 3 weeks d/t insidious onset knee pain/swelling and difficulty walking.    General Information   Onset of Illness/Injury or Date of Surgery - Date 04/26/17   Referring Physician Patricio Ho MD   Patient/Family Goals Statement Not stated   Pertinent History of Current Problem (include personal factors and/or comorbidities that impact the POC) Pt is a 89yo female admitted under observation for vision loss, pt's daughter also reports pt has been dizzy and lightheaded in past couple weeks and had a mechanical fall 2 days ago.   Precautions/Limitations fall precautions   General Observations Pt resting in bed, appears comfortable. Pt very [place], no hearing aids.   Cognitive Status Examination   Orientation orientation to person, place and time   Level of Consciousness alert   Follows Commands and Answers Questions 75% of the time;able to follow single-step instructions   Personal Safety and Judgment at risk behaviors demonstrated   Memory impaired   Cognitive Comment Baseline dementia   Pain Assessment   Patient Currently in Pain No   Integumentary/Edema   Integumentary/Edema Comments BLEs pitting edema   Posture    Posture Forward head position;Protracted shoulders;Kyphosis   Range of Motion (ROM)   ROM Comment B LEs WNL for functional mobility   Strength   Strength Comments B LEs WFL for functional moblity with AD, unable to formally assess d/t cognition, appears generally deconditioned   Bed Mobility   Bed Mobility Comments SBA supine>sit   Transfer Skills   Transfer Comments CGA sit>stand to FWW   Gait   Gait Comments 10' with FWW and CGA/Avery for walker management at times, pt unsteady, tends to push  "walker off to side   Balance   Balance Comments Pt unsteady with initial stance, requries Avery. Pt bracing self in stance with back of LEs on bed   Sensory Examination   Sensory Perception Comments Denies N/T, very [place]   Coordination   Coordination Comments Gross motor coordination appears WNL   Muscle Tone   Muscle Tone no deficits were identified   General Therapy Interventions   Planned Therapy Interventions bed mobility training;gait training;neuromuscular re-education;ROM;strengthening;stretching;transfer training;progressive activity/exercise   Clinical Impression   Criteria for Skilled Therapeutic Intervention yes, treatment indicated   PT Diagnosis Impaired gait   Influenced by the following impairments Dizziness, positive orthostatics, weakness, decreased balance, cognition, decreased activity tolerance   Functional limitations due to impairments Decreased safety and independence with functional transfers and gait   Clinical Presentation Evolving/Changing   Clinical Presentation Rationale clinical judgment, positive orthostats, dizziness   Clinical Decision Making (Complexity) Low complexity   Therapy Frequency`[KJ1.1] 3x/week[KJ1.2]   Predicted Duration of Therapy Intervention (days/wks)[KJ1.1] 4[KJ1.2] day[KJ1.1]s[KJ1.2]   Anticipated Discharge Disposition Transitional Care Facility;Home with Home Therapy;Home with Assist   Risk & Benefits of therapy have been explained Yes   Patient, Family & other staff in agreement with plan of care Yes   Clinical Impression Comments OT screen complete, no skilled IP OT needs identified.   [facility] [employer] TM \"6 Clicks\"   2016, Trustees of [facility], under license to [employer].  All rights reserved.   6 Clicks Short Forms Basic Mobility Inpatient Short Form   [facility] AM-PAC  \"6 Clicks\" V.2 Basic Mobility Inpatient Short Form   1. Turning from your back to your side while in a flat bed without using bedrails? 4 - None   2. Moving from lying " on your back to sitting on the side of a flat bed without using bedrails? 3 - A Little   3. Moving to and from a bed to a chair (including a wheelchair)? 3 - A Little   4. Standing up from a chair using your arms (e.g., wheelchair, or bedside chair)? 3 - A Little   5. To walk in hospital room? 3 - A Little   6. Climbing 3-5 steps with a railing? 2 - A Lot   Basic Mobility Raw Score (Score out of 24.Lower scores equate to lower levels of function) 18   Total Evaluation Time   Total Evaluation Time (Minutes) 11[KJ1.1]        Revision History        User Key Date/Time User Provider Type Action    > KJ1.2 4/27/2017  5:38 PM Barbara Dickerson, PT Physical Therapist Addend     KJ1.1 4/27/2017 11:55 AM Barbara Dickerson, PT Physical Therapist Sign            Progress Notes by Rubina Soriano SLP at 4/27/2017 10:04 AM     Author:  Rubina Soriano SLP Service:  Physical Medicine and Rehabilitation Author Type:  Speech Language    Filed:  4/27/2017 10:04 AM Date of Service:  4/27/2017 10:04 AM Note Created:  4/27/2017 10:04 AM    Status:  Signed :  Rubina Soriano, SLP (Speech Language)            04/27/17 0900   General Information   Onset Date 04/26/17   Start of Care Date 04/27/17   Referring Physician Vic   Patient/Family Goals Statement Pt did not state   Swallowing Evaluation Bedside swallow evaluation   Behaviorial Observations Confused   Mode of current nutrition Oral diet   Type of oral diet Regular;Thin liquid   Respiratory Status Room air   Comments Baseline dementia and hard of hearing admitted 2 days post fall.   Clinical Swallow Evaluation   Oral Musculature generally intact   Structural Abnormalities none present   Dentition present and adequate   Secretion Management (WNL)   Mucosal Quality good   Mandibular Strength and Mobility intact   Oral Labial Strength and Mobility impaired coordination   Lingual Strength and Mobility impaired coordination   Velar Elevation intact   Buccal Strength and Mobility  (Did not assess)   Laryngeal Function Swallow;Voicing initiated   Clinical Swallow Eval: Thin Liquid Texture Trial   Mode of Presentation, Thin Liquids cup;self-fed   Volume of Liquid or Food Presented sips of coffee, refused water   Oral Phase of Swallow WFL   Oral Residue (None noted)   Pharyngeal Phase of Swallow intact   Diagnostic Statement No overt s/s of aspiration   Clinical Swallow Eval: Puree Solid Texture Trial   Mode of Presentation, Puree spoon;self-fed   Volume of Puree Presented 5 bites of oatmeal   Oral Phase, Puree WFL   Oral Residue, Puree (None noted)   Pharyngeal Phase, Puree intact   Diagnostic Statement No overt s/s of aspiration   Clinical Swallow Eval: Semisolid Texture Trial   Mode of Presentation, Semisolid spoon;fed by clinician   Volume of Semisolid Food Presented single bite- refused further trials   Oral Phase, Semisolid WFL   Oral Residue, Semisolid (none noted)   Pharyngeal Phase, Semisolid intact   Diagnostic Statement No overt s/s of aspiraiton   Swallow Compensations   Swallow Compensations Alternate viscosity of consistencies;Pacing;Reduce amounts   Results No difficulties noted   General Therapy Interventions   Planned Therapy Interventions Dysphagia Treatment   Dysphagia treatment Instruction of safe swallow strategies   Intervention Comments Single follow up to ensure safety   Swallow Eval: Clinical Impressions   Skilled Criteria for Therapy Intervention Skilled criteria met.  Treatment indicated.   Dysphagia Outcome Severity Scale (UDAY) Level 6 - UDAY   Diet texture recommendations Regular diet;Thin liquids   Recommended Feeding/Eating Techniques alternate between small bites and sips of food/liquid;maintain upright posture during/after eating for 30 mins;small sips/bites   Therapy Frequency daily   Predicted Duration of Therapy Intervention (days/wks) 1-2 days   Anticipated Discharge Disposition other (see comments)   Risks and Benefits of Treatment have been explained.  "(back to [facility])   Patient, family and/or staff in agreement with Plan of Care Yes   Clinical Impression Comments SLP: Pt seen for bedside swallow evaluation. Pt presents with functional swallow however d/t dementia pt is an aspiration risk. No overt s/s of aspiraiton with oatmeal or coffee however pt's ROM is noted to be uncoordianted. Pt refused trials of water and only tolerated single bite of semi solids. Pt is very hard of  hearing and is moderately confused - refused trials of regular solids during assessment. Pt stating \" I am not hungry- I have no appetite.\" Recommend: Cont regular diet and thin liquids. Safe swallow strategies. SLP will follow up to ensure safety with regular diet. D/C back to SN[facility].    Total Evaluation Time   Total Evaluation Time (Minutes) 15[LB1.1]        Revision History        User Key Date/Time User Provider Type Action    > LB1.1 4/27/2017 10:04 AM Rubina Soriano, SLP Speech Language Sign            "

## 2017-04-26 NOTE — ED NOTES
Bed: ST01  Expected date:   Expected time:   Means of arrival:   Comments:  [facility] loss vision in left eye chest pain fall 89 female

## 2017-04-26 NOTE — IP AVS SNAPSHOT
"[place] [address] [facility]: [phone]                                              INTERAGENCY TRANSFER FORM - PHYSICIAN ORDERS   2017                    Hospital Admission Date: 2017  BE MACEDO   : 1923  Sex: Female        Attending Provider: Kayli Vega MD     Allergies:  Biaxin [Clarithromycin], Codeine, Nabumetone, Penicillins, Seafood, Sulfa Drugs    Infection:  None   Service:  HOSPITALIST    Ht:  1.727 m (5' 8\")   Wt:  76.2 kg (167 lb 14.4 oz)   Admission Wt:  76.2 kg (167 lb 14.4 oz)    BMI:  25.53 kg/m 2   BSA:  1.91 m 2            Patient PCP Information     Provider PCP Type    [facility]      ED Clinical Impression     Diagnosis Description Comment Added By Time Added    Vision changes [H53.9] Vision changes [H53.9]  Teresa Brown 2017  7:16 PM      Hospital Problems as of 2017              Priority Class Noted POA    Vision loss of left eye Medium  2017 Yes    Vision loss Medium  2017 Yes      Non-Hospital Problems as of 2017              Priority Class Noted    Left elbow fracture   2012    Type 2 diabetes, HbA1c goal < 7% (H)   2012    HTN (hypertension)   2012    Hypothyroidism   2012    Hyperlipidemia LDL goal <100   2012      Code Status History     Date Active Date Inactive Code Status Order ID Comments User Context    2017 10:42 AM  DNR/DNI [id number]  Teodoro Leonard MD Outpatient    2017  9:41 PM 2017 10:42 AM DNR/DNI [id number]  Patricio Ho MD Inpatient    2012 12:08 AM 2012  5:16 PM Full Code [id number]  Gareth Blanchard MD Inpatient         Medication Review      CONTINUE these medications which may have CHANGED, or have new prescriptions. If we are uncertain of the size of tablets/capsules you have at home, strength may be listed as something that might have changed.        Dose / Directions Comments    traMADol 50 MG tablet   Commonly " known as:  ULTRAM   This may have changed:    - how much to take  - when to take this  - reasons to take this  - additional instructions  - Another medication with the same name was removed. Continue taking this medication, and follow the directions you see here.   Used for:  Hip pain, right        Dose:  50 mg   Take 1 tablet (50 mg) by mouth every 6 hours as needed for pain maximum 4 tablet(s) per day   Quantity:  20 tablet   Refills:  0          CONTINUE these medications which have NOT CHANGED        Dose / Directions Comments    ACETAMINOPHEN PO        Dose:  1000 mg   Take 1,000 mg by mouth 3 times daily   Refills:  0        ASCORBIC ACID PO        Dose:  1000 mg   Take 1,000 mg by mouth daily   Refills:  0        ASPIRIN PO        Dose:  81 mg   Take 81 mg by mouth daily   Refills:  0        cholecalciferol 1000 UNIT tablet   Commonly known as:  vitamin D        Dose:  1 tablet   Take 1 tablet by mouth daily.   Refills:  0        ISOSORBIDE MONONITRATE PO        Dose:  5 mg   Take 5 mg by mouth 2 times daily 1/2 of 10mg tablet   Refills:  0        LEVOTHYROXINE SODIUM PO        Dose:  88 mcg   Take 88 mcg by mouth daily   Refills:  0        magnesium hydroxide 400 MG/5ML suspension   Commonly known as:  MILK OF MAGNESIA        Dose:  30 mL   Take 30 mLs by mouth daily as needed for constipation or heartburn   Refills:  0        mineral oil enema        Dose:  1 enema   Place 1 enema rectally daily as needed for constipation   Refills:  0        NITROGLYCERIN SL        Dose:  0.4 mg   Place 0.4 mg under the tongue every 5 minutes as needed for chest pain   Refills:  0        sennosides 8.6 MG tablet   Commonly known as:  SENOKOT        Dose:  1 tablet   Take 1 tablet by mouth daily as needed for constipation   Refills:  0                  Further instructions from your care team       Pt to d/c to [facility] at 1300 via daughter.     After Care     Activity - Up with nursing assistance            Advance Diet as Tolerated       Follow this diet upon discharge: Orders Placed This Encounter      Room Service      Consistent Carbohydrate Diet 2457-5430 Calories: Moderate Consistent CHO (4-6 CHO units/meal)       Fall precautions           General info for SNF       Length of Stay Estimate: Short Term Care: Estimated # of Days <30  Condition at Discharge: Stable  Level of care:skilled   Rehabilitation Potential: Fair  Admission H&P remains valid and up-to-date: Yes  Recent Chemotherapy: N/A  Use Nursing Home Standing Orders: Yes       Mantoux instructions       Give two-step Mantoux (PPD) Per Facility Policy Yes             Referrals     Occupational Therapy Adult Consult       Evaluate and treat as clinically indicated.    Reason: weakness       Physical Therapy Adult Consult       Evaluate and treat as clinically indicated.    Reason:  weakness             Follow-Up Appointment Instructions     Future Labs/Procedures    Follow Up and recommended labs and tests     Comments:    Follow up with [facility] physician.  The following labs/tests are recommended: f/u of pathology of temporal arterities.      Follow-Up Appointment Instructions     Follow Up and recommended labs and tests       Follow up with [facility] physician.  The following labs/tests are recommended: f/u of pathology of temporal arterities.             Statement of Approval     Ordered          04/30/17 1049  I have reviewed and agree with all the recommendations and orders detailed in this document.  EFFECTIVE NOW     Approved and electronically signed by:  Teodoro Leonard MD

## 2017-04-26 NOTE — ED NOTES
[facility]  ED Nurse Handoff Report    ED Chief complaint: Fall (fell two days ago, complains of chest pain now, worse with deep breathing and palpation) and Loss of Vision (loss vision in left eye approx. 4pm)      ED Diagnosis:   Final diagnoses:   None       Code Status: to be addressed by hospitalist    Allergies:   Allergies   Allergen Reactions     Biaxin [Clarithromycin]      Codeine      Nabumetone      Penicillins      Seafood      Sulfa Drugs        Activity level - Baseline/Home:  Stand with Assist  Is supposed to use a walker but rarely does    Activity Level - Current:   Same     Needed?: No    Isolation: No  Infection: Not Applicable    Bariatric?: No    Vital Signs:   Vitals:    04/26/17 1730 04/26/17 1736 04/26/17 1819   BP: 173/89  (!) 177/91   Resp: (!) 40 24 28   Temp: 98.2  F (36.8  C)     TempSrc: Oral     SpO2: 97% 95% 97%       Cardiac Rhythm: ,   Cardiac  Cardiac Rhythm: Normal sinus rhythm    Pain level:      Is this patient confused?: Yes   Slightly,  Hx of dementia    Patient Report: Initial Complaint: 89 year old female lives in Assisted Living. Fell two agos and was seen at [facility], hip xrays negative and sent home.  Now complains of increasing chest tightness and when daughter went to visit her today patient complained if loss of vision of her left eye.  Pt is normally very [place] and refuses to use a hearing aide.     Focused Assessment: Left eye vision is very blurry, patient unable to count fingers 12 inches in front of her face.     Tests Performed: Head CT, CXR, Bloods    Abnormal Results: pending    Treatments provided:     Family Comments: daughter is at bedside, is POA.      OBS brochure/video discussed/provided to patient: N/A    ED Medications:   Medications   sodium chloride 0.9 % for CT scan flush dose 100 mL (100 mLs Intravenous Given 4/26/17 1811)   iopamidol (ISOVUE-370) solution 117 mL (117 mLs Intravenous Given 4/26/17 1811)       Drips  infusing?:  No      ED NURSE PHONE NUMBER:  [phone]

## 2017-04-27 ENCOUNTER — APPOINTMENT (OUTPATIENT)
Dept: PHYSICAL THERAPY | Facility: CLINIC | Age: 82
DRG: 115 | End: 2017-04-27
Attending: INTERNAL MEDICINE
Payer: MEDICARE

## 2017-04-27 ENCOUNTER — APPOINTMENT (OUTPATIENT)
Dept: SPEECH THERAPY | Facility: CLINIC | Age: 82
DRG: 115 | End: 2017-04-27
Attending: INTERNAL MEDICINE
Payer: MEDICARE

## 2017-04-27 PROBLEM — H54.7 VISION LOSS: Status: ACTIVE | Noted: 2017-04-27

## 2017-04-27 LAB
CHOLEST SERPL-MCNC: 187 MG/DL
CORTICOSTER 1H P 250 UG ACTH SERPL-SCNC: 33.4 UG/DL
CORTIS SERPL-MCNC: 8.5 UG/DL (ref 4–22)
DEPRECATED CALCIDIOL+CALCIFEROL SERPL-MC: 50 UG/L (ref 20–75)
ERYTHROCYTE [SEDIMENTATION RATE] IN BLOOD BY WESTERGREN METHOD: 36 MM/H (ref 0–30)
GLUCOSE BLDC GLUCOMTR-MCNC: 126 MG/DL (ref 70–99)
GLUCOSE BLDC GLUCOMTR-MCNC: 145 MG/DL (ref 70–99)
GLUCOSE BLDC GLUCOMTR-MCNC: 165 MG/DL (ref 70–99)
GLUCOSE BLDC GLUCOMTR-MCNC: 173 MG/DL (ref 70–99)
HDLC SERPL-MCNC: 51 MG/DL
LDLC SERPL CALC-MCNC: 108 MG/DL
NONHDLC SERPL-MCNC: 136 MG/DL
TRIGL SERPL-MCNC: 140 MG/DL
TROPONIN I SERPL-MCNC: 0.02 UG/L (ref 0–0.04)
TROPONIN I SERPL-MCNC: 0.02 UG/L (ref 0–0.04)

## 2017-04-27 PROCEDURE — 25000132 ZZH RX MED GY IP 250 OP 250 PS 637: Mod: GY | Performed by: INTERNAL MEDICINE

## 2017-04-27 PROCEDURE — 97116 GAIT TRAINING THERAPY: CPT | Mod: GP | Performed by: PHYSICAL THERAPIST

## 2017-04-27 PROCEDURE — 99232 SBSQ HOSP IP/OBS MODERATE 35: CPT | Performed by: INTERNAL MEDICINE

## 2017-04-27 PROCEDURE — 97161 PT EVAL LOW COMPLEX 20 MIN: CPT | Mod: GP | Performed by: PHYSICAL THERAPIST

## 2017-04-27 PROCEDURE — 12000000 ZZH R&B MED SURG/OB

## 2017-04-27 PROCEDURE — 25000128 H RX IP 250 OP 636: Performed by: INTERNAL MEDICINE

## 2017-04-27 PROCEDURE — 99207 ZZC MOONLIGHTING INDICATOR: CPT | Performed by: INTERNAL MEDICINE

## 2017-04-27 PROCEDURE — 92610 EVALUATE SWALLOWING FUNCTION: CPT | Mod: GN

## 2017-04-27 PROCEDURE — 80061 LIPID PANEL: CPT | Performed by: INTERNAL MEDICINE

## 2017-04-27 PROCEDURE — 36415 COLL VENOUS BLD VENIPUNCTURE: CPT | Performed by: INTERNAL MEDICINE

## 2017-04-27 PROCEDURE — 40000916 ZZH STATISTIC SITTER, NIGHT HOURS

## 2017-04-27 PROCEDURE — 25000131 ZZH RX MED GY IP 250 OP 636 PS 637: Mod: GY | Performed by: INTERNAL MEDICINE

## 2017-04-27 PROCEDURE — 84484 ASSAY OF TROPONIN QUANT: CPT | Performed by: INTERNAL MEDICINE

## 2017-04-27 PROCEDURE — 85652 RBC SED RATE AUTOMATED: CPT | Performed by: INTERNAL MEDICINE

## 2017-04-27 PROCEDURE — A9270 NON-COVERED ITEM OR SERVICE: HCPCS | Mod: GY | Performed by: INTERNAL MEDICINE

## 2017-04-27 PROCEDURE — 25000132 ZZH RX MED GY IP 250 OP 250 PS 637: Performed by: INTERNAL MEDICINE

## 2017-04-27 PROCEDURE — 40000225 ZZH STATISTIC SLP WARD VISIT

## 2017-04-27 PROCEDURE — 82024 ASSAY OF ACTH: CPT | Performed by: INTERNAL MEDICINE

## 2017-04-27 PROCEDURE — 40000193 ZZH STATISTIC PT WARD VISIT: Performed by: PHYSICAL THERAPIST

## 2017-04-27 PROCEDURE — G0378 HOSPITAL OBSERVATION PER HR: HCPCS

## 2017-04-27 PROCEDURE — 40000894 ZZH STATISTIC OT IP EVAL DEFER

## 2017-04-27 PROCEDURE — 82533 TOTAL CORTISOL: CPT | Performed by: INTERNAL MEDICINE

## 2017-04-27 PROCEDURE — 00000146 ZZHCL STATISTIC GLUCOSE BY METER IP

## 2017-04-27 PROCEDURE — 97530 THERAPEUTIC ACTIVITIES: CPT | Mod: GP | Performed by: PHYSICAL THERAPIST

## 2017-04-27 PROCEDURE — 85652 RBC SED RATE AUTOMATED: CPT | Performed by: PSYCHIATRY & NEUROLOGY

## 2017-04-27 RX ORDER — DEXTROSE MONOHYDRATE 25 G/50ML
25-50 INJECTION, SOLUTION INTRAVENOUS
Status: DISCONTINUED | OUTPATIENT
Start: 2017-04-27 | End: 2017-04-30 | Stop reason: HOSPADM

## 2017-04-27 RX ORDER — TRAMADOL HYDROCHLORIDE 50 MG/1
50 TABLET ORAL 3 TIMES DAILY
Status: DISCONTINUED | OUTPATIENT
Start: 2017-04-27 | End: 2017-04-30 | Stop reason: HOSPADM

## 2017-04-27 RX ORDER — NICOTINE POLACRILEX 4 MG
15-30 LOZENGE BUCCAL
Status: DISCONTINUED | OUTPATIENT
Start: 2017-04-27 | End: 2017-04-30 | Stop reason: HOSPADM

## 2017-04-27 RX ORDER — ONDANSETRON 2 MG/ML
4 INJECTION INTRAMUSCULAR; INTRAVENOUS EVERY 6 HOURS PRN
Status: DISCONTINUED | OUTPATIENT
Start: 2017-04-27 | End: 2017-04-30 | Stop reason: HOSPADM

## 2017-04-27 RX ORDER — COSYNTROPIN 0.25 MG/ML
250 INJECTION, POWDER, FOR SOLUTION INTRAMUSCULAR; INTRAVENOUS ONCE
Status: COMPLETED | OUTPATIENT
Start: 2017-04-27 | End: 2017-04-27

## 2017-04-27 RX ADMIN — LEVOTHYROXINE SODIUM 88 MCG: 88 TABLET ORAL at 08:10

## 2017-04-27 RX ADMIN — ISOSORBIDE MONONITRATE 5 MG: 10 TABLET ORAL at 08:10

## 2017-04-27 RX ADMIN — TRAMADOL HYDROCHLORIDE 25 MG: 50 TABLET ORAL at 14:14

## 2017-04-27 RX ADMIN — TRAMADOL HYDROCHLORIDE 50 MG: 50 TABLET, COATED ORAL at 16:10

## 2017-04-27 RX ADMIN — ACETAMINOPHEN 1000 MG: 500 TABLET, FILM COATED ORAL at 08:10

## 2017-04-27 RX ADMIN — TRAMADOL HYDROCHLORIDE 25 MG: 50 TABLET ORAL at 00:58

## 2017-04-27 RX ADMIN — ACETAMINOPHEN 1000 MG: 500 TABLET, FILM COATED ORAL at 16:10

## 2017-04-27 RX ADMIN — SODIUM CHLORIDE 250 ML: 9 INJECTION, SOLUTION INTRAVENOUS at 12:54

## 2017-04-27 RX ADMIN — NITROGLYCERIN 0.4 MG: 0.4 TABLET SUBLINGUAL at 11:10

## 2017-04-27 RX ADMIN — COSYNTROPIN 250 MCG: 0.25 INJECTION, POWDER, LYOPHILIZED, FOR SOLUTION INTRAMUSCULAR; INTRAVENOUS at 13:10

## 2017-04-27 RX ADMIN — MAGNESIUM HYDROXIDE 30 ML: 400 SUSPENSION ORAL at 11:23

## 2017-04-27 RX ADMIN — INSULIN ASPART 1 UNITS: 100 INJECTION, SOLUTION INTRAVENOUS; SUBCUTANEOUS at 17:39

## 2017-04-27 RX ADMIN — TRAMADOL HYDROCHLORIDE 25 MG: 50 TABLET ORAL at 08:10

## 2017-04-27 RX ADMIN — INSULIN ASPART 1 UNITS: 100 INJECTION, SOLUTION INTRAVENOUS; SUBCUTANEOUS at 12:53

## 2017-04-27 RX ADMIN — ASPIRIN 81 MG 81 MG: 81 TABLET ORAL at 08:10

## 2017-04-27 ASSESSMENT — VISUAL ACUITY
OU: BASELINE
OU: BLURRED VISION;BASELINE
OU: BLURRED VISION;BASELINE
OU: BASELINE
OU: BLURRED VISION
OU: BLURRED VISION

## 2017-04-27 ASSESSMENT — PAIN DESCRIPTION - DESCRIPTORS
DESCRIPTORS: CONSTANT
DESCRIPTORS: CONSTANT

## 2017-04-27 NOTE — PROGRESS NOTES
Discussed with Dr Vega.  She feels pt should be changed to IP. Sent to [facility] for IP review. CM will continue to assist w/ dc planning.

## 2017-04-27 NOTE — PROGRESS NOTES
04/27/17 1100   Quick Adds   Type of Visit Initial PT Evaluation   Living Environment   Lives With alone   Living Arrangements assisted living   Home Accessibility no concerns   Self-Care   Usual Activity Tolerance moderate   Current Activity Tolerance fair   Regular Exercise yes   Activity/Exercise Type walking;strength training  (HHPT/OT past 3 weeks, Unsure of weekly frequency)   Functional Level Prior   Ambulation 0-->independent   Transferring 0-->independent   Toileting 0-->independent   Bathing 0-->independent   Dressing 0-->independent   Eating 0-->independent   Communication 0-->understands/communicates without difficulty   Swallowing 0-->swallows foods/liquids without difficulty   Cognition 2 - difficulty with organizing thoughts   Fall history within last six months yes   Number of times patient has fallen within last six months 1   Which of the above functional risks had a recent onset or change? fall history   Prior Functional Level Comment Pt lives alone at an assisted living facility, no concerns. Per discussion with pt's daughter via phone: pt received assist with medications, , daily walk, and 2 meals/day in dining room. Pt's daughter reports pt has been independent with bathing, toileting, and dressing, however reports concern about pt's hygiene and has been considering having assist with bathing. Pt's daughter also expresses concern that pt refuses to use walker. Pt's daughter reports pt has been receiving HHPT/OT for past 3 weeks d/t insidious onset knee pain/swelling and difficulty walking.    General Information   Onset of Illness/Injury or Date of Surgery - Date 04/26/17   Referring Physician Patricio Ho MD   Patient/Family Goals Statement Not stated   Pertinent History of Current Problem (include personal factors and/or comorbidities that impact the POC) Pt is a 89yo female admitted under observation for vision loss, pt's daughter also reports pt has been dizzy and  lightheaded in past couple weeks and had a mechanical fall 2 days ago.   Precautions/Limitations fall precautions   General Observations Pt resting in bed, appears comfortable. Pt very [place], no hearing aids.   Cognitive Status Examination   Orientation orientation to person, place and time   Level of Consciousness alert   Follows Commands and Answers Questions 75% of the time;able to follow single-step instructions   Personal Safety and Judgment at risk behaviors demonstrated   Memory impaired   Cognitive Comment Baseline dementia   Pain Assessment   Patient Currently in Pain No   Integumentary/Edema   Integumentary/Edema Comments BLEs pitting edema   Posture    Posture Forward head position;Protracted shoulders;Kyphosis   Range of Motion (ROM)   ROM Comment B LEs WNL for functional mobility   Strength   Strength Comments B LEs WFL for functional moblity with AD, unable to formally assess d/t cognition, appears generally deconditioned   Bed Mobility   Bed Mobility Comments SBA supine>sit   Transfer Skills   Transfer Comments CGA sit>stand to FWW   Gait   Gait Comments 10' with FWW and CGA/Avery for walker management at times, pt unsteady, tends to push walker off to side   Balance   Balance Comments Pt unsteady with initial stance, requries Avery. Pt bracing self in stance with back of LEs on bed   Sensory Examination   Sensory Perception Comments Denies N/T, very [place]   Coordination   Coordination Comments Gross motor coordination appears WNL   Muscle Tone   Muscle Tone no deficits were identified   General Therapy Interventions   Planned Therapy Interventions bed mobility training;gait training;neuromuscular re-education;ROM;strengthening;stretching;transfer training;progressive activity/exercise   Clinical Impression   Criteria for Skilled Therapeutic Intervention yes, treatment indicated   PT Diagnosis Impaired gait   Influenced by the following impairments Dizziness, positive orthostatics, weakness, decreased  "balance, cognition, decreased activity tolerance   Functional limitations due to impairments Decreased safety and independence with functional transfers and gait   Clinical Presentation Evolving/Changing   Clinical Presentation Rationale clinical judgment, positive orthostats, dizziness   Clinical Decision Making (Complexity) Low complexity   Therapy Frequency` 3x/week   Predicted Duration of Therapy Intervention (days/wks) 4 days   Anticipated Discharge Disposition Transitional Care Facility;Home with Home Therapy;Home with Assist   Risk & Benefits of therapy have been explained Yes   Patient, Family & other staff in agreement with plan of care Yes   Clinical Impression Comments OT screen complete, no skilled IP OT needs identified.   [facility] [employer]-PAC TM \"6 Clicks\"   2016, Trustees of [facility], under license to [employer].  All rights reserved.   6 Clicks Short Forms Basic Mobility Inpatient Short Form   [facility] AM-PAC  \"6 Clicks\" V.2 Basic Mobility Inpatient Short Form   1. Turning from your back to your side while in a flat bed without using bedrails? 4 - None   2. Moving from lying on your back to sitting on the side of a flat bed without using bedrails? 3 - A Little   3. Moving to and from a bed to a chair (including a wheelchair)? 3 - A Little   4. Standing up from a chair using your arms (e.g., wheelchair, or bedside chair)? 3 - A Little   5. To walk in hospital room? 3 - A Little   6. Climbing 3-5 steps with a railing? 2 - A Lot   Basic Mobility Raw Score (Score out of 24.Lower scores equate to lower levels of function) 18   Total Evaluation Time   Total Evaluation Time (Minutes) 11     "

## 2017-04-27 NOTE — PLAN OF CARE
Problem: Goal Outcome Summary  Goal: Goal Outcome Summary  Outcome: Improving  Disoriented to situation and time, intermittently to place, forgetful. Neuros intact, pt denying any blurred/double vision. [place], pocket talker in use. Orthostatic hypotension (see prev note), HR tachy at times, all other VSS. NS bolus 250cc given. Tele SR with BBB. Mod carb diet, SSI given at lunchtime. Up with assist of 1-2, GB, Walker. C/o left pain from contusion, PRN ultram given x1. Pt refusing PCD's, will apply ELIUD stockings. Plan for vascular surgery consult and possible temporal artery biopsy tomorrow.

## 2017-04-27 NOTE — PLAN OF CARE
Problem: Goal Outcome Summary  Goal: Goal Outcome Summary  Outcome: No Change  A&Oriented to self. Generalized weakness. Confuse. [place] bilateral ears, using pocket talker. Left eye blurry vision. High BP. Mode carb diet. Blood sugar  x2 less than 150. Up with 2 with belt to bedside commode. Chest pain which gets worse with deep breathing decreases with scheduled tylenol, tramadol and ice pack. MRI done-neg. Trops negative. Bruise on right hip and thigh from fall at home 2 weeks ago. Daughter took her rings and watch home. Patient does become anxious and needs to be reminded why she is in the Hospital.

## 2017-04-27 NOTE — PROGRESS NOTES
"SPIRITUAL HEALTH SERVICES  Spiritual Assessment Progress Note  [facility]      PRIMARY FOCUS:      Support for coping    ILLNESS CIRCUMSTANCES:    Reviewed documentation. Reflective conversation shared with patient  which integrated elements of illness and family narratives.   Patient was alert and sitting up in bed.  \" I don't hear well.\"  It was a one sided conversation as the patient could not hear well.      Context of Serious Illness/Symptom(s) - Patient reports that she does not know why she is in the hospital.  Forgets at times that this is not the assisted living facility that she has been living in.     Resources for Support -  Has one daughter Linh.  .   DISTRESS:      Emotional, Existential/Relational Distress - Patient reports that her   about 10 years ago and she lost a son in a car accident.  She lived in [place] but moved here to be closer to her daughter Linh.She was living in a AL facility. She is not sure how long she will be here but hopes that she will be able to get up and walk around. Patient is hard of hearing and states that she does not use hearing aids(?).    Spiritual/Jain Distress - None discussed     Social/Cultural/Economic Distress- None discussed.       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 Prayed rote prays which she was able to lead. Her adrianne is important to her.     Emotional, Relational, Existential Connections - Patient is very social and talkative today appears to enjoy the interaction.        GOALS OF CARE:    Goals of Care - Patient hopes to return to her AL facility.     Meaning/Sense-Making - Voices no distress but is unsure why she is here.       PLAN: will follow for spiritual support.      Ivon Mcneal  Chaplain Resident  Pager [phone]                               "

## 2017-04-27 NOTE — PROVIDER NOTIFICATION
"\"FYI: pt has been taking PTA Imdur, would you like this stopped? please advise, thanks!\"    Call back: Imdur d/c'd  "

## 2017-04-27 NOTE — PLAN OF CARE
Problem: Goal Outcome Summary  Goal: Goal Outcome Summary  Outcome: No Change  A&Oriented to self. Generalized weakness. Confuse. [place] bilateral ears, using pocket talker. Left eye blurry vision. High BP. Mode carb diet. Blood sugar check at 2000 was 139. Up with 2 with belt to bedside commode. Chest pain which gets worse with deep breathing decreases with scheduled tylenol, tramadol and ice pack. Plan MRI result pending.

## 2017-04-27 NOTE — PLAN OF CARE
Problem: Goal Outcome Summary  Goal: Goal Outcome Summary  PT: PT orders received, evaluation complete, treatment initiated. Pt is a 89yo female admitted under observation for vision loss, pt's daughter also reports pt has been dizzy and lightheaded in past couple weeks and had a mechanical fall 2 days ago. Pt lives alone at an assisted living facility, no concerns. Per discussion with pt's daughter via phone: pt received assist with medications, , daily walk, and 2 meals/day in dining room. Pt's daughter reports pt has been independent with bathing, toileting, and dressing, however reports concern about pt's hygiene and has been considering having assist with bathing. Pt's daughter also expresses concern that pt refuses to use walker. Pt's daughter reports pt has been receiving HHPT/OT for past 3 weeks d/t insidious onset knee pain/swelling and difficulty walking.      Pt requires SBA for supine>sit, however needing Avery for sit>supine for LEs. Pt requires CGA for sit<>stand transfers to FWW, CGA/Avery for gait 60'- therapist noted pt closing eyes and swaying, immediately sat pt down in chair. Pt reporting dizziness and some nausea/dry heaves, improved with seated rest. Pt able to ambulate 60' back to room with FWW and CGA, again reporting dizziness which improved in supine. Pt with positive orthostatics, see VSFS. RN and CC updated. If pt returns to [facility], would recommend 24hr assist with mobility, increased assist for ADLs, use of FWW at all times, and continued HHPT/OT services. If unable to meet pt's current needs, pt would need TCU. Will continue to monitor.

## 2017-04-27 NOTE — PHARMACY-ADMISSION MEDICATION HISTORY
Admission medication history interview status for the 4/26/2017  admission is complete. See EPIC admission navigator for prior to admission medications     Medication history source reliability:Good - list from facility    Actions taken by pharmacist (provider contacted, etc):Called facility to verify last admin times.     Additional medication history information not noted on PTA med list : She received extra Aspirin 81mg x4 today.    Medication reconciliation/reorder completed by provider prior to medication history? No    Time spent in this activity: 15 min    Prior to Admission medications    Medication Sig Last Dose Taking? Auth Provider   ASPIRIN PO Take 81 mg by mouth daily 4/26/2017 at am Yes Reported, Patient   TRAMADOL HCL PO Take 25 mg by mouth 3 times daily  4/26/2017 at x1 Yes Reported, Patient   NITROGLYCERIN SL Place 0.4 mg under the tongue every 5 minutes as needed for chest pain 4/26/2017 at Unknown time Yes Unknown, Entered By History   LEVOTHYROXINE SODIUM PO Take 88 mcg by mouth daily 4/26/2017 at am Yes Unknown, Entered By History   ASCORBIC ACID PO Take 1,000 mg by mouth daily 4/26/2017 at am Yes Unknown, Entered By History   ACETAMINOPHEN PO Take 1,000 mg by mouth 3 times daily 4/26/2017 at x1 Yes Unknown, Entered By History   ISOSORBIDE MONONITRATE PO Take 5 mg by mouth 2 times daily 1/2 of 10mg tablet 4/26/2017 at am Yes Unknown, Entered By History   TRAMADOL HCL PO Take 25 mg by mouth 3 times daily as needed for moderate to severe pain prn Yes Unknown, Entered By History   mineral oil enema Place 1 enema rectally daily as needed for constipation prn Yes Unknown, Entered By History   magnesium hydroxide (MILK OF MAGNESIA) 400 MG/5ML suspension Take 30 mLs by mouth daily as needed for constipation or heartburn prn Yes Unknown, Entered By History   sennosides (SENOKOT) 8.6 MG tablet Take 1 tablet by mouth daily as needed for constipation prn Yes Unknown, Entered By History   cholecalciferol  (VITAMIN D) 1000 UNIT tablet Take 1 tablet by mouth daily. 4/26/2017 at am Yes Unknown, Entered By History

## 2017-04-27 NOTE — PROVIDER NOTIFICATION
"Text pg to MD, \"pt c/o L chest pain below breast when she takes a deep breath. Nitro give x1 with no relief but SBP dropped from 130 to 98. Tele ST w/ BBB. Please advise, thanks!\"  "

## 2017-04-27 NOTE — CONSULTS
[facility] [place]  [facility]    Vascular Neurology / Stroke Consultation Note     Olga Zelaya MRN# [id number]   YOB: 1923 Age: 89 year old    Code Status:DNR/DNI   Date of Admission: 4/26/2017  Date of Consult: 04/27/2017    _________________________________   Primary Care Physician   Joe Pelaez  ______________________________________________         Assessment & Plan   ______________________________________________  (H54.62) Vision loss of left eye  (primary encounter diagnosis)  --Highly elevated CRP, will check ESR  --Possible GCA.   --Discuss with family possible temporal artery biopsy, given severe dementia  -----Family wants to proceed with vascular biopsy  --MRI brain was unremarkable  (I67.1) Cerebral aneurysm, nonruptured  --Small aneurysm, should not be addressed at patient's age  (G30.1,  F02.80) Late onset Alzheimer's disease without behavioral disturbance  --Baseline dementia  #. DVT Prophylaxis  --Mechanical   #. PT/OT/Speech  --Start  evaluations  #. Nutrition / GI Prophylaxis  --Per recommendations of speech therapy      #. Code Status: DNR / DNI    ----------------------------------------------------------------------------------  ----------------------------------------------------------------------------------  Reason for consult: I was asked by Dr. Ho to evaluate this patient for vision loss.    Chief Complaint   ______________________________________________  Vision loss  History is obtained from the patient    History of Present Illness   ______________________________________________  89-year-old female with history of dementia, hypertension, hyperlipidemia and hypothyroidism. She lives in an assisted living facility. Per daughter, the patient has been somewhat lightheaded and dizzy lately. She apparently suffered from a mechanical fall 2 days prior to admission. She was seen at [facility] for the fall. CT head without  contrast and hip/pelvis x-ray were negative for acute pathology. The patient sustained right buttocks contusion. She has been having left chest wall and right hip pain since the episode of fall 2 days prior to admission. On 4/26/17 the patient completed her physical therapy at the assisted living facility and was escorted back to her room when she acutely started to complain of left eye vision loss. I am not quite clear whether it is vision loss or blurry vision. The patient has dementia and unable to elaborate. Per patient's daughter, she thinks this blurry vision came on 2 hours prior to arrival to the ED. No other neurologic deficit. CT head without contrast, CTA of the head and neck and CT head with contrast were all negative for acute pathology. The patient's left eye blurry vision (versus vision loss ) persisted. Dr. Olivares of Neurology recommended for the patient to be admitted and to undergo complete stroke workup.   MRI brain did not show any acute stroke  Overnight, Not clear if vision is better. She is very hard hearing and demented and can not specify if her vision is better. No weakness.   Past Medical History    ______________________________________________  Past Medical History:   Diagnosis Date     Dementia      Diabetes mellitus (H)      High cholesterol      Hypothyroid      Past Surgical History   ______________________________________________  Past Surgical History:   Procedure Laterality Date     APPENDECTOMY       CHOLECYSTECTOMY       Prior to Admission Medications   ______________________________________________  Prior to Admission Medications   Prescriptions Last Dose Informant Patient Reported? Taking?   ACETAMINOPHEN PO 4/26/2017 at x1  Yes Yes   Sig: Take 1,000 mg by mouth 3 times daily   ASCORBIC ACID PO 4/26/2017 at am  Yes Yes   Sig: Take 1,000 mg by mouth daily   ASPIRIN PO 4/26/2017 at am  Yes Yes   Sig: Take 81 mg by mouth daily   ISOSORBIDE MONONITRATE PO 4/26/2017 at am  Yes  Yes   Sig: Take 5 mg by mouth 2 times daily 1/2 of 10mg tablet   LEVOTHYROXINE SODIUM PO 4/26/2017 at am  Yes Yes   Sig: Take 88 mcg by mouth daily   NITROGLYCERIN SL 4/26/2017 at Unknown time  Yes Yes   Sig: Place 0.4 mg under the tongue every 5 minutes as needed for chest pain   TRAMADOL HCL PO 4/26/2017 at x1  Yes Yes   Sig: Take 25 mg by mouth 3 times daily    TRAMADOL HCL PO prn  Yes Yes   Sig: Take 25 mg by mouth 3 times daily as needed for moderate to severe pain   cholecalciferol (VITAMIN D) 1000 UNIT tablet 4/26/2017 at am  Yes Yes   Sig: Take 1 tablet by mouth daily.   magnesium hydroxide (MILK OF MAGNESIA) 400 MG/5ML suspension prn  Yes Yes   Sig: Take 30 mLs by mouth daily as needed for constipation or heartburn   mineral oil enema prn  Yes Yes   Sig: Place 1 enema rectally daily as needed for constipation   sennosides (SENOKOT) 8.6 MG tablet prn  Yes Yes   Sig: Take 1 tablet by mouth daily as needed for constipation      Facility-Administered Medications: None     Allergies   Allergies   Allergen Reactions     Biaxin [Clarithromycin]      Codeine      Nabumetone      Penicillins      Seafood      Sulfa Drugs        Social History   ______________________________________________  Social History     Social History     Marital status:      Spouse name: N/A     Number of children: N/A     Years of education: N/A     Social History Main Topics     Smoking status: Never Smoker     Smokeless tobacco: None     Alcohol use No     Drug use: No     Sexual activity: Not Asked     Other Topics Concern     None     Social History Narrative       Family History   ______________________________________________  Family History   Problem Relation Age of Onset     DIABETES Mother          Review of Systems   ______________________________________________  Review of systems is not obtainable due to patient factors - mental status and dementia      Physical Exam  "  ______________________________________________  Weight:167 lbs 14.4 oz; Height:5' 8\"  Temp: 98.3  F (36.8  C) Temp src: Oral BP: 134/75   Heart Rate: 95 Resp: 18 SpO2: 93 % O2 Device: None (Room air)    General Appearance:  No acute distress  Neuro:       Mental Status Exam:   Awake, alert, disoriented X3. Speech and language are intact. Mental status is normal, demented       Cranial Nerves: Not able to assess visual acuity due to dementia.  Pupils 2 mm, reactive. EOMI. Face sensation is normal. Face is symmetric. Profound hearing loss. Tongue and uvula are midline. Other CN are normal           Motor:  5/5 X 4. Tone and bulk are normal           Reflexes:  Normal DTR.Toes downgoing.        Sensory:  Grossly intact                Coordination:  Grossly intact         Gait:  Untestable   Neck: no nuchal rigidity, normal thyroid. No carotid bruits.    Cardiovascular: Regular rate and rhythm, no m/r/g  Lungs: Clear to auscultation  Abdomen: Soft, not tender, not distended  Extremities: No clubbing, no cyanosis, no edema    Data   ______________________________________________  All Data personally reviewed:       Labs:   CBC RESULTS:     Recent Labs  Lab 04/26/17  1725 04/24/17  1042   WBC 6.8 7.2   RBC 3.46* 3.70*   HGB 9.9* 10.6*   HCT 30.4* 32.1*    207     Basic Metabolic Panel:   Recent Labs   Lab Test  04/26/17   1725  04/24/17   1042  11/21/14   1218   NA  136  135  133   POTASSIUM  4.5  4.3  4.3   CHLORIDE  100  101  99   CO2  27  26  30   BUN  34*  25  25   CR  1.26*  0.99  0.91   GLC  139*  141*  128*   RADHA  8.4*  8.7  8.9     Liver panel:  Recent Labs   Lab Test  02/19/12   2250   PROTTOTAL  6.5*   ALBUMIN  3.6   BILITOTAL  1.1   ALKPHOS  78   AST  35   ALT  13     INR:  Recent Labs   Lab Test  08/09/13   2115  02/19/12   2250   INR  1.00  1.05      Lipid Profile:  Recent Labs   Lab Test  04/27/17   0557   CHOL  187   HDL  51   LDL  108*   TRIG  140     Thyroid Panel:  Recent Labs   Lab Test  " 04/26/17   1725   TSH  3.75      Vitamin B12: No lab results found.   Vitamin D level: No lab results found.  A1C:   Recent Labs   Lab Test  04/26/17   1725  02/19/12   2250   A1C  7.3*  5.9     Troponin I:   Recent Labs   Lab Test  04/27/17   0557  04/27/17   0040  04/26/17   1725  04/24/17   1042   TROPI  0.020  0.022  <0.015  The 99th percentile for upper reference range is 0.045 ug/L.  Troponin values in   the range of 0.045 - 0.120 ug/L may be associated with risks of adverse   clinical events.    0.019     Ammonia: No lab results found.  CK: No lab results found.     CRP inflammation:   Recent Labs   Lab Test  04/26/17 1725   CRP  73.5*     ESR: No lab results found.    PRAFUL: No lab results found.    ANCA: No lab results found.   Drug Screen: No lab results found.  Alcohol level:No lab results found.  UA Results:  Recent Labs   Lab Test  04/24/17   1200   COLOR  Yellow   APPEARANCE  Clear   URINEGLC  Negative   URINEBILI  Negative   URINEKETONE  Negative   SG  1.012   UBLD  Negative   URINEPH  6.0   PROTEIN  Negative   NITRITE  Negative   LEUKEST  Trace*   RBCU  1   WBCU  4*     Most Recent 6 Bacteria Isolates From Any Culture (See EPIC Reports for Culture Details):No lab results found.     Cardiac US:   --           Imaging:   All imaging studies were reviewed personally  CT head:   1. Atrophy and chronic white matter disease.  2. Nothing acute.  3. No interval change.  4. CT angiogram to follow.  CTA neck/head:  1. Nothing acute.  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  MRI brain:   1. Atrophy and chronic white matter disease.  2. Nothing clearly acute.  3. Motion artifact.

## 2017-04-27 NOTE — PLAN OF CARE
Problem: Goal Outcome Summary  Goal: Goal Outcome Summary  OT: Order received and chart reviewed. Pt admitted under observation after visual changes with recent fall at [facility]. Per discussion with PT, pt would benefit from 24 hr supervision with A for ADLs and transfers and home OT/PT. If this level of A cannot be provided at [facility], pt would need TCU stay. Defer OT eval and intervention to next level of care.

## 2017-04-27 NOTE — PROGRESS NOTES
[facility]  Hospitalist Progress Note          Assessment and Plan:   A 89-year-old female with history of hypertension, hyperlipidemia, diet-controlled diabetes and dementia. She acutely developed left eye blurry vision earlier tonight, 2 hours prior to coming to the ED. Workup thus far is negative.       Acute left eye blurry vision (versus vision loss):  -  Etiology of patient's vision loss is unclear. CT head without contrast and CT head perfusion are negative. Will obtain MRI brain tonight. Echocardiogram with bubble study, hemoglobin A1c and fasting lipid profile will be checked.   - Neurology consulted appreciated  - Poss Temporal Arteritis being considered (CRP- 73.5, ESR- 36 ), Vas surgery consulted for poss bx.     Hx of Hypertension with orthostatic Hypotension:  - likely dehydrated, possible Temporal arteritis, r/o adrenal insuffiencey( given falls, hypotension, elevated CRP)  - give IV fluid bolus 500 cc, continue to hold PTA  Imdur & check cosyntropin test in am, further plans for Temporal artery bx per neurology.   - has  p.r.n. labetalol and hydralazine.       Diabetes mellitus type 2:   - A1c 7.3  - Appears to be diet-controlled.   - start ISS low      Hyperlipidemia: Not on any lipid-lowering medications. Check fasting lipid profile tomorrow morning and consider starting statin.       Acute renal failure:   - Likely prerenal. Patient's baseline creatinine is 0.9  - Admit creatinine is 1.26  - orthostatic this am, symtomatic, give 500 cc fluid bolus, add knee high TEDS, avoid nephrotoxins.  - recheck BMP in am      Recent fall resulting in right hip and left-sided chest wall pain:  - increased pain with exertion/deep breathing  - increase scheduled PTA tramadol 25 -> 50 mg 3/day( with hold for lethergy) & scheduled Tylenol.   - also has IV Dilaudid as needed.       Deep venous thrombosis prophylaxis: Sequentials.       CODE STATUS: DNR/DNI     Kayli Vega  "MD  Hospitalist                Interval History:   C/o pleuritic type L sided chest pain with movement.Pt evaluated with RN present. Denies pain at present except when pr applied.V [place]              Medications:       sodium chloride 0.9%  250 mL Intravenous Once     cosyntropin  250 mcg Intravenous Once     traMADol (ULTRAM) tablet 50 mg  50 mg Oral TID     acetaminophen (TYLENOL) tablet 1,000 mg  1,000 mg Oral TID     aspirin chewable tablet 81 mg  81 mg Oral Daily     isosorbide mononitrate (ISMO/MONOKET) half-tab 5 mg  5 mg Oral BID AC     levothyroxine (SYNTHROID/LEVOTHROID) tablet 88 mcg  88 mcg Oral Daily     magnesium hydroxide, mineral oil, nitroglycerin (NITROSTAT) sublingual tablet 0.4 mg, sennosides, traMADol (ULTRAM) half-tab 25 mg, - MEDICATION INSTRUCTIONS -, enalaprilat, HYDROmorphone, naloxone               Physical Exam:   Blood pressure 94/63, temperature 98.1  F (36.7  C), temperature source Oral, resp. rate 18, height 1.727 m (5' 8\"), weight 76.2 kg (167 lb 14.4 oz), SpO2 98 %.  Wt Readings from Last 4 Encounters:   17 76.2 kg (167 lb 14.4 oz)   17 69.9 kg (154 lb)   13 54.4 kg (120 lb)   13 72.6 kg (160 lb)         Vital Sign Ranges  Temperature Temp  Av.1  F (36.7  C)  Min: 98  F (36.7  C)  Max: 98.3  F (36.8  C)   Blood pressure Systolic (24hrs), Av , Min:94 , Max:177        Diastolic (24hrs), Av, Min:63, Max:97      Pulse No Data Recorded   Respirations Resp  Av.2  Min: 18  Max: 40   Pulse oximetry SpO2  Av.3 %  Min: 93 %  Max: 98 %         Intake/Output Summary (Last 24 hours) at 17 1210  Last data filed at 17 0930   Gross per 24 hour   Intake              240 ml   Output              575 ml   Net             -335 ml       Constitutional: [place], awake, alert, cooperative, no apparent distress   Lungs: Clear to auscultation bilaterally, no crackles or wheezing   Cardiovascular: Regular rate and rhythm, normal S1 and S2, and no murmur " noted   Abdomen: Normal bowel sounds, soft, non-distended, non-tender   Skin: No rashes, no cyanosis, no edema   chest Tenderness over L ant lower 1/2 rib cage/ chest.no crepitus               Data:   All laboratory data reviewed

## 2017-04-27 NOTE — PROGRESS NOTES
04/27/17 0900   General Information   Onset Date 04/26/17   Start of Care Date 04/27/17   Referring Physician Vic   Patient/Family Goals Statement Pt did not state   Swallowing Evaluation Bedside swallow evaluation   Behaviorial Observations Confused   Mode of current nutrition Oral diet   Type of oral diet Regular;Thin liquid   Respiratory Status Room air   Comments Baseline dementia and hard of hearing admitted 2 days post fall.   Clinical Swallow Evaluation   Oral Musculature generally intact   Structural Abnormalities none present   Dentition present and adequate   Secretion Management (WNL)   Mucosal Quality good   Mandibular Strength and Mobility intact   Oral Labial Strength and Mobility impaired coordination   Lingual Strength and Mobility impaired coordination   Velar Elevation intact   Buccal Strength and Mobility (Did not assess)   Laryngeal Function Swallow;Voicing initiated   Clinical Swallow Eval: Thin Liquid Texture Trial   Mode of Presentation, Thin Liquids cup;self-fed   Volume of Liquid or Food Presented sips of coffee, refused water   Oral Phase of Swallow WFL   Oral Residue (None noted)   Pharyngeal Phase of Swallow intact   Diagnostic Statement No overt s/s of aspiration   Clinical Swallow Eval: Puree Solid Texture Trial   Mode of Presentation, Puree spoon;self-fed   Volume of Puree Presented 5 bites of oatmeal   Oral Phase, Puree WFL   Oral Residue, Puree (None noted)   Pharyngeal Phase, Puree intact   Diagnostic Statement No overt s/s of aspiration   Clinical Swallow Eval: Semisolid Texture Trial   Mode of Presentation, Semisolid spoon;fed by clinician   Volume of Semisolid Food Presented single bite- refused further trials   Oral Phase, Semisolid WFL   Oral Residue, Semisolid (none noted)   Pharyngeal Phase, Semisolid intact   Diagnostic Statement No overt s/s of aspiraiton   Swallow Compensations   Swallow Compensations Alternate viscosity of consistencies;Pacing;Reduce amounts   Results  "No difficulties noted   General Therapy Interventions   Planned Therapy Interventions Dysphagia Treatment   Dysphagia treatment Instruction of safe swallow strategies   Intervention Comments Single follow up to ensure safety   Swallow Eval: Clinical Impressions   Skilled Criteria for Therapy Intervention Skilled criteria met.  Treatment indicated.   Dysphagia Outcome Severity Scale (UDAY) Level 6 - UDAY   Diet texture recommendations Regular diet;Thin liquids   Recommended Feeding/Eating Techniques alternate between small bites and sips of food/liquid;maintain upright posture during/after eating for 30 mins;small sips/bites   Therapy Frequency daily   Predicted Duration of Therapy Intervention (days/wks) 1-2 days   Anticipated Discharge Disposition other (see comments)   Risks and Benefits of Treatment have been explained. (back to [facility])   Patient, family and/or staff in agreement with Plan of Care Yes   Clinical Impression Comments SLP: Pt seen for bedside swallow evaluation. Pt presents with functional swallow however d/t dementia pt is an aspiration risk. No overt s/s of aspiraiton with oatmeal or coffee however pt's ROM is noted to be uncoordianted. Pt refused trials of water and only tolerated single bite of semi solids. Pt is very hard of  hearing and is moderately confused - refused trials of regular solids during assessment. Pt stating \" I am not hungry- I have no appetite.\" Recommend: Cont regular diet and thin liquids. Safe swallow strategies. SLP will follow up to ensure safety with regular diet. D/C back to SN.    Total Evaluation Time   Total Evaluation Time (Minutes) 15     "

## 2017-04-27 NOTE — H&P
DATE OF ADMISSION:  04/26/2017      PRIMARY CARE PROVIDER:  Joe Pelaez MD, [facility]      CHIEF COMPLAINT:  Acute onset left eye blurry vision.      HISTORY OF PRESENT ILLNESS:  Olga Zelaya is a 89-year-old female with history of dementia, hypertension, hyperlipidemia and hypothyroidism.  She lives in an assisted living facility.  Per daughter, the patient has been somewhat lightheaded and dizzy lately.  She apparently suffered from a mechanical fall 2 days ago.  She was seen at [facility] for the fall.  CT head without contrast and hip/pelvis x-ray were negative for acute pathology.  The patient sustained right buttocks contusion and has been having left chest wall and right hip pain since.  Today the patient completed her physical therapy at the assisted living facility and was escorted back to her room when she acutely started to complain of left eye blurry vision.  I am not quite clear whether it is a vision loss or blurry vision.  The patient has dementia and unable to elaborate.  Per patient's daughter, she thinks this blurry vision came on 2 hours prior to arrival to the ED.  No other neurologic deficit.  CT head without contrast, CTA of the head and neck and CT head with contrast were all negative for acute pathology.  The patient's left eye symptom persisted.  Admit for further work up of stroke.      PAST MEDICAL HISTORY:   1.  Hypertension (not on any meds except for Imdur).   2.  Hyperlipidemia (not on any meds).     3.  Hypothyroidism.   4.  Anemia of chronic disease.   5.  Dementia.      PAST SURGICAL HISTORY:  Appendectomy and cholecystectomy.      ALLERGIES:  Biaxin, codeine, nabumetone, penicillin, seafood and sulfa drugs.      OUTPATIENT MEDICATIONS:   1.  Aspirin 81 mg p.o. daily.   2.  Nitroglycerin sublingual q.5 minutes p.r.n.   3.  Levothyroxine 88 mcg p.o. daily.   4.  Ascorbic acid 1000 mg p.o. daily.   5.  Tylenol 1000 mg p.o. t.i.d.   6.   Isosorbide mononitrate 5 mg p.o. b.i.d.    7.  Tramadol 25 mg p.o. t.i.d. p.r.n.   8.  Mineral oil enema daily p.r.n. constipation.   9.  Milk of magnesia 30 mL daily p.r.n. constipation.   10.  Senna 1 tablet p.o. daily p.r.n. constipation.   11.  Vitamin D3, 1000 units p.o. daily.      SOCIAL HISTORY:  Lives in an assisted living facility.  Never smoker, no alcohol or IV drug use.      FAMILY HISTORY:  Reviewed and noncontributory to patient's current admission.      REVIEW OF SYSTEMS:  Ten organ systems were checked and were all negative other than what was mentioned in HPI.      PHYSICAL EXAMINATION:   VITAL SIGNS:  Temperature 98.2, blood pressure is 173/89, heart rate 90, respirations 24, 95% saturation on room air.   GENERAL:  Hard of hearing, does not follow commands appropriately.  Moderate distress due to right hip and left chest wall pain.   HEENT:  Normocephalic, atraumatic.  Pupils equal, round, reactive to light.  Oropharynx is clear, mucous membranes moist.   NECK:  Supple, no thyromegaly.   CARDIOVASCULAR:  Normal S1, S2, no murmurs, rubs or gallops.   LUNGS:  Clear to auscultation bilaterally.   ABDOMEN:  Soft, good bowel sounds, nontender, no rebound or guarding.   EXTREMITIES:  No cyanosis or clubbing.   LYMPHATICS:  No edema.   NEUROLOGIC:  Nonfocal.   SKIN:  No rash.   MUSCULOSKELETAL:  No calf tenderness to palpation.   PSYCHIATRIC:  Follows simple commands appropriately, mood and affect are appropriate.        IMAGING STUDIES:  Personally reviewed by me.   CT head without contrast, 04/26/2017, impression:   1.  Atrophy and chronic white matter disease.     2.  No acute intracranial pathology.   3.  No interval change.      CT angio head and neck:  Results pending.      CT head with contrast, 04/26/2017, impression:  Normal CT perfusion of the brain.      Chest x-ray, AP and lateral, 04/26/2017, impression:  The lungs appear grossly clear and probably unchanged, given differences in projection,  when compared to 11/21/2014.  The aortic arch calcification is noted.        EKG:  Personally reviewed by me.  Sinus rhythm, ventricular rate 100, no ischemia.      LABORATORY DATA:  Sodium 136, potassium 4.5, chloride 100, carbon dioxide 27, BUN 34, creatinine 1.26, glucose 139, calcium 8.4.  Troponin less than 0.015.  WBC 6.8, hemoglobin 9.9 and platelet count 193,000.        ASSESSMENT AND PLAN:  A 89-year-old female with history of hypertension, hyperlipidemia, diet-controlled diabetes and dementia.  She acutely developed left eye blurry vision earlier tonight, 2 hours prior to coming to the ED.  Workup thus far is negative.     1.   Acute left eye blurry vision (versus vision loss):   Etiology of patient's vision loss is unclear, but differential diagnosis includes acute cerebrovascular accident versus retinal artery occlusion.  CT head without contrast and CT head perfusion are negative.  Will obtain MRI brain tonight.  Echocardiogram with bubble study, hemoglobin A1c and fasting lipid profile will be checked.  Neurology Service will be consulted.     2.   Hypertension:   Resume PTA Imdur and aspirin.  Add p.r.n. labetalol and hydralazine.     3.   Diabetes mellitus type 2:   Appears to be diet-controlled.  Check A1c.     4.   Hyperlipidemia:   Not on any lipid-lowering medications.  Check fasting lipid profile tomorrow morning and consider starting statin.     5.   Acute renal failure:   Likely prerenal.  Admit creatinine is 1.26; however, patient's baseline creatinine is 0.9.  Will gently hydrate her with IV fluid.  Avoid nephrotoxins.     6.   Recent fall resulting in right hip and left-sided chest wall pain:   Resume PTA tramadol and scheduled Tylenol.  I will add IV Dilaudid.     7.   Deep venous thrombosis prophylaxis:   Sequentials.      CODE STATUS:   DNR/DNI per patient's daughter.         KATHRIN DONG MD             D: 04/26/2017 19:49   T: 04/26/2017 21:50   MT: mg      Name:     BE MACEDO    MRN:      [id number]        Account:      [id number]   :      1923           Admitted:     [id number]      Document: [id number]       cc: Joe Pelaez MD

## 2017-04-27 NOTE — PROVIDER NOTIFICATION
04/27/17 1118   Lying Orthostatic BP   Lying Orthostatic /80   Lying Orthostatic Pulse 106 bpm   Sitting Orthostatic BP   Sitting Orthostatic /77   Sitting Orthostatic Pulse 104 bpm   Standing Orthostatic BP   Standing Orthostatic BP 98/56   Standing Orthostatic Pulse 110 bpm     Pt became nauseous and dizzy when walking with PT. Orthostatic BP checked (see data above). Pt will c/o of nausea when sitting up in bed as well, resolved when HOB lowered.     Dr. Bull notified.

## 2017-04-27 NOTE — PROGRESS NOTES
Met w/ dtr Linh to discuss obs care admit status and subsequent change to IP today.  OP/Obs brochure discussed and given. Explained that IP status starts today and isn't retroactive.  Pt may need more assist at dc than she currently gets at her longterm.  CM will continue to follow.

## 2017-04-27 NOTE — PLAN OF CARE
Problem: Goal Outcome Summary  Goal: Goal Outcome Summary  SLP: Pt seen for bedside swallow evaluation. RN states pt did pass swallow screening. Pt presents with weak but normal swallow however d/t pt's confusion and dementia she remains an aspiration risk. Pt refused water and regular solids but tolerated coffee, oatmeal, and semi solids without overt s/s of aspiration. Pt states she does not have a big appetite at this time. Pt remains a risk d/t confusion- Recommend: Cont regular diet and thin liquids. Safe swallow strategies including small bites/sips, upright for meals, feed only when alert. D/C back to SNF. SLP will return tomorrow to ensure cont safety with regular diet.

## 2017-04-27 NOTE — PROVIDER NOTIFICATION
Page to hospitalist: re timing of troponins, IVF?, pt may not be able to perform MRI tonight, restless.     Change trops to q6 timed from the first one done in ER. No IVF. Ok if unable to preform MRI tonight.

## 2017-04-28 ENCOUNTER — APPOINTMENT (OUTPATIENT)
Dept: PHYSICAL THERAPY | Facility: CLINIC | Age: 82
DRG: 115 | End: 2017-04-28
Payer: MEDICARE

## 2017-04-28 ENCOUNTER — SURGERY (OUTPATIENT)
Age: 82
End: 2017-04-28

## 2017-04-28 ENCOUNTER — ANESTHESIA (OUTPATIENT)
Dept: SURGERY | Facility: CLINIC | Age: 82
DRG: 115 | End: 2017-04-28
Payer: MEDICARE

## 2017-04-28 ENCOUNTER — ANESTHESIA EVENT (OUTPATIENT)
Dept: SURGERY | Facility: CLINIC | Age: 82
DRG: 115 | End: 2017-04-28
Payer: MEDICARE

## 2017-04-28 LAB
ACTH PLAS-MCNC: 28 PG/ML
ANION GAP SERPL CALCULATED.3IONS-SCNC: 6 MMOL/L (ref 3–14)
BUN SERPL-MCNC: 23 MG/DL (ref 7–30)
CALCIUM SERPL-MCNC: 8.6 MG/DL (ref 8.5–10.1)
CHLORIDE SERPL-SCNC: 102 MMOL/L (ref 94–109)
CO2 SERPL-SCNC: 29 MMOL/L (ref 20–32)
CREAT SERPL-MCNC: 1.04 MG/DL (ref 0.52–1.04)
ERYTHROCYTE [DISTWIDTH] IN BLOOD BY AUTOMATED COUNT: 14.2 % (ref 10–15)
ERYTHROCYTE [SEDIMENTATION RATE] IN BLOOD BY WESTERGREN METHOD: 34 MM/H (ref 0–30)
GFR SERPL CREATININE-BSD FRML MDRD: 49 ML/MIN/1.7M2
GLUCOSE BLDC GLUCOMTR-MCNC: 106 MG/DL (ref 70–99)
GLUCOSE BLDC GLUCOMTR-MCNC: 108 MG/DL (ref 70–99)
GLUCOSE BLDC GLUCOMTR-MCNC: 123 MG/DL (ref 70–99)
GLUCOSE BLDC GLUCOMTR-MCNC: 140 MG/DL (ref 70–99)
GLUCOSE SERPL-MCNC: 111 MG/DL (ref 70–99)
HCT VFR BLD AUTO: 28.2 % (ref 35–47)
HGB BLD-MCNC: 9.1 G/DL (ref 11.7–15.7)
MCH RBC QN AUTO: 28.2 PG (ref 26.5–33)
MCHC RBC AUTO-ENTMCNC: 32.3 G/DL (ref 31.5–36.5)
MCV RBC AUTO: 87 FL (ref 78–100)
PLATELET # BLD AUTO: 175 10E9/L (ref 150–450)
POTASSIUM SERPL-SCNC: 4.3 MMOL/L (ref 3.4–5.3)
RBC # BLD AUTO: 3.23 10E12/L (ref 3.8–5.2)
SODIUM SERPL-SCNC: 137 MMOL/L (ref 133–144)
WBC # BLD AUTO: 5.2 10E9/L (ref 4–11)

## 2017-04-28 PROCEDURE — 37609 LIGATION/BX TEMPORAL ARTERY: CPT | Mod: LT | Performed by: SURGERY

## 2017-04-28 PROCEDURE — 27210794 ZZH OR GENERAL SUPPLY STERILE: Performed by: SURGERY

## 2017-04-28 PROCEDURE — 25800025 ZZH RX 258: Performed by: ANESTHESIOLOGY

## 2017-04-28 PROCEDURE — 88331 PATH CONSLTJ SURG 1 BLK 1SPC: CPT | Performed by: SURGERY

## 2017-04-28 PROCEDURE — 97110 THERAPEUTIC EXERCISES: CPT | Mod: GP

## 2017-04-28 PROCEDURE — 27210995 ZZH RX 272: Performed by: SURGERY

## 2017-04-28 PROCEDURE — 25000128 H RX IP 250 OP 636: Performed by: INTERNAL MEDICINE

## 2017-04-28 PROCEDURE — 25000125 ZZHC RX 250: Performed by: NURSE ANESTHETIST, CERTIFIED REGISTERED

## 2017-04-28 PROCEDURE — 03BT0ZX EXCISION OF LEFT TEMPORAL ARTERY, OPEN APPROACH, DIAGNOSTIC: ICD-10-PCS | Performed by: SURGERY

## 2017-04-28 PROCEDURE — 88305 TISSUE EXAM BY PATHOLOGIST: CPT | Mod: 26 | Performed by: SURGERY

## 2017-04-28 PROCEDURE — 99232 SBSQ HOSP IP/OBS MODERATE 35: CPT | Performed by: INTERNAL MEDICINE

## 2017-04-28 PROCEDURE — 37000008 ZZH ANESTHESIA TECHNICAL FEE, 1ST 30 MIN: Performed by: SURGERY

## 2017-04-28 PROCEDURE — 88331 PATH CONSLTJ SURG 1 BLK 1SPC: CPT | Mod: 26 | Performed by: SURGERY

## 2017-04-28 PROCEDURE — 85027 COMPLETE CBC AUTOMATED: CPT | Performed by: INTERNAL MEDICINE

## 2017-04-28 PROCEDURE — 80048 BASIC METABOLIC PNL TOTAL CA: CPT | Performed by: INTERNAL MEDICINE

## 2017-04-28 PROCEDURE — 36000050 ZZH SURGERY LEVEL 2 1ST 30 MIN: Performed by: SURGERY

## 2017-04-28 PROCEDURE — 25000132 ZZH RX MED GY IP 250 OP 250 PS 637: Mod: GY | Performed by: INTERNAL MEDICINE

## 2017-04-28 PROCEDURE — 25000128 H RX IP 250 OP 636: Performed by: NURSE ANESTHETIST, CERTIFIED REGISTERED

## 2017-04-28 PROCEDURE — 12000007 ZZH R&B INTERMEDIATE

## 2017-04-28 PROCEDURE — 00000146 ZZHCL STATISTIC GLUCOSE BY METER IP

## 2017-04-28 PROCEDURE — 40000193 ZZH STATISTIC PT WARD VISIT

## 2017-04-28 PROCEDURE — S0020 INJECTION, BUPIVICAINE HYDRO: HCPCS | Performed by: SURGERY

## 2017-04-28 PROCEDURE — 36415 COLL VENOUS BLD VENIPUNCTURE: CPT | Performed by: INTERNAL MEDICINE

## 2017-04-28 PROCEDURE — 71000012 ZZH RECOVERY PHASE 1 LEVEL 1 FIRST HR: Performed by: SURGERY

## 2017-04-28 PROCEDURE — 37000009 ZZH ANESTHESIA TECHNICAL FEE, EACH ADDTL 15 MIN: Performed by: SURGERY

## 2017-04-28 PROCEDURE — 85652 RBC SED RATE AUTOMATED: CPT | Performed by: INTERNAL MEDICINE

## 2017-04-28 PROCEDURE — 36000052 ZZH SURGERY LEVEL 2 EA 15 ADDTL MIN: Performed by: SURGERY

## 2017-04-28 PROCEDURE — 40000170 ZZH STATISTIC PRE-PROCEDURE ASSESSMENT II: Performed by: SURGERY

## 2017-04-28 PROCEDURE — A9270 NON-COVERED ITEM OR SERVICE: HCPCS | Mod: GY | Performed by: INTERNAL MEDICINE

## 2017-04-28 PROCEDURE — 25000125 ZZHC RX 250: Performed by: SURGERY

## 2017-04-28 PROCEDURE — 97116 GAIT TRAINING THERAPY: CPT | Mod: GP

## 2017-04-28 PROCEDURE — 88305 TISSUE EXAM BY PATHOLOGIST: CPT | Performed by: SURGERY

## 2017-04-28 PROCEDURE — 25000128 H RX IP 250 OP 636: Performed by: SURGERY

## 2017-04-28 RX ORDER — FENTANYL CITRATE 50 UG/ML
25-50 INJECTION, SOLUTION INTRAMUSCULAR; INTRAVENOUS
Status: DISCONTINUED | OUTPATIENT
Start: 2017-04-28 | End: 2017-04-28 | Stop reason: HOSPADM

## 2017-04-28 RX ORDER — BUPIVACAINE HYDROCHLORIDE 5 MG/ML
INJECTION, SOLUTION PERINEURAL PRN
Status: DISCONTINUED | OUTPATIENT
Start: 2017-04-28 | End: 2017-04-28 | Stop reason: HOSPADM

## 2017-04-28 RX ORDER — MAGNESIUM HYDROXIDE 1200 MG/15ML
LIQUID ORAL PRN
Status: DISCONTINUED | OUTPATIENT
Start: 2017-04-28 | End: 2017-04-28 | Stop reason: HOSPADM

## 2017-04-28 RX ORDER — SODIUM CHLORIDE, SODIUM LACTATE, POTASSIUM CHLORIDE, CALCIUM CHLORIDE 600; 310; 30; 20 MG/100ML; MG/100ML; MG/100ML; MG/100ML
INJECTION, SOLUTION INTRAVENOUS CONTINUOUS
Status: DISCONTINUED | OUTPATIENT
Start: 2017-04-28 | End: 2017-04-28 | Stop reason: HOSPADM

## 2017-04-28 RX ORDER — ONDANSETRON 2 MG/ML
4 INJECTION INTRAMUSCULAR; INTRAVENOUS EVERY 30 MIN PRN
Status: DISCONTINUED | OUTPATIENT
Start: 2017-04-28 | End: 2017-04-28 | Stop reason: HOSPADM

## 2017-04-28 RX ORDER — HYDROMORPHONE HYDROCHLORIDE 1 MG/ML
.3-.5 INJECTION, SOLUTION INTRAMUSCULAR; INTRAVENOUS; SUBCUTANEOUS EVERY 5 MIN PRN
Status: DISCONTINUED | OUTPATIENT
Start: 2017-04-28 | End: 2017-04-28 | Stop reason: HOSPADM

## 2017-04-28 RX ORDER — PROPOFOL 10 MG/ML
INJECTION, EMULSION INTRAVENOUS CONTINUOUS PRN
Status: DISCONTINUED | OUTPATIENT
Start: 2017-04-28 | End: 2017-04-28

## 2017-04-28 RX ORDER — ONDANSETRON 4 MG/1
4 TABLET, ORALLY DISINTEGRATING ORAL EVERY 30 MIN PRN
Status: DISCONTINUED | OUTPATIENT
Start: 2017-04-28 | End: 2017-04-28 | Stop reason: HOSPADM

## 2017-04-28 RX ORDER — LIDOCAINE HYDROCHLORIDE 20 MG/ML
INJECTION, SOLUTION INFILTRATION; PERINEURAL PRN
Status: DISCONTINUED | OUTPATIENT
Start: 2017-04-28 | End: 2017-04-28

## 2017-04-28 RX ADMIN — TRAMADOL HYDROCHLORIDE 50 MG: 50 TABLET, COATED ORAL at 08:54

## 2017-04-28 RX ADMIN — SODIUM CHLORIDE 1000 ML: 0.9 IRRIGANT IRRIGATION at 15:35

## 2017-04-28 RX ADMIN — PROPOFOL 30 MCG/KG/MIN: 10 INJECTION, EMULSION INTRAVENOUS at 15:20

## 2017-04-28 RX ADMIN — TRAMADOL HYDROCHLORIDE 50 MG: 50 TABLET, COATED ORAL at 21:07

## 2017-04-28 RX ADMIN — SENNOSIDES 1 TABLET: 8.6 TABLET, FILM COATED ORAL at 08:54

## 2017-04-28 RX ADMIN — ACETAMINOPHEN 1000 MG: 500 TABLET, FILM COATED ORAL at 08:54

## 2017-04-28 RX ADMIN — DEXMEDETOMIDINE HYDROCHLORIDE 8 MCG: 100 INJECTION, SOLUTION INTRAVENOUS at 15:16

## 2017-04-28 RX ADMIN — TRAMADOL HYDROCHLORIDE 50 MG: 50 TABLET, COATED ORAL at 00:47

## 2017-04-28 RX ADMIN — PROPOFOL 50 MCG/KG/MIN: 10 INJECTION, EMULSION INTRAVENOUS at 15:07

## 2017-04-28 RX ADMIN — DEXMEDETOMIDINE HYDROCHLORIDE 8 MCG: 100 INJECTION, SOLUTION INTRAVENOUS at 15:10

## 2017-04-28 RX ADMIN — LIDOCAINE HYDROCHLORIDE 60 MG: 20 INJECTION, SOLUTION INFILTRATION; PERINEURAL at 15:07

## 2017-04-28 RX ADMIN — SODIUM CHLORIDE, POTASSIUM CHLORIDE, SODIUM LACTATE AND CALCIUM CHLORIDE: 600; 310; 30; 20 INJECTION, SOLUTION INTRAVENOUS at 15:02

## 2017-04-28 RX ADMIN — ACETAMINOPHEN 1000 MG: 500 TABLET, FILM COATED ORAL at 00:47

## 2017-04-28 RX ADMIN — ACETAMINOPHEN 1000 MG: 500 TABLET, FILM COATED ORAL at 21:07

## 2017-04-28 RX ADMIN — LIDOCAINE HYDROCHLORIDE 6 ML: 10 INJECTION, SOLUTION INFILTRATION; PERINEURAL at 15:56

## 2017-04-28 RX ADMIN — LEVOTHYROXINE SODIUM 88 MCG: 88 TABLET ORAL at 08:54

## 2017-04-28 RX ADMIN — SODIUM CHLORIDE, POTASSIUM CHLORIDE, SODIUM LACTATE AND CALCIUM CHLORIDE: 600; 310; 30; 20 INJECTION, SOLUTION INTRAVENOUS at 14:55

## 2017-04-28 RX ADMIN — DEXMEDETOMIDINE HYDROCHLORIDE 4 MCG: 100 INJECTION, SOLUTION INTRAVENOUS at 15:24

## 2017-04-28 RX ADMIN — ONDANSETRON 4 MG: 2 SOLUTION INTRAMUSCULAR; INTRAVENOUS at 08:54

## 2017-04-28 RX ADMIN — BUPIVACAINE HYDROCHLORIDE 6 ML: 5 INJECTION, SOLUTION PERINEURAL at 15:58

## 2017-04-28 ASSESSMENT — VISUAL ACUITY
OU: BASELINE
OU: BASELINE
OU: BASELINE;GLASSES
OU: BASELINE;GLASSES
OU: BLURRED VISION;BASELINE
OU: BLURRED VISION;BASELINE

## 2017-04-28 NOTE — PROGRESS NOTES
Vascular Surgery Progress Note    S:Reported temporary left vision loss ( now fine).  No headache or temporal artery tenderness.     O: Normal MRI  Vitals:  BP  Min: 94/63  Max: 164/97  Temp  Av.1  F (36.7  C)  Min: 97.8  F (36.6  C)  Max: 98.4  F (36.9  C)  I/O last 3 completed shifts:  In: 800 [P.O.:300; I.V.:500]  Out: 1175 [Urine:1175]    Physical Exam: Non-tender ST arteries.  Very [place].  + dementia               ESR=36  and 34      Assessment/Plan: ??? GCA.  We will perform left TA biopsy later today if requested by Dr Bull.  Discussed with Nursing Staff.  Very difficult for patient to comprehend.      Wm. Ella MD

## 2017-04-28 NOTE — ANESTHESIA CARE TRANSFER NOTE
Patient: Olga ARREOLA Garcia    Procedure(s):  LEFT TEMPORAL ARTERY BIOPSY. - Wound Class: I-Clean    Diagnosis: .  Diagnosis Additional Information: No value filed.    Anesthesia Type:   MAC    Note:  Airway :Room Air  Patient transferred to:PACU  Comments:   Transferred to PACU RN. Patient awake and verbal. Spontaneous resp and on room air. Monitors and alarms on. VSS. Report given.      Vitals: (Last set prior to Anesthesia Care Transfer)    CRNA VITALS  4/28/2017 1535 - 4/28/2017 1613      4/28/2017             Pulse: 87    SpO2: 99 %    Resp Rate (set): 10                Electronically Signed By: TAMMI Valdez CRNA  April 28, 2017  4:13 PM

## 2017-04-28 NOTE — PROGRESS NOTES
[facility]  Hospitalist Progress Note  Owen Salinas MD  04/28/2017    Assessment & Plan   A 89-year-old female with history of hypertension, hyperlipidemia, diet-controlled diabetes and dementia. She acutely developed left eye blurry vision earlier tonight, 2 hours prior to coming to the ED. Workup thus far is negative.       Acute left eye blurry vision (versus vision loss):  -  Etiology of patient's vision loss is unclear. CT head without contrast and CT head perfusion are negative. Will obtain MRI brain tonight. Echocardiogram with bubble study, hemoglobin A1c and fasting lipid profile will be checked.   - Neurology consulted appreciated  - Temporal Arteritis biopsy today (CRP- 73.5, ESR- 36 ),  - Vas surgery following     Hx of Hypertension with orthostatic Hypotension:  - blood pressure improved  - likely dehydrated, possible Temporal arteritis, r/o adrenal insuffiencey( given falls, hypotension, elevated CRP)  - given IV fluid bolus 500 cc, continue to hold PTA  Imdur   - cosyntropin test is normal   - has  p.r.n. labetalol and hydralazine.       Diabetes mellitus type 2:   - A1c 7.3  - Appears to be diet-controlled.   -  continue ISS low      Hyperlipidemia:   - Not on any lipid-lowering medications.   - ,       Acute renal failure:   - Likely prerenal. Patient's baseline creatinine is 0.9  - Admit creatinine is 1.26  - orthostatic this am, symtomatic, give 500 cc fluid bolus, add knee high TEDS, avoid nephrotoxins.  - creatinine 1.24 to 1.04.      Recent fall resulting in right hip and left-sided chest wall pain:  - increased pain with exertion/deep breathing  - increase scheduled PTA tramadol 25 -> 50 mg 3/day( with hold for lethergy) & scheduled Tylenol.   - also has IV Dilaudid as needed.       Deep venous thrombosis prophylaxis: Sequentials.       CODE STATUS: DNR/DNI     Interval History   - chart reviewed  - Planned temp artery biopsy today  - continues to have some  "decrease in left eye visual acuity     -Data reviewed today: I reviewed all new labs and imaging over the last 24 hours. I personally reviewed no images or EKG's today.    Physical Exam   Heart Rate: 81, Blood pressure 134/77, temperature 97.9  F (36.6  C), temperature source Oral, resp. rate 18, height 1.727 m (5' 8\"), weight 76.2 kg (167 lb 14.4 oz), SpO2 97 %.  Vitals:    04/26/17 2013   Weight: 76.2 kg (167 lb 14.4 oz)     Vital Signs with Ranges  Temp:  [97.8  F (36.6  C)-98.4  F (36.9  C)] 97.9  F (36.6  C)  Heart Rate:  [] 81  Resp:  [16-18] 18  BP: (112-164)/(55-97) 134/77  SpO2:  [91 %-98 %] 97 %  I/O's Last 24 hours  I/O last 3 completed shifts:  In: 800 [P.O.:300; I.V.:500]  Out: 1175 [Urine:1175]    Constitutional:   no apparent distress  Respiratory: Clear to auscultation bilaterally, no crackles or wheezing  Cardiovascular: Regular rate and rhythm, normal S1 and S2, and no murmur noted  GI: Normal bowel sounds, soft, non-distended, non-tender  Skin/Integumen: No rashes, no cyanosis, no edema  Other:      Medications   All medications were reviewed.    - MEDICATION INSTRUCTIONS -         traMADol (ULTRAM) tablet 50 mg  50 mg Oral TID     insulin aspart  1-3 Units Subcutaneous TID AC     insulin aspart  1-3 Units Subcutaneous At Bedtime     acetaminophen (TYLENOL) tablet 1,000 mg  1,000 mg Oral TID     aspirin chewable tablet 81 mg  81 mg Oral Daily     levothyroxine (SYNTHROID/LEVOTHROID) tablet 88 mcg  88 mcg Oral Daily        Data     Recent Labs  Lab 04/28/17  0740 04/27/17  0557 04/27/17  0040 04/26/17  1725 04/24/17  1042   WBC 5.2  --   --  6.8 7.2   HGB 9.1*  --   --  9.9* 10.6*   MCV 87  --   --  88 87     --   --  193 207     --   --  136 135   POTASSIUM 4.3  --   --  4.5 4.3   CHLORIDE 102  --   --  100 101   CO2 29  --   --  27 26   BUN 23  --   --  34* 25   CR 1.04  --   --  1.26* 0.99   ANIONGAP 6  --   --  9 8   RADHA 8.6  --   --  8.4* 8.7   *  --   --  139* 141* "   TROPI  --  0.020 0.022 <0.015The 99th percentile for upper reference range is 0.045 ug/L.  Troponin values in the range of 0.045 - 0.120 ug/L may be associated with risks of adverse clinical events. 0.019       No results found for this or any previous visit (from the past 24 hour(s)).    Owen Salinas MD  Pager [phone]

## 2017-04-28 NOTE — PLAN OF CARE
Problem: Goal Outcome Summary  Goal: Goal Outcome Summary  Outcome: Improving  Disoriented to time, situation, and place. VSS on RA. Neuros intact.  Mod carb diet. Up with 1-2 walker/GB. Impulsive. Long arm sit. Denies pain. [place], pocket talker in use. ELIUD stockings on. Continent. Pt became agitated overnight. Refused BGM check. Tele- SR with BBB.

## 2017-04-28 NOTE — PLAN OF CARE
Problem: Goal Outcome Summary  Goal: Goal Outcome Summary  Outcome: No Change  Disoriented to time, situation, and place. Neuros intact. VSS. Mod carb diet. Up with 1-2 walker/GB. Can be impulsive. Getting hallway observer NOC. Denies pain. [place], pocket talker in use. VSS on RA. ELIUD stockings applied. Plan pending.

## 2017-04-28 NOTE — PROVIDER NOTIFICATION
"Text pg to neuro: \"FYI: Dr. Jaramillo can do temporal a. dissection later today if clinically indicated. Would neuro like to proceed with procedure?\"  "

## 2017-04-28 NOTE — PLAN OF CARE
Problem: Goal Outcome Summary  Goal: Goal Outcome Summary  SLP: ST attempt for meal tolerance. RN reported no difficulty noted with current regular diet/thin liquids. Pt placed NPO for procedure for unspecified period of time. SLP will follow for 1-2 sessions of diet tolerance as appropriate.

## 2017-04-28 NOTE — PROGRESS NOTES
"[facility]  Neuroscience and Spine [place]  Neurology Daily Note      Admission Date:4/26/2017   Date of service: 04/28/2017   Hospital Day: 3      Assessment & Plan   _______________________________  #. (H54.62) Vision loss of left eye (primary encounter diagnosis)  --Highly elevated CRP, will check ESR  --Possible GCA.   --Discussed with family possible temporal artery biopsy, given severe dementia  -----Family wants to proceed with temporal artery biopsy, scheduled today  -----ESR pending  --MRI brain was unremarkable  #. (I67.1) Cerebral aneurysm, nonruptured  --Small aneurysm, should not be addressed at patient's age  #. (G30.1, F02.80) Late onset Alzheimer's disease without behavioral disturbance  --Baseline dementia  #. PT/OT/Speech  --continue evaluations  #. Nutrition  --Per speech therapy evaluation   #. DVT Prophylaxis  --per primary service    Code Status: DNR/DNI    Disposition: pending workup completion    Interval History   _______________________________  Patient presented with left eye vision loss. Had a fall 2 days prior with negative evaluation. Patient is a poor historian due to dementia. No other focal neurological deficits noted. MRI brain negative for acute abnormalities. Will get temporal artery biopsy due to possible giant cell arteritis.   Difficult to fully assess patient due to dementia. She reports dry mouth this morning. She stated \"I don't even know where I am or why I'm here\", when explained she was here because of vision troubles, she responded \"I can't hear, but I can see fine\".    Review of Systems   _______________________________  Review of systems is limited by patient factors - dementia  Physical Exam   _______________________________  Vitals: Temp: 98.4  F (36.9  C) Temp src: Oral BP: 140/77   Heart Rate: 97 Resp: 18 SpO2: 91 % O2 Device: None (Room air)    Vital Signs with Ranges: Temp:  [97.8  F (36.6  C)-98.4  F (36.9  C)] 98.4  F (36.9  C)  Heart Rate:  " [] 97  Resp:  [16-18] 18  BP: ()/(55-97) 140/77  SpO2:  [91 %-98 %] 91 %    General Appearance:  No acute distress  Neuro:       Mental Status Exam:   Awake, alert, disoriented. Speech and language are intact. Mental status is demented       Cranial Nerves:  Pupils 3 mm, reactive. EOMI. Face sensation is normal. Face is symmetric. Profoundly [place]. Tongue and uvula are midline. Other CN are normal           Motor:  5/5 X 4. Tone and bulk are normal           Reflexes:  Normal DTR. Toes equivocal.        Sensory:  Grossly intact                 Coordination:   Grossly intact       Gait:  Up with assistance  Cardiovascular: Regular rate and rhythm, no m/r/g  Lungs: Clear to auscultation  Abdomen: Soft, not tender, not distended  Extremities: No clubbing, no cyanosis, no edema    Medications   _______________________________    - MEDICATION INSTRUCTIONS -         traMADol (ULTRAM) tablet 50 mg  50 mg Oral TID     insulin aspart  1-3 Units Subcutaneous TID AC     insulin aspart  1-3 Units Subcutaneous At Bedtime     acetaminophen (TYLENOL) tablet 1,000 mg  1,000 mg Oral TID     aspirin chewable tablet 81 mg  81 mg Oral Daily     levothyroxine (SYNTHROID/LEVOTHROID) tablet 88 mcg  88 mcg Oral Daily       Data   _______________________________      Lab Data:   All data was reviewed by me personally  CBC RESULTS:  Recent Labs   Lab Test  04/28/17   0740  04/26/17   1725  04/24/17   1042   WBC  5.2  6.8  7.2   RBC  3.23*  3.46*  3.70*   HGB  9.1*  9.9*  10.6*   HCT  28.2*  30.4*  32.1*   PLT  175  193  207     Basic Metabolic Panel:  Recent Labs   Lab Test  04/28/17   0740  04/26/17   1725  04/24/17   1042   NA  137  136  135   POTASSIUM  4.3  4.5  4.3   CHLORIDE  102  100  101   CO2  29  27  26   BUN  23  34*  25   CR  1.04  1.26*  0.99   GLC  111*  139*  141*   RADHA  8.6  8.4*  8.7     Liver panel:  Recent Labs   Lab Test  02/19/12   2250   PROTTOTAL  6.5*   ALBUMIN  3.6   BILITOTAL  1.1   ALKPHOS  78   AST  35    ALT  13     Coagulation  Recent Labs   Lab Test  08/09/13   2115  02/19/12   2250   INR  1.00  1.05   PTT  30  31      Lipid Profile:  Recent Labs   Lab Test  04/27/17   0557   CHOL  187   HDL  51   LDL  108*   TRIG  140     Thyroid Panel:  Recent Labs   Lab Test  04/26/17   1725   TSH  3.75      Vitamin D level:   Recent Labs   Lab Test  04/26/17   1725   VITDT  50     A1C:   Recent Labs   Lab Test  04/26/17   1725  02/19/12   2250   A1C  7.3*  5.9     Troponin I:   Recent Labs   Lab Test  04/27/17   0557  04/27/17   0040  04/26/17   1725  04/24/17   1042   TROPI  0.020  0.022  <0.015  The 99th percentile for upper reference range is 0.045 ug/L.  Troponin values in   the range of 0.045 - 0.120 ug/L may be associated with risks of adverse   clinical events.    0.019      CRP inflammation:   Recent Labs   Lab Test  04/26/17   1725   CRP  73.5*     ESR:   Recent Labs   Lab Test  04/27/17   0557   SED  36*       UA Results:  Recent Labs   Lab Test  04/24/17   1200   COLOR  Yellow   APPEARANCE  Clear   URINEGLC  Negative   URINEBILI  Negative   URINEKETONE  Negative   SG  1.012   UBLD  Negative   URINEPH  6.0   PROTEIN  Negative   NITRITE  Negative   LEUKEST  Trace*   RBCU  1   WBCU  4*          Imaging:   All imaging studies were reviewed personally  CT head 4/26/17:   1. Atrophy and chronic white matter disease.  2. Nothing acute.  3. No interval change.  4. CT angiogram to follow.    CTA head/neck 4/26/17:   1. Nothing acute.  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    CTP head 4/26/17:  --There are no focal or regional perfusion defects in the brain.    MRI brain 4/26/17:   1. Atrophy and chronic white matter disease.  2. Nothing clearly acute.  3. Motion artifact.      Rosalinda Holland, FNP-BC

## 2017-04-28 NOTE — PLAN OF CARE
"Problem: Goal Outcome Summary  Goal: Goal Outcome Summary  PT: Pt is pleasant and cooperative, [place], motivated to participate with PT. Pt is now IP status so PT frequency updated to daily. Pt currently requires CGA for sit<>stand transfers and gait x 150' with a FWW. Pt denied dizziness in standing but did report increased fatigue and \"I need more rest.\" Pt was very distracted by wanting to drink water. Educated on NPO status. If pt returns to shelter, would recommend 24hr assist with mobility, increased assist for ADLs, use of FWW at all times, and continued HHPT/OT services. If unable to meet pt's current needs, pt would need TCU.      "

## 2017-04-28 NOTE — PROVIDER NOTIFICATION
"Text pg to neuro, \"Would you like pt to have Left temporal artery or both temporal arteries biopsied? Please let us know ASAP, thanks!\"    Addendum: call back: Left temporal artery to be biopsied  "

## 2017-04-28 NOTE — PLAN OF CARE
"Problem: Goal Outcome Summary  Goal: Goal Outcome Summary  Outcome: Improving  Pt A/O to self, forgetful-baseline dementia. VSS. Tele NSR. Neuros intact, denies any vision changes- states, \"my left eye is my bad eye but I can see.\" up w/ A*1, GB, walker. Up in chair most of shift. Mod carb diet, NPO since breakfast for procedure. [place], pocket talker in use. Voiding adequately, no BM. Down to OR for L temporal artery biopsy.      "

## 2017-04-28 NOTE — BRIEF OP NOTE
Brief Operative Note    Pre-operative diagnosis: Vision changes, rule out temporal arteritis   Post-operative diagnosis Vision changes   Procedure: Procedure(s):  LEFT TEMPORAL ARTERY BIOPSY. - Wound Class: I-Clean   Surgeon(s): Surgeon(s) and Role:     * Jem Jaramillo MD - Primary     * Adore Lainez MD - Resident - Assisting   Estimated blood loss: * No values recorded between 4/28/2017  3:29 PM and 4/28/2017  4:08 PM *    Specimens:   ID Type Source Tests Collected by Time Destination   A : LEFT TEMPORAL ARTERY BIOPSY Biopsy Artery, Temporal SURGICAL PATHOLOGY EXAM Jem Jaramillo MD 4/28/2017  3:49 PM       Findings: Left temporal artery biopsy specimen obtained, sent to path.

## 2017-04-29 ENCOUNTER — APPOINTMENT (OUTPATIENT)
Dept: SPEECH THERAPY | Facility: CLINIC | Age: 82
DRG: 115 | End: 2017-04-29
Payer: MEDICARE

## 2017-04-29 ENCOUNTER — APPOINTMENT (OUTPATIENT)
Dept: PHYSICAL THERAPY | Facility: CLINIC | Age: 82
DRG: 115 | End: 2017-04-29
Payer: MEDICARE

## 2017-04-29 LAB
GLUCOSE BLDC GLUCOMTR-MCNC: 119 MG/DL (ref 70–99)
GLUCOSE BLDC GLUCOMTR-MCNC: 120 MG/DL (ref 70–99)
GLUCOSE BLDC GLUCOMTR-MCNC: 130 MG/DL (ref 70–99)
GLUCOSE BLDC GLUCOMTR-MCNC: 150 MG/DL (ref 70–99)

## 2017-04-29 PROCEDURE — 97116 GAIT TRAINING THERAPY: CPT | Mod: GP

## 2017-04-29 PROCEDURE — 97110 THERAPEUTIC EXERCISES: CPT | Mod: GP

## 2017-04-29 PROCEDURE — 25000132 ZZH RX MED GY IP 250 OP 250 PS 637: Mod: GY | Performed by: INTERNAL MEDICINE

## 2017-04-29 PROCEDURE — 40000193 ZZH STATISTIC PT WARD VISIT

## 2017-04-29 PROCEDURE — 97530 THERAPEUTIC ACTIVITIES: CPT | Mod: GP

## 2017-04-29 PROCEDURE — 99207 ZZC CDG-MDM COMPONENT: MEETS LOW - DOWN CODED: CPT | Performed by: INTERNAL MEDICINE

## 2017-04-29 PROCEDURE — A9270 NON-COVERED ITEM OR SERVICE: HCPCS | Mod: GY | Performed by: INTERNAL MEDICINE

## 2017-04-29 PROCEDURE — 40000225 ZZH STATISTIC SLP WARD VISIT

## 2017-04-29 PROCEDURE — 00000146 ZZHCL STATISTIC GLUCOSE BY METER IP

## 2017-04-29 PROCEDURE — 12000000 ZZH R&B MED SURG/OB

## 2017-04-29 PROCEDURE — 92526 ORAL FUNCTION THERAPY: CPT | Mod: GN

## 2017-04-29 PROCEDURE — 99232 SBSQ HOSP IP/OBS MODERATE 35: CPT | Performed by: INTERNAL MEDICINE

## 2017-04-29 RX ADMIN — ASPIRIN 81 MG 81 MG: 81 TABLET ORAL at 09:30

## 2017-04-29 RX ADMIN — TRAMADOL HYDROCHLORIDE 50 MG: 50 TABLET, COATED ORAL at 21:51

## 2017-04-29 RX ADMIN — TRAMADOL HYDROCHLORIDE 50 MG: 50 TABLET, COATED ORAL at 14:42

## 2017-04-29 RX ADMIN — ACETAMINOPHEN 1000 MG: 500 TABLET, FILM COATED ORAL at 16:58

## 2017-04-29 RX ADMIN — LEVOTHYROXINE SODIUM 88 MCG: 88 TABLET ORAL at 09:30

## 2017-04-29 RX ADMIN — ACETAMINOPHEN 1000 MG: 500 TABLET, FILM COATED ORAL at 09:30

## 2017-04-29 RX ADMIN — TRAMADOL HYDROCHLORIDE 50 MG: 50 TABLET, COATED ORAL at 09:30

## 2017-04-29 RX ADMIN — ACETAMINOPHEN 1000 MG: 500 TABLET, FILM COATED ORAL at 21:51

## 2017-04-29 ASSESSMENT — VISUAL ACUITY
OU: NORMAL ACUITY
OU: GLASSES;NORMAL ACUITY
OU: BASELINE
OU: BASELINE

## 2017-04-29 NOTE — PLAN OF CARE
Problem: Goal Outcome Summary  Goal: Goal Outcome Summary  Outcome: Therapy, progress toward functional goals as expected  SLP: Pt seen for dysphagia tx while upright in bed. Pt tolerated thin liquids via cup and straw and regular solid textures with no overt s/sx of aspiration. Recommend continue regular textures and thin liquids. Pt was independently taking small sips/bites, alternating consistencies, and pacing self. Goals met. ST to sign off.      Speech Language Therapy Discharge Summary     Reason for therapy discharge:    All goals and outcomes met, no further needs identified.     Progress towards therapy goal(s). See goals on Care Plan in [facility] electronic health record for goal details.  Goals met     Therapy recommendation(s):    No further therapy is recommended.

## 2017-04-29 NOTE — PROGRESS NOTES
Vascular Surgery Progress Note    S:No complaints.  Denies left temporal incisional pain.      Reports normal vision    O:   Vitals:  BP  Min: 107/63  Max: 160/98  Temp  Av.8  F (36.6  C)  Min: 97.1  F (36.2  C)  Max: 98.4  F (36.9  C)  I/O last 3 completed shifts:  In: 1330 [P.O.:630; I.V.:700]  Out: 651 [Urine:650; Blood:1]    Physical Exam: Left incision=A       Path:  No arteritis      Assessment/Plan: Will sign off ( I called her daughter last PM with Path results)      Wm. Ella MD

## 2017-04-29 NOTE — PLAN OF CARE
Problem: Goal Outcome Summary  Goal: Goal Outcome Summary  Pt completed functional transfers with CGA and FWW and ambulated 80' x 2 with CGA and FWW.  Pt reported feeling dizzy during ambulation and needed to sit and rest.  She also reported feeling nauseated but these symptoms subsided with rest.  When pt returned to room, vitals checked and BP was 136/83, HR was 112 bpm, and O2 sats were 90%-92% on RA.  Pt is limited by fatigue, deconditioning, generalized weakness, and is [place].    If pt returns to [facility], would recommend 24hr assist with mobility, increased assist for ADLs, use of FWW at all times, and continued HHPT/OT services. If unable to meet pt's current needs, pt would need TCU.

## 2017-04-29 NOTE — PLAN OF CARE
Problem: Goal Outcome Summary  Goal: Goal Outcome Summary  Outcome: No Change  A&O x1. Dementia with sundowning. Hallway sitter. Neuros intact, denies L side vision impairment. VSS. Steri strips in place at incision site, c/d/i. Tele SR with 1st degree. Mod carb diet, tolerated well. Up with AO1 with gait belt and walker. Denies pain. Plan to discharge back to AL facility.

## 2017-04-29 NOTE — PLAN OF CARE
Problem: Goal Outcome Summary  Goal: Goal Outcome Summary  Confused. Neuros unchanged. Short term memory very poor. Denies pain. Appetite fair. Up in chair and ambulated to bathroom with walker and gait belt.

## 2017-04-29 NOTE — OP NOTE
DATE OF PROCEDURE:  04/28/2017      PREOPERATIVE DIAGNOSIS:  Temporary left eye blindness, rule out left temporal artery giant cell arteritis.      POSTOPERATIVE DIAGNOSIS:  Temporary left eye blindness, rule out left temporal artery giant cell arteritis.      PROCEDURE:  Left superficial temporal artery excisional biopsy.        SURGEON:  Jem Jaramillo MD      :   SHERI HENDERSON MD ([facility] surgery resident)      ANESTHESIA:  Local with intravenous supplementation.      PREOPERATIVE MEDICATIONS:  None.      INDICATIONS:  Olga Zelaya is a 89-year-old patient apparently had temporary blindness to her left eye that is now resolved.  Her sedimentation rate is slightly elevated.  She has no tenderness over the left temporal region.  We are requested by Neurology and the patient's daughter to perform a left temporal artery biopsy to rule out giant cell arteritis.  The risks and benefits were discussed with the patient's daughter.  We also tried to discuss this with the patient but with her dementia and hard of hearing loss it was somewhat difficult, though she did understand to some degree what the plan was.      DESCRIPTION OF PROCEDURE:  The patient was brought to the operating room.  The left temporal area and ear were prepped and draped in the usual fashion.  A timeout was called and the sites were identified.  Temporal artery pulse was easily palpable.  The area was anesthetized with 1% lidocaine with bicarbonate.  A vertical incision was made with the #15 blade scalpel.  Electrocautery was used to dissect down to the temporal vessels.  We identified a very tortuous multiply branched temporal artery.  This was entwined within the temporal vein which was able to be preserved.  We dissected this free for a length of approximately 2.5 cm.  We ligated the Y-shaped distal branches with 4-0 silk suture and the inflow main body artery with a similar 4-0 silk suture.  The segment with the 2 Y branches were  removed and sent to pathology for frozen section evaluation.      Wound was infiltrated with 0.5% Marcaine for postop analgesia.  Subcutaneous tissue was closed with interrupted 3-0 Vicryl and the skin was closed with 5-0 Monocryl in subcuticular fashion, followed by Steri-Strips dressing.      The patient tolerated the procedure well.      ESTIMATED BLOOD LOSS:  Less than 1 mL.      COMPLICATIONS:  None.         THEE LAYNE MD             D: 2017 16:08   T: 2017 01:55   MT: [id number]      Name:     BE MACEDO   MRN:      [id number]        Account:        [id number]   :      1923           Procedure Date: 2017      Document: [id number]       cc: Thee Layne MD

## 2017-04-29 NOTE — PROGRESS NOTES
[facility]  Hospitalist Progress Note  Name: Olga Zelaya    MRN: [id number]  Provider:  Teodoro Leonard MD  04/29/17    Initial presenting complaint/issue to hospital (Diagnosis): acute L eye blurry vision.         Assessment and Plan:      Summary of Stay: Olga Zelaya is a 89 year old female admitted on 4/26/2017 with history of hypertension, hyperlipidemia, diet-controlled diabetes and dementia. She acutely developed left eye blurry vision earlier tonight, 2 hours prior to coming to the ED. Workup thus far is negative.       Acute left eye blurry vision (versus vision loss):  - Etiology of patient's vision loss is unclear. CT head without contrast and CT head perfusion are negative. Will obtain MRI brain tonight. Echocardiogram with bubble study, hemoglobin A1c and fasting lipid profile will be checked.   - Neurology consulted appreciated  - s/p temporal arteritis, path pending.      Hx of Hypertension with orthostatic Hypotension:  - blood pressure improved  - likely dehydrated, possible Temporal arteritis, r/o adrenal insuffiencey( given falls, hypotension, elevated CRP)  - cosyntropin test is normal   - has p.r.n. labetalol and hydralazine.       Diabetes mellitus type 2:   - A1c 7.3  - Appears to be diet-controlled.   - continue ISS low      Hyperlipidemia:   - Not on any lipid-lowering medications.   - ,       Acute renal failure:   - Likely prerenal. Patient's baseline creatinine is 0.9  - Admit creatinine is 1.26  - Resolved.      Recent fall resulting in right hip and left-sided chest wall pain:  - increased pain with exertion/deep breathing  - increase scheduled PTA tramadol 25 -> 50 mg 3/day( with hold for lethergy) & scheduled Tylenol.   - also has IV Dilaudid as needed.       Deep venous thrombosis prophylaxis: Sequentials.       CODE STATUS: DNR/DNI        Dispo : Likely [facility] 4/30.          Interval History:        No worsening chest pain/SOB/N/V/fever/chills/visual  "changes.                  Physical Exam:      Last Vital Signs:  Temp: 98.1  F (36.7  C) Temp src: Oral BP: 140/82   Heart Rate: 84 Resp: 16 SpO2: 96 % O2 Device: None (Room air)      Intake/Output Summary (Last 24 hours) at 04/29/17 1059  Last data filed at 04/29/17 0600   Gross per 24 hour   Intake             1280 ml   Output              451 ml   Net              829 ml     I/O last 3 completed shifts:  In: 1330 [P.O.:630; I.V.:700]  Out: 651 [Urine:650; Blood:1]  Vitals:    04/26/17 2013   Weight: 76.2 kg (167 lb 14.4 oz)       Gen - Alert, awake, oriented to self in NAD.  Lungs - CTA B.  Heart - RR,S1+S2 nml, no m/g/r.  Abd - soft, NT, ND, + BS.  Ext - no edema.  Neuro - THOMAS\"s, [place].         Medications:      All current medications were reviewed.         Data:      All new lab and imaging data was reviewed.   Labs:  No results for input(s): CULT in the last 168 hours.    Recent Labs  Lab 04/28/17  0740 04/26/17  1725 04/24/17  1042   WBC 5.2 6.8 7.2   HGB 9.1* 9.9* 10.6*   HCT 28.2* 30.4* 32.1*   MCV 87 88 87    193 207       Recent Labs  Lab 04/28/17  0740 04/26/17  1725 04/24/17  1042    136 135   POTASSIUM 4.3 4.5 4.3   CHLORIDE 102 100 101   CO2 29 27 26   ANIONGAP 6 9 8   * 139* 141*   BUN 23 34* 25   CR 1.04 1.26* 0.99   GFRESTIMATED 49* 40* 52*   GFRESTBLACK 60* 48* 63   RADHA 8.6 8.4* 8.7      Recent Imaging:   No results found for this or any previous visit (from the past 24 hour(s)).    "

## 2017-04-29 NOTE — PROGRESS NOTES
"[facility]  Neuroscience and Spine [place]  Neurology Daily Note     10/11/11 8:35 AM        Assessment and Plan: 1.   Transient visual change left eye. Resolved. No headache. CRP was quite high  2. Temporal artery biopsy yesterday. Results pending. If negative would not pursue further neuro workup    Attestation:  This patient was seen and evaluated by me, Sg Lester, separately from the house staff team or resident(s).  I have reviewed the note below and agree.          Interval History:   TA bx done. Results pending           Review of Systems:   Denies visual problem or headache          Medications:     Current Facility-Administered Medications   Medication     traMADol (ULTRAM) tablet 50 mg     glucose 40 % gel 15-30 g    Or     dextrose 50 % injection 25-50 mL    Or     glucagon injection 1 mg     insulin aspart (NovoLOG) inj (RAPID ACTING)     insulin aspart (NovoLOG) inj (RAPID ACTING)     ondansetron (ZOFRAN) injection 4 mg     prochlorperazine (COMPAZINE) injection 5 mg     acetaminophen (TYLENOL) tablet 1,000 mg     aspirin chewable tablet 81 mg     levothyroxine (SYNTHROID/LEVOTHROID) tablet 88 mcg     magnesium hydroxide (MILK OF MAGNESIA) suspension 30 mL     mineral oil enema 1 enema     nitroglycerin (NITROSTAT) sublingual tablet 0.4 mg     sennosides (SENOKOT) tablet 1 tablet     traMADol (ULTRAM) half-tab 25 mg     Medication Instruction     enalaprilat (VASOTEC) injection 1.25 mg     HYDROmorphone (PF) (DILAUDID) injection 0.2 mg     naloxone (NARCAN) injection 0.1-0.4 mg             Physical Exam:   Vitals: Blood pressure 140/82, temperature 98.1  F (36.7  C), temperature source Oral, resp. rate 16, height 1.727 m (5' 8\"), weight 76.2 kg (167 lb 14.4 oz), SpO2 96 %.  General Appearance:   Alert and pleasant. Knew she is in hospital but not sure why. Face symmetric. Very [place]. Appears to see well from both eyes  .          Data:   ROUTINE IP LABS (Last 3results)  CBC " RESULTS:     Recent Labs  Lab 04/28/17  0740 04/26/17  1725 04/24/17  1042   WBC 5.2 6.8 7.2   RBC 3.23* 3.46* 3.70*   HGB 9.1* 9.9* 10.6*   HCT 28.2* 30.4* 32.1*    193 207     Basic Metabolic Panel:    Recent Labs  Lab 04/28/17  0740 04/26/17  1725 04/24/17  1042    136 135   POTASSIUM 4.3 4.5 4.3   CHLORIDE 102 100 101   CO2 29 27 26   BUN 23 34* 25   CR 1.04 1.26* 0.99   * 139* 141*   RADHA 8.6 8.4* 8.7     INR:No lab results found in last 7 days.   Lipid Profile:  Recent Labs   Lab Test  04/27/17   0557   CHOL  187   HDL  51   LDL  108*   TRIG  140     TSH:  TSH   Date Value Ref Range Status   04/26/2017 3.75 0.40 - 4.00 mU/L Final   ,   Vitamin B12: No results found for: B12   A1C:   Lab Results   Component Value Date    A1C 7.3 04/26/2017

## 2017-04-29 NOTE — PLAN OF CARE
Problem: Goal Outcome Summary  Goal: Goal Outcome Summary  A/O x 1, forgetful and [place]. Pocket talker in use. Denies pain, neuro intact. L temporal incision intact with steri strip. R hip bruised. PIV SL. Bed alarm on. Does not call. Up to BR this a.m., voided. Up with 1 assist, walker and gait belt. HTN this morning but she was experiencing situational anxiety. Will continue to observe.

## 2017-04-29 NOTE — PROGRESS NOTES
Social Work Progress Note  Pt chart reviewed. Pt discussed in interdisciplinary rounds.     Intervention: Per MD, pt to likely d/c tomorrow pending biopsy results. Chai contacted [facility]h and updated her of the above. Linh shared that she would like to arrange added [facility]. Linh would like [facility] services to help provide pt's assistance with all mobility. Chai contacted [facility] private pay nurse ([phone]) and left her a voice message. Chai also provided Linh with this contact info and emailed her additional private duty home care agencies. Linh plans to transport pt at d/c. Chai contacted [facility] [address] ([phone]) re: pt's current level of care and notification of pt's possible d/c tomorrow. Chai left them a call back number.     Team members notified: Charge RN    Plan:  Anticipated Discharge Placement: Return to [facility] [address] ([phone]).   Barriers: None identified at this time.   Follow-up plan: Cahi to continue to follow.     OUSMANE Sharp, SW  [phone]  1300

## 2017-04-30 ENCOUNTER — APPOINTMENT (OUTPATIENT)
Dept: PHYSICAL THERAPY | Facility: CLINIC | Age: 82
DRG: 115 | End: 2017-04-30
Payer: MEDICARE

## 2017-04-30 VITALS
HEIGHT: 68 IN | WEIGHT: 167.9 LBS | OXYGEN SATURATION: 95 % | TEMPERATURE: 98.2 F | SYSTOLIC BLOOD PRESSURE: 135 MMHG | BODY MASS INDEX: 25.45 KG/M2 | DIASTOLIC BLOOD PRESSURE: 83 MMHG | RESPIRATION RATE: 16 BRPM

## 2017-04-30 LAB
ANION GAP SERPL CALCULATED.3IONS-SCNC: 5 MMOL/L (ref 3–14)
BUN SERPL-MCNC: 22 MG/DL (ref 7–30)
CALCIUM SERPL-MCNC: 8.4 MG/DL (ref 8.5–10.1)
CHLORIDE SERPL-SCNC: 101 MMOL/L (ref 94–109)
CO2 SERPL-SCNC: 30 MMOL/L (ref 20–32)
CREAT SERPL-MCNC: 1.07 MG/DL (ref 0.52–1.04)
GFR SERPL CREATININE-BSD FRML MDRD: 48 ML/MIN/1.7M2
GLUCOSE BLDC GLUCOMTR-MCNC: 115 MG/DL (ref 70–99)
GLUCOSE BLDC GLUCOMTR-MCNC: 139 MG/DL (ref 70–99)
GLUCOSE BLDC GLUCOMTR-MCNC: 139 MG/DL (ref 70–99)
GLUCOSE SERPL-MCNC: 112 MG/DL (ref 70–99)
POTASSIUM SERPL-SCNC: 4.5 MMOL/L (ref 3.4–5.3)
SODIUM SERPL-SCNC: 136 MMOL/L (ref 133–144)

## 2017-04-30 PROCEDURE — 36415 COLL VENOUS BLD VENIPUNCTURE: CPT | Performed by: INTERNAL MEDICINE

## 2017-04-30 PROCEDURE — 97530 THERAPEUTIC ACTIVITIES: CPT | Mod: GP

## 2017-04-30 PROCEDURE — 80048 BASIC METABOLIC PNL TOTAL CA: CPT | Performed by: INTERNAL MEDICINE

## 2017-04-30 PROCEDURE — 25000132 ZZH RX MED GY IP 250 OP 250 PS 637: Mod: GY | Performed by: INTERNAL MEDICINE

## 2017-04-30 PROCEDURE — 97110 THERAPEUTIC EXERCISES: CPT | Mod: GP

## 2017-04-30 PROCEDURE — 00000146 ZZHCL STATISTIC GLUCOSE BY METER IP

## 2017-04-30 PROCEDURE — A9270 NON-COVERED ITEM OR SERVICE: HCPCS | Mod: GY | Performed by: INTERNAL MEDICINE

## 2017-04-30 PROCEDURE — 40000193 ZZH STATISTIC PT WARD VISIT

## 2017-04-30 PROCEDURE — 99239 HOSP IP/OBS DSCHRG MGMT >30: CPT | Performed by: INTERNAL MEDICINE

## 2017-04-30 RX ORDER — TRAMADOL HYDROCHLORIDE 50 MG/1
50 TABLET ORAL EVERY 6 HOURS PRN
Qty: 20 TABLET | Refills: 0 | Status: ON HOLD | OUTPATIENT
Start: 2017-04-30 | End: 2017-11-30

## 2017-04-30 RX ADMIN — TRAMADOL HYDROCHLORIDE 50 MG: 50 TABLET, COATED ORAL at 08:20

## 2017-04-30 RX ADMIN — LEVOTHYROXINE SODIUM 88 MCG: 88 TABLET ORAL at 08:20

## 2017-04-30 RX ADMIN — ACETAMINOPHEN 1000 MG: 500 TABLET, FILM COATED ORAL at 08:20

## 2017-04-30 RX ADMIN — SENNOSIDES 1 TABLET: 8.6 TABLET, FILM COATED ORAL at 09:44

## 2017-04-30 ASSESSMENT — VISUAL ACUITY: OU: GLASSES;NORMAL ACUITY

## 2017-04-30 NOTE — PROGRESS NOTES
Social Work Progress Note  Pt chart reviewed. Pt discussed in interdisciplinary rounds.     Intervention: Per MD, Linh would like pt to d/c to a TCU due to her not having enough physical support at her skilled nursing. Kalie spoke with Linh and she explained that she would like a referral sent to facilities within [facility] or [place]. Kalie faxed a referral to [facility] in [place], [facility], and [facility]. Sabine at [facility] informed kalie that they are able to accept pt today.     Sabine also stated that they are unable to assist pt out of the car when she arrives. However, they will bring a w/c out to pt's car.  Kalie updated Linh of the above. Linh confirmed that she is comfortable with assisting pt out of her car. [facility] will transport pt at 1300. Kalie faxed d/c orders, and PAS-R to [facility] ([phone]). Sw to fax pt's script when it is signed by MD.     PAS-RR    D: Per DHS regulation, KALIE completed and submitted PAS-RR to [place] [employer] via the [employer].  PAS-RR confirmation # is : [id number]    I: KALIE spoke with pt's daughter and they are aware a PAS-RR has been submitted.  KALIE reviewed with Linh that they may be contacted for a follow up appointment within 10 days of hospital discharge if their SNF stay is < 30 days.  Contact information for [employer] was also provided.    A: [facility] verbalized understanding.    P: Further questions may be directed to [employer] at #[phone], option #4 for PAS-RR staff.      Team members notified: Bedside RN, CC, [facility],     Plan:  Anticipated Discharge Placement: [facility]  Barriers: None identified at this time.   Follow-up plan: Sw to continue to follow.    OUSMANE Sharp, [facility]  [phone]

## 2017-04-30 NOTE — PROGRESS NOTES
Discharged to TCU, daughter providing transport. AVS reviewed with daughter. Belongings sent with pt.

## 2017-04-30 NOTE — DISCHARGE SUMMARY
[facility]  Discharge Summary        Olga Zelaya MRN# [id number]   YOB: 1923 Age: 89 year old     Date of Admission:  4/26/2017  Date of Discharge:  4/30/2017  Admitting Physician:  Juan Carlos Wheeler DO  Discharge Physician: Teodoro Leonard MD  Discharging Service: Hospitalist     Primary Provider: Joe Pelaez  Primary Care Physician Phone Number: [phone]         Discharge Diagnoses/Problem Oriented Hospital Course (Providers):    Olga Zelaya was admitted on 4/26/2017 by Juan Carlos Wheeler DO and I would refer you to their history and physical.  The following problems were addressed during her hospitalization:    Acute left eye blurry vision (versus vision loss):  - Etiology of patient's vision loss is unclear. CT head without contrast and CT head perfusion are negative. Will obtain MRI brain which showed no acute process.  - Neurology consulted appreciated  - s/p temporal artery biopsy, path pending.      Hx of Hypertension with orthostatic Hypotension:  - blood pressure improved  - likely dehydrated, possible Temporal arteritis, r/o adrenal insuffiencey( given falls, hypotension, elevated CRP)  - cosyntropin test is normal   - has p.r.n. labetalol and hydralazine.       Diabetes mellitus type 2:   - A1c 7.3  - Appears to be diet-controlled.   - continue ISS low      Hyperlipidemia:   - Not on any lipid-lowering medications.   - ,       Acute renal failure:   - Likely prerenal. Patient's baseline creatinine is 0.9  - Admit creatinine is 1.26  - Resolved.      Recent fall resulting in right hip and left-sided chest wall pain:  - increase scheduled PTA tramadol 25 -> 50 mg 3/day( with hold for lethergy) & scheduled Tylenol.   - also has IV Dilaudid as needed.     PRIMARY CARE PROVIDER: Joe Pelaez MD, [facility]       CHIEF COMPLAINT: Acute onset left eye blurry vision.       HISTORY OF PRESENT ILLNESS: Olga Zelaya is a  89-year-old female with history of dementia, hypertension, hyperlipidemia and hypothyroidism. She lives in an assisted living facility. Per daughter, the patient has been somewhat lightheaded and dizzy lately. She apparently suffered from a mechanical fall 2 days ago. She was seen at [facility] for the fall. CT head without contrast and hip/pelvis x-ray were negative for acute pathology. The patient sustained right buttocks contusion and has been having left chest wall and right hip pain since. Today the patient completed her physical therapy at the assisted living facility and was escorted back to her room when she acutely started to complain of left eye blurry vision. I am not quite clear whether it is a vision loss or blurry vision. The patient has dementia and unable to elaborate. Per patient's daughter, she thinks this blurry vision came on 2 hours prior to arrival to the ED. No other neurologic deficit. CT head without contrast, CTA of the head and neck and CT head with contrast were all negative for acute pathology. The patient's left eye symptom persisted. Admit for further work up of stroke.     Patient was admitted as an inpatient due to concern for possible acute stroke.  She was seen by neurology and an MRI was performed which did not show any acute process in the brain.  Her inflammatory markers were elevated and hence it was thought that she may have possible giant cell arteritis.  This was discussed with the family who opted for a biopsy.  The preliminary pathology shows no giant cell arteritis.  She was not treated with steroids over here.  She will need follow-up for the official pathology reports at the TCU.         Code Status:      DNR / DNI         Important Results:      MR brain, pending path results of temporal artery.         Pending Results:        Unresulted Labs Ordered in the Past 30 Days of this Admission     Date and Time Order Name Status Description    4/28/2017 1554  Surgical pathology exam In process                Discharge Instructions and Follow-Up:      Follow-up Appointments     Follow Up and recommended labs and tests       Follow up with long-term physician.  The following labs/tests are   recommended: f/u of pathology of temporal arterities.                         Discharge Disposition:      Discharged to rehabilitation facility         Discharge Medications:        Current Discharge Medication List      CONTINUE these medications which have CHANGED    Details   traMADol (ULTRAM) 50 MG tablet Take 1 tablet (50 mg) by mouth every 6 hours as needed for pain maximum 4 tablet(s) per day  Qty: 20 tablet, Refills: 0    Associated Diagnoses: Hip pain, right         CONTINUE these medications which have NOT CHANGED    Details   ASPIRIN PO Take 81 mg by mouth daily      NITROGLYCERIN SL Place 0.4 mg under the tongue every 5 minutes as needed for chest pain      LEVOTHYROXINE SODIUM PO Take 88 mcg by mouth daily      ASCORBIC ACID PO Take 1,000 mg by mouth daily      ACETAMINOPHEN PO Take 1,000 mg by mouth 3 times daily      ISOSORBIDE MONONITRATE PO Take 5 mg by mouth 2 times daily 1/2 of 10mg tablet      mineral oil enema Place 1 enema rectally daily as needed for constipation      magnesium hydroxide (MILK OF MAGNESIA) 400 MG/5ML suspension Take 30 mLs by mouth daily as needed for constipation or heartburn      sennosides (SENOKOT) 8.6 MG tablet Take 1 tablet by mouth daily as needed for constipation      cholecalciferol (VITAMIN D) 1000 UNIT tablet Take 1 tablet by mouth daily.                  Allergies:         Allergies   Allergen Reactions     Biaxin [Clarithromycin]      Codeine      Nabumetone      Penicillins      Seafood      Sulfa Drugs             Consultations This Hospital Stay:      Consultation during this admission received from neurology         Condition and Physical Exam on Discharge:      Discharge condition: Stable   Discharge vitals: Blood pressure  "142/75, temperature 98.2  F (36.8  C), temperature source Oral, resp. rate 16, height 1.727 m (5' 8\"), weight 76.2 kg (167 lb 14.4 oz), SpO2 92 %.     Gen - Alert, awake, oriented to self in NAD.  Lungs - CTA B.  Heart - RR,S1+S2 nml, no m/g/r.  Abd - soft, NT, ND, + BS.  Ext - no edema.  Neuro - Rebecca, [place]. Ecchymosis RLE.           Discharge Orders for Skilled Facility (from Discharge Orders):        After Care Instructions     Activity - Up with nursing assistance           Advance Diet as Tolerated       Follow this diet upon discharge: Orders Placed This Encounter      Room Service      Consistent Carbohydrate Diet 6948-2883 Calories: Moderate Consistent CHO (4-6 CHO units/meal)            Fall precautions           General info for SNF       Length of Stay Estimate: Short Term Care: Estimated # of Days <30  Condition at Discharge: Stable  Level of care:skilled   Rehabilitation Potential: Fair  Admission H&P remains valid and up-to-date: Yes  Recent Chemotherapy: N/A  Use Nursing Home Standing Orders: Yes            Mantoux instructions       Give two-step Mantoux (PPD) Per Facility Policy Yes                           Rehab orders for Skilled Facility (from Discharge Orders):      Referrals     Future Labs/Procedures    Occupational Therapy Adult Consult     Comments:    Evaluate and treat as clinically indicated.    Reason: weakness    Physical Therapy Adult Consult     Comments:    Evaluate and treat as clinically indicated.    Reason:  weakness             Discharge Time:      Greater than 30 minutes.        Image Results From This Hospital Stay (For Non-EPIC Providers):        Results for orders placed or performed during the hospital encounter of 04/26/17   Head CT w/o contrast    Narrative    CT HEAD W/O CONTRAST  4/26/2017 6:03 PM    HISTORY: Fell. Blurred vision left eye.    TECHNIQUE: Head CT without contrast.  Radiation dose for this scan was  reduced using automated exposure control, adjustment of " the mA and/or  kV according to patient size, or iterative reconstruction technique.    COMPARISON: 04/24/2017.    FINDINGS: Atrophy and chronic white matter disease.  No hemorrhage,  mass lesion, or focal area of acute infarction identified. Paranasal  sinuses are normal. No bony abnormality.      Impression    IMPRESSION:   1. Atrophy and chronic white matter disease.  2. Nothing acute.  3. No interval change.  4. CT angiogram to follow.    SANDY DIAS MD   Chest XR,  PA & LAT    Narrative    CHEST TWO VIEWS  4/26/2017 6:18 PM     HISTORY: Fall, right-sided chest pain.      Impression    IMPRESSION: The lungs appear grossly clear and probably unchanged,  given differences in projection, when compared to 11/21/2014. Aortic  arch calcification is noted.     NELI BRAR MD   CT Head w Contrast    Narrative    CT BRAIN PERFUSION 4/26/2017 6:23 PM    HISTORY: Fall, loss of vision.    TECHNIQUE: Time sequential axial CT images of the head were acquired  during the administration of intravenous contrast 47 mL Isovue-370.  CTA images of the [place] as well as color perfusion maps of  the brain were created from this time sequential axial source data.  Radiation dose for this scan was reduced using automated exposure  control, adjustment of the mA and/or kV according to patient size, or  iterative reconstruction technique.    COMPARISON: Head CT today.    FINDINGS: There are no focal or regional perfusion defects in the  brain.      Impression    IMPRESSION: Normal CT perfusion of the brain.    SANDY DIAS MD   CTA Angiogram Head Neck    Narrative    CT ANGIOGRAM OF THE HEAD AND NECK WITHOUT AND WITH CONTRAST  4/26/2017  6:12 PM     HISTORY: Fell. Loss of vision.    TECHNIQUE:  Precontrast localizing scans were followed by CT  angiography with an injection of 70 mL nonionic contrast material IV  with scans through the head and neck.  Images were transferred to a  separate 3-D workstation where  multiplanar reformations and 3-D images  were created.  Estimates of carotid stenoses are made relative to the  distal internal carotid artery diameters except as noted.   Radiation  dose for this scan was reduced using automated exposure control,  adjustment of the mA and/or kV according to patient size, or iterative  reconstruction technique.    COMPARISON: Head CT today.    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 Remainder of the [place] is normal.    There is some calcified plaque at the carotid bifurcations but no  measurable stenosis on either side. There is antegrade flow in both  vertebral arteries. No evidence for dissection. Origins of the great  vessels off the aortic arch are intact.      Impression    IMPRESSION:  1. Nothing acute.  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  3. Findings discussed by phone with Dr. Canseco.      SANDY DIAS MD   MR Brain w/o Contrast    Narrative    MR BRAIN WITHOUT CONTRAST 4/26/2017 10:38 PM    HISTORY: Acute left eye blurry vision versus vision loss.    COMPARISON: CT perfusion scan and CT angiogram today.    TECHNIQUE: Noncontrast stroke protocol MR brain.    FINDINGS: Motion artifact. Atrophy. No convincing diffusion  abnormality and, therefore, no evidence for recent infarct. There are  some T2 shine through present, for example on series 602 image 13.  There is mild patchy T2 hyperintensities in the white matter of each  cerebral hemisphere, consistent with chronic small vessel ischemic  disease. Minimal such changes are present in the [place]. No intracranial  hemorrhage or mass.    Sinuses are clear.      Impression    IMPRESSION:  1. Atrophy and chronic white matter disease.  2. Nothing clearly acute.  3. Motion artifact.    SANDY DIAS MD           Most Recent Lab Results In EPIC (For Non-EPIC Providers):    Most Recent 3 CBC's:  Recent Labs   Lab Test  04/28/17   0740   04/26/17   1725  04/24/17   1042   WBC  5.2  6.8  7.2   HGB  9.1*  9.9*  10.6*   MCV  87  88  87   PLT  175  193  207      Most Recent 3 BMP's:  Recent Labs   Lab Test  04/30/17   0701  04/28/17   0740  04/26/17   1725   NA  136  137  136   POTASSIUM  4.5  4.3  4.5   CHLORIDE  101  102  100   CO2  30  29  27   BUN  22  23  34*   CR  1.07*  1.04  1.26*   ANIONGAP  5  6  9   RADHA  8.4*  8.6  8.4*   GLC  112*  111*  139*     Most Recent 3 Troponin's:  Recent Labs   Lab Test  04/27/17   0557  04/27/17   0040  04/26/17   1725   TROPI  0.020  0.022  <0.015  The 99th percentile for upper reference range is 0.045 ug/L.  Troponin values in   the range of 0.045 - 0.120 ug/L may be associated with risks of adverse   clinical events.       Most Recent 3 INR's:  Recent Labs   Lab Test  08/09/13   2115  02/19/12   2250   INR  1.00  1.05     Most Recent 2 LFT's:  Recent Labs   Lab Test  02/19/12   2250   AST  35   ALT  13   ALKPHOS  78   BILITOTAL  1.1     Most Recent Cholesterol Panel:  Recent Labs   Lab Test  04/27/17   0557   CHOL  187   LDL  108*   HDL  51   TRIG  140     Most Recent 6 Bacteria Isolates From Any Culture (See EPIC Reports for Culture Details):No lab results found.  Most Recent TSH, T4 and HgbA1c:  Recent Labs   Lab Test  04/26/17   1725   TSH  3.75   A1C  7.3*

## 2017-05-01 LAB — COPATH REPORT: NORMAL

## 2017-05-01 NOTE — PLAN OF CARE
Problem: Goal Outcome Summary  Goal: Goal Outcome Summary  Physical Therapy Discharge Summary     Reason for therapy discharge:    Discharged to transitional care facility.     Progress towards therapy goal(s). See goals on Care Plan in [facility] electronic health record for goal details.  Goals not met.  Barriers to achieving goals:   discharge from facility.     Therapy recommendation(s):    Continued therapy is recommended.  Rationale/Recommendations:  Pt will benefit from continued PT at TCU.

## 2017-05-02 ENCOUNTER — OFFICE VISIT - HEALTHEAST (OUTPATIENT)
Dept: GERIATRICS | Facility: CLINIC | Age: 82
End: 2017-05-02

## 2017-05-02 ENCOUNTER — AMBULATORY - HEALTHEAST (OUTPATIENT)
Dept: ADMINISTRATIVE | Facility: CLINIC | Age: 82
End: 2017-05-02

## 2017-05-02 DIAGNOSIS — E03.9 HYPOTHYROIDISM: ICD-10-CM

## 2017-05-02 DIAGNOSIS — F03.90 DEMENTIA (H): ICD-10-CM

## 2017-05-02 DIAGNOSIS — I25.10 CAD (CORONARY ARTERY DISEASE): ICD-10-CM

## 2017-05-04 ENCOUNTER — OFFICE VISIT - HEALTHEAST (OUTPATIENT)
Dept: GERIATRICS | Facility: CLINIC | Age: 82
End: 2017-05-04

## 2017-05-04 DIAGNOSIS — R07.89 CHEST WALL PAIN: ICD-10-CM

## 2017-05-04 DIAGNOSIS — I25.10 CAD (CORONARY ARTERY DISEASE): ICD-10-CM

## 2017-05-04 DIAGNOSIS — F03.90 DEMENTIA (H): ICD-10-CM

## 2017-05-09 ENCOUNTER — OFFICE VISIT - HEALTHEAST (OUTPATIENT)
Dept: GERIATRICS | Facility: CLINIC | Age: 82
End: 2017-05-09

## 2017-05-09 DIAGNOSIS — Z91.81 HISTORY OF FALLING: ICD-10-CM

## 2017-05-09 DIAGNOSIS — I95.1 ORTHOSTASIS: ICD-10-CM

## 2017-05-09 DIAGNOSIS — F03.90 DEMENTIA (H): ICD-10-CM

## 2017-05-09 DIAGNOSIS — R07.89 CHEST WALL PAIN: ICD-10-CM

## 2017-05-09 DIAGNOSIS — M25.551 RIGHT HIP PAIN: ICD-10-CM

## 2017-05-11 ENCOUNTER — OFFICE VISIT - HEALTHEAST (OUTPATIENT)
Dept: GERIATRICS | Facility: CLINIC | Age: 82
End: 2017-05-11

## 2017-05-11 DIAGNOSIS — R07.89 CHEST WALL PAIN: ICD-10-CM

## 2017-05-11 DIAGNOSIS — Z91.81 HISTORY OF FALLING: ICD-10-CM

## 2017-05-11 DIAGNOSIS — F03.90 DEMENTIA (H): ICD-10-CM

## 2017-05-11 DIAGNOSIS — I95.1 ORTHOSTASIS: ICD-10-CM

## 2017-05-15 NOTE — ANESTHESIA POSTPROCEDURE EVALUATION
Patient: Olga Zelaya    Procedure(s):  LEFT TEMPORAL ARTERY BIOPSY. - Wound Class: I-Clean    Diagnosis:.  Diagnosis Additional Information: No value filed.    Anesthesia Type:  MAC  Note:  Anesthesia Post Evaluation    Patient location during evaluation: bedside  Patient participation: Able to fully participate in evaluation  Level of consciousness: awake  Pain management: adequate  Airway patency: patent  Cardiovascular status: acceptable  Respiratory status: acceptable  Hydration status: acceptable  PONV: none     Anesthetic complications: None          Last vitals:  Vitals:    04/29/17 2151 04/30/17 0755 04/30/17 1154   BP:  142/75 135/83   Resp: 18 16 16   Temp:  36.8  C (98.2  F)    SpO2:  92% 95%         Electronically Signed By: Jacob Dickerson DO, DO  May 15, 2017  12:55 PM

## 2017-05-16 ENCOUNTER — OFFICE VISIT - HEALTHEAST (OUTPATIENT)
Dept: GERIATRICS | Facility: CLINIC | Age: 82
End: 2017-05-16

## 2017-05-16 DIAGNOSIS — R53.81 PHYSICAL DECONDITIONING: ICD-10-CM

## 2017-05-16 DIAGNOSIS — M25.551 RIGHT HIP PAIN: ICD-10-CM

## 2017-05-16 DIAGNOSIS — R07.89 CHEST WALL PAIN: ICD-10-CM

## 2017-05-16 DIAGNOSIS — H91.90 HEARING LOSS: ICD-10-CM

## 2017-05-16 DIAGNOSIS — F03.90 DEMENTIA (H): ICD-10-CM

## 2017-05-23 ENCOUNTER — OFFICE VISIT - HEALTHEAST (OUTPATIENT)
Dept: GERIATRICS | Facility: CLINIC | Age: 82
End: 2017-05-23

## 2017-05-23 DIAGNOSIS — E11.9 DIABETES MELLITUS (H): ICD-10-CM

## 2017-05-23 DIAGNOSIS — F03.90 DEMENTIA (H): ICD-10-CM

## 2017-05-23 DIAGNOSIS — Z91.81 HISTORY OF FALLING: ICD-10-CM

## 2017-05-23 DIAGNOSIS — I95.1 ORTHOSTATIC HYPOTENSION: ICD-10-CM

## 2017-05-23 DIAGNOSIS — R53.81 PHYSICAL DECONDITIONING: ICD-10-CM

## 2017-05-23 DIAGNOSIS — H91.90 HEARING LOSS: ICD-10-CM

## 2017-05-25 ENCOUNTER — OFFICE VISIT - HEALTHEAST (OUTPATIENT)
Dept: GERIATRICS | Facility: CLINIC | Age: 82
End: 2017-05-25

## 2017-05-25 DIAGNOSIS — R07.89 CHEST WALL PAIN: ICD-10-CM

## 2017-05-25 DIAGNOSIS — F03.90 DEMENTIA (H): ICD-10-CM

## 2017-05-25 DIAGNOSIS — Z91.81 HISTORY OF FALLING: ICD-10-CM

## 2017-06-02 ENCOUNTER — AMBULATORY - HEALTHEAST (OUTPATIENT)
Dept: GERIATRICS | Facility: CLINIC | Age: 82
End: 2017-06-02

## 2017-08-04 ENCOUNTER — DOCUMENTATION ONLY (OUTPATIENT)
Dept: OTHER | Facility: CLINIC | Age: 82
End: 2017-08-04

## 2017-08-04 PROBLEM — Z71.89 ADVANCED DIRECTIVES, COUNSELING/DISCUSSION: Chronic | Status: ACTIVE | Noted: 2017-08-04

## 2017-11-29 ENCOUNTER — APPOINTMENT (OUTPATIENT)
Dept: CT IMAGING | Facility: CLINIC | Age: 82
End: 2017-11-29
Attending: EMERGENCY MEDICINE
Payer: MEDICARE

## 2017-11-29 ENCOUNTER — HOSPITAL ENCOUNTER (EMERGENCY)
Facility: CLINIC | Age: 82
Discharge: OTHER INSTITUTION | End: 2017-11-29
Attending: EMERGENCY MEDICINE | Admitting: EMERGENCY MEDICINE
Payer: MEDICARE

## 2017-11-29 ENCOUNTER — APPOINTMENT (OUTPATIENT)
Dept: GENERAL RADIOLOGY | Facility: CLINIC | Age: 82
End: 2017-11-29
Attending: EMERGENCY MEDICINE
Payer: MEDICARE

## 2017-11-29 ENCOUNTER — APPOINTMENT (OUTPATIENT)
Dept: CT IMAGING | Facility: CLINIC | Age: 82
End: 2017-11-29
Attending: FAMILY MEDICINE
Payer: MEDICARE

## 2017-11-29 ENCOUNTER — HOSPITAL ENCOUNTER (OUTPATIENT)
Facility: CLINIC | Age: 82
Setting detail: OBSERVATION
Discharge: NURSING, REHAB OR LONG-TERM CARE | End: 2017-12-01
Attending: FAMILY MEDICINE | Admitting: SURGERY
Payer: MEDICARE

## 2017-11-29 VITALS
DIASTOLIC BLOOD PRESSURE: 119 MMHG | TEMPERATURE: 97.8 F | RESPIRATION RATE: 22 BRPM | SYSTOLIC BLOOD PRESSURE: 164 MMHG | OXYGEN SATURATION: 98 %

## 2017-11-29 DIAGNOSIS — S02.85XB: ICD-10-CM

## 2017-11-29 DIAGNOSIS — S01.81XA FACIAL LACERATION, INITIAL ENCOUNTER: ICD-10-CM
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
## 2017-11-29 DIAGNOSIS — S22.069S CLOSED FRACTURE OF SEVENTH THORACIC VERTEBRA, UNSPECIFIED FRACTURE MORPHOLOGY, SEQUELA: ICD-10-CM

## 2017-11-29 DIAGNOSIS — S00.81XA ABRASION OF FACE, INITIAL ENCOUNTER: ICD-10-CM

## 2017-11-29 DIAGNOSIS — S02.85XA CLOSED FRACTURE OF ORBIT, INITIAL ENCOUNTER (H): ICD-10-CM

## 2017-11-29 DIAGNOSIS — S02.85XA CLOSED FRACTURE OF RIGHT ORBIT, INITIAL ENCOUNTER (H): ICD-10-CM

## 2017-11-29 DIAGNOSIS — H91.93 BILATERAL DEAFNESS: ICD-10-CM

## 2017-11-29 DIAGNOSIS — S22.069A CLOSED FRACTURE OF SEVENTH THORACIC VERTEBRA, UNSPECIFIED FRACTURE MORPHOLOGY, INITIAL ENCOUNTER (H): ICD-10-CM
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
## 2017-11-29 DIAGNOSIS — S02.19XA CLOSED FRACTURE OF FRONTAL SINUS, INITIAL ENCOUNTER (H): ICD-10-CM

## 2017-11-29 DIAGNOSIS — H91.8X3 OTHER SPECIFIED HEARING LOSS OF BOTH EARS: ICD-10-CM

## 2017-11-29 LAB
ANION GAP SERPL CALCULATED.3IONS-SCNC: 9 MMOL/L (ref 3–14)
APTT PPP: 33 SEC (ref 22–37)
BASOPHILS # BLD AUTO: 0 10E9/L (ref 0–0.2)
BASOPHILS # BLD AUTO: 0.1 10E9/L (ref 0–0.2)
BASOPHILS NFR BLD AUTO: 0.3 %
BASOPHILS NFR BLD AUTO: 0.8 %
BUN SERPL-MCNC: 20 MG/DL (ref 7–30)
CALCIUM SERPL-MCNC: 9.6 MG/DL (ref 8.5–10.1)
CHLORIDE SERPL-SCNC: 98 MMOL/L (ref 94–109)
CO2 SERPL-SCNC: 27 MMOL/L (ref 20–32)
CREAT SERPL-MCNC: 0.97 MG/DL (ref 0.52–1.04)
DIFFERENTIAL METHOD BLD: NORMAL
DIFFERENTIAL METHOD BLD: NORMAL
EOSINOPHIL # BLD AUTO: 0.1 10E9/L (ref 0–0.7)
EOSINOPHIL # BLD AUTO: 0.2 10E9/L (ref 0–0.7)
EOSINOPHIL NFR BLD AUTO: 0.9 %
EOSINOPHIL NFR BLD AUTO: 2.9 %
ERYTHROCYTE [DISTWIDTH] IN BLOOD BY AUTOMATED COUNT: 12.8 % (ref 10–15)
ERYTHROCYTE [DISTWIDTH] IN BLOOD BY AUTOMATED COUNT: 13.1 % (ref 10–15)
GFR SERPL CREATININE-BSD FRML MDRD: 53 ML/MIN/1.7M2
GLUCOSE SERPL-MCNC: 171 MG/DL (ref 70–99)
HCT VFR BLD AUTO: 38.5 % (ref 35–47)
HCT VFR BLD AUTO: 38.8 % (ref 35–47)
HGB BLD-MCNC: 12.4 G/DL (ref 11.7–15.7)
HGB BLD-MCNC: 12.6 G/DL (ref 11.7–15.7)
IMM GRANULOCYTES # BLD: 0.1 10E9/L (ref 0–0.4)
IMM GRANULOCYTES # BLD: 0.1 10E9/L (ref 0–0.4)
IMM GRANULOCYTES NFR BLD: 0.6 %
IMM GRANULOCYTES NFR BLD: 1.9 %
INR PPP: 1.04 (ref 0.86–1.14)
INTERPRETATION ECG - MUSE: NORMAL
LYMPHOCYTES # BLD AUTO: 1.3 10E9/L (ref 0.8–5.3)
LYMPHOCYTES # BLD AUTO: 1.9 10E9/L (ref 0.8–5.3)
LYMPHOCYTES NFR BLD AUTO: 20.6 %
LYMPHOCYTES NFR BLD AUTO: 21.1 %
MCH RBC QN AUTO: 30.5 PG (ref 26.5–33)
MCH RBC QN AUTO: 30.5 PG (ref 26.5–33)
MCHC RBC AUTO-ENTMCNC: 32.2 G/DL (ref 31.5–36.5)
MCHC RBC AUTO-ENTMCNC: 32.5 G/DL (ref 31.5–36.5)
MCV RBC AUTO: 94 FL (ref 78–100)
MCV RBC AUTO: 95 FL (ref 78–100)
MONOCYTES # BLD AUTO: 0.3 10E9/L (ref 0–1.3)
MONOCYTES # BLD AUTO: 0.4 10E9/L (ref 0–1.3)
MONOCYTES NFR BLD AUTO: 4.7 %
MONOCYTES NFR BLD AUTO: 4.8 %
NEUTROPHILS # BLD AUTO: 4.5 10E9/L (ref 1.6–8.3)
NEUTROPHILS # BLD AUTO: 6.3 10E9/L (ref 1.6–8.3)
NEUTROPHILS NFR BLD AUTO: 69.1 %
NEUTROPHILS NFR BLD AUTO: 72.3 %
NRBC # BLD AUTO: 0 10*3/UL
NRBC # BLD AUTO: 0 10*3/UL
NRBC BLD AUTO-RTO: 0 /100
NRBC BLD AUTO-RTO: 0 /100
PLATELET # BLD AUTO: 313 10E9/L (ref 150–450)
PLATELET # BLD AUTO: 367 10E9/L (ref 150–450)
POTASSIUM SERPL-SCNC: 4.1 MMOL/L (ref 3.4–5.3)
RBC # BLD AUTO: 4.06 10E12/L (ref 3.8–5.2)
RBC # BLD AUTO: 4.13 10E12/L (ref 3.8–5.2)
SODIUM SERPL-SCNC: 134 MMOL/L (ref 133–144)
TROPONIN I SERPL-MCNC: 0.02 UG/L (ref 0–0.04)
WBC # BLD AUTO: 6.5 10E9/L (ref 4–11)
WBC # BLD AUTO: 8.8 10E9/L (ref 4–11)

## 2017-11-29 PROCEDURE — 99285 EMERGENCY DEPT VISIT HI MDM: CPT | Mod: 25,27 | Performed by: FAMILY MEDICINE

## 2017-11-29 PROCEDURE — 72125 CT NECK SPINE W/O DYE: CPT

## 2017-11-29 PROCEDURE — 71260 CT THORAX DX C+: CPT

## 2017-11-29 PROCEDURE — 99285 EMERGENCY DEPT VISIT HI MDM: CPT | Mod: 25

## 2017-11-29 PROCEDURE — 85025 COMPLETE CBC W/AUTO DIFF WBC: CPT | Performed by: FAMILY MEDICINE

## 2017-11-29 PROCEDURE — 84484 ASSAY OF TROPONIN QUANT: CPT | Performed by: FAMILY MEDICINE

## 2017-11-29 PROCEDURE — 70486 CT MAXILLOFACIAL W/O DYE: CPT

## 2017-11-29 PROCEDURE — 85025 COMPLETE CBC W/AUTO DIFF WBC: CPT | Performed by: EMERGENCY MEDICINE

## 2017-11-29 PROCEDURE — 25000125 ZZHC RX 250: Performed by: RADIOLOGY

## 2017-11-29 PROCEDURE — 96374 THER/PROPH/DIAG INJ IV PUSH: CPT

## 2017-11-29 PROCEDURE — 12011 RPR F/E/E/N/L/M 2.5 CM/<: CPT

## 2017-11-29 PROCEDURE — 25000128 H RX IP 250 OP 636: Performed by: RADIOLOGY

## 2017-11-29 PROCEDURE — 74177 CT ABD & PELVIS W/CONTRAST: CPT

## 2017-11-29 PROCEDURE — 96374 THER/PROPH/DIAG INJ IV PUSH: CPT | Performed by: FAMILY MEDICINE

## 2017-11-29 PROCEDURE — 27210282 ZZH ADHESIVE DERMABOND SKIN

## 2017-11-29 PROCEDURE — 85730 THROMBOPLASTIN TIME PARTIAL: CPT | Performed by: FAMILY MEDICINE

## 2017-11-29 PROCEDURE — 25000128 H RX IP 250 OP 636: Performed by: FAMILY MEDICINE

## 2017-11-29 PROCEDURE — 96375 TX/PRO/DX INJ NEW DRUG ADDON: CPT | Performed by: FAMILY MEDICINE

## 2017-11-29 PROCEDURE — 73030 X-RAY EXAM OF SHOULDER: CPT | Mod: RT

## 2017-11-29 PROCEDURE — 93005 ELECTROCARDIOGRAM TRACING: CPT | Mod: XU

## 2017-11-29 PROCEDURE — 85610 PROTHROMBIN TIME: CPT | Performed by: FAMILY MEDICINE

## 2017-11-29 PROCEDURE — 25000128 H RX IP 250 OP 636: Performed by: EMERGENCY MEDICINE

## 2017-11-29 PROCEDURE — 80048 BASIC METABOLIC PNL TOTAL CA: CPT | Performed by: EMERGENCY MEDICINE

## 2017-11-29 PROCEDURE — 68200002 ZZH TRAUMA EVALUATION W/O CC LEVEL II: Performed by: FAMILY MEDICINE

## 2017-11-29 PROCEDURE — 70450 CT HEAD/BRAIN W/O DYE: CPT

## 2017-11-29 PROCEDURE — 99207 ZZC APP CREDIT; MD BILLING SHARED VISIT: CPT | Mod: Z6 | Performed by: FAMILY MEDICINE

## 2017-11-29 RX ORDER — IOPAMIDOL 755 MG/ML
130 INJECTION, SOLUTION INTRAVASCULAR ONCE
Status: COMPLETED | OUTPATIENT
Start: 2017-11-29 | End: 2017-11-29

## 2017-11-29 RX ORDER — ONDANSETRON 2 MG/ML
4 INJECTION INTRAMUSCULAR; INTRAVENOUS ONCE
Status: COMPLETED | OUTPATIENT
Start: 2017-11-29 | End: 2017-11-29

## 2017-11-29 RX ORDER — HYDRALAZINE HYDROCHLORIDE 20 MG/ML
10 INJECTION INTRAMUSCULAR; INTRAVENOUS ONCE
Status: DISCONTINUED | OUTPATIENT
Start: 2017-11-29 | End: 2017-11-29

## 2017-11-29 RX ORDER — FENTANYL CITRATE 50 UG/ML
12.5 INJECTION, SOLUTION INTRAMUSCULAR; INTRAVENOUS ONCE
Status: COMPLETED | OUTPATIENT
Start: 2017-11-29 | End: 2017-11-29

## 2017-11-29 RX ORDER — SODIUM CHLORIDE 9 MG/ML
INJECTION, SOLUTION INTRAVENOUS ONCE
Status: COMPLETED | OUTPATIENT
Start: 2017-11-29 | End: 2017-11-29

## 2017-11-29 RX ORDER — HYDRALAZINE HYDROCHLORIDE 20 MG/ML
10 INJECTION INTRAMUSCULAR; INTRAVENOUS ONCE
Status: COMPLETED | OUTPATIENT
Start: 2017-11-29 | End: 2017-11-29

## 2017-11-29 RX ADMIN — SODIUM CHLORIDE: 9 INJECTION, SOLUTION INTRAVENOUS at 21:43

## 2017-11-29 RX ADMIN — IOPAMIDOL 130 ML: 755 INJECTION, SOLUTION INTRAVENOUS at 22:09

## 2017-11-29 RX ADMIN — SODIUM CHLORIDE, PRESERVATIVE FREE 76 ML: 5 INJECTION INTRAVENOUS at 22:13

## 2017-11-29 RX ADMIN — SODIUM CHLORIDE 500 ML: 9 INJECTION, SOLUTION INTRAVENOUS at 16:47

## 2017-11-29 RX ADMIN — ONDANSETRON 4 MG: 2 INJECTION INTRAMUSCULAR; INTRAVENOUS at 20:25

## 2017-11-29 RX ADMIN — FENTANYL CITRATE 12.5 MCG: 50 INJECTION, SOLUTION INTRAMUSCULAR; INTRAVENOUS at 21:40

## 2017-11-29 RX ADMIN — HYDRALAZINE HYDROCHLORIDE 10 MG: 20 INJECTION INTRAMUSCULAR; INTRAVENOUS at 18:14

## 2017-11-29 ASSESSMENT — ENCOUNTER SYMPTOMS
WOUND: 1
HEADACHES: 0
FACIAL SWELLING: 1
HEADACHES: 1
CONFUSION: 0
NECK STIFFNESS: 0
ARTHRALGIAS: 0
SHORTNESS OF BREATH: 0
FEVER: 0
ABDOMINAL PAIN: 0
NECK PAIN: 0
COLOR CHANGE: 1
DIFFICULTY URINATING: 0
EYE REDNESS: 0
NECK PAIN: 0

## 2017-11-29 NOTE — ED NOTES
Unwitnessed fall at nursing home. Pt called for help. EMS called. No LOC reported. Lac to right cheek and right black eye.  C/O pain to neck when lifting right arm. A&Ox4

## 2017-11-29 NOTE — IP AVS SNAPSHOT
MRN:[id number]                      After Visit Summary   11/29/2017    Olga Zelaya    MRN: [id number]           Thank you!     Thank you for choosing [place] [facility] for your care. Our goal is always to provide you with excellent care. Hearing back from our patients is one way we can continue to improve our services. Please take a few minutes to complete the written survey that you may receive in the mail after you visit with us. Thank you!        Patient Information     Date Of Birth          8/23/1923        About your hospital stay     You were admitted on:  November 30, 2017 You last received care in the:  [address] [facility]    You were discharged on:  December 1, 2017        Reason for your hospital stay       1. Right supraorbital fracture with displacement into frontal sinus  2. Facial laceration  3. T7 compression /deformity with unknown chronicity                  Who to Call     For medical emergencies, please call 911.  For non-urgent questions about your medical care, please call your primary care provider or clinic, [phone]          Attending Provider     Provider Specialty    Miles Hester MD Emergency Medicine    \[facility] [place] [facility]\"", Matthew Rizo MD General Surgery       Primary Care Provider Office Phone # Fax #    Juan Carlos Perez [phone]      After Care Instructions     Activity - Up with nursing assistance           Advance Diet as Tolerated       Follow this diet upon discharge: Regular            Fall precautions           General info for SNF       Length of Stay Estimate: Long Term Care  Condition at Discharge: Stable  Level of care:board and care  Rehabilitation Potential: Fair  Admission H&P remains valid and up-to-date: Yes  Recent Chemotherapy: N/A  Use Nursing Home Standing Orders: Yes            Intake and output       Every shift            Mantoux instructions       Give two-step Mantoux (PPD) Per Facility Policy Yes            Wound care  "(specify)       Site:   Face  Instructions:  Clean daily with soap and water, no dressing needed                  Follow-up Appointments     Follow Up and recommended labs and tests       Follow up with your primary care provider for continued medical care and hospital follow up in 5-10 days.     [facility]: Follow up in Oral and Maxillofacial Surgery clinic in 10-14 days  7th Floor Efren Bowen  [address] [place] [address]  Appointments: [phone]                  Further instructions from your care team       Sinus precautions for 2 weeks including:                        - No sucking through a straw                        - Sneeze with mouth open                        - Avoid blowing nose    Pending Results     Date and Time Order Name Status Description    12/1/2017 0322 EKG 12-lead, complete Preliminary             Statement of Approval     Ordered          12/01/17 1416  I have reviewed and agree with all the recommendations and orders detailed in this document.  EFFECTIVE NOW     Approved and electronically signed by:  Becka Fulton APRN CNP             Admission Information     Date & Time Provider Department Dept. Phone    11/29/2017 Matthew Bowden MD [address] [facility] [phone]      Your Vitals Were     Blood Pressure Pulse Temperature Respirations Height Weight    140/95 (BP Location: Left arm) 115 98.4  F (36.9  C) (Oral) 20 1.676 m (5' 6\") 66.8 kg (147 lb 4.3 oz)    Pulse Oximetry BMI (Body Mass Index)                97% 23.77 kg/m2          [employer] Information     [employer] lets you send messages to your doctor, view your test results, renew your prescriptions, schedule appointments and more. To sign up, go to [email or web address] . Click on \"Log in\" on the left side of the screen, which will take you to the Welcome page. Then click on \"Sign up Now\" on the right side of the page.     You will be asked to enter the access code " listed below, as well as some personal information. Please follow the directions to create your username and password.     Your access code is: [email or web address]  Expires: 3/1/2018  2:48 PM     Your access code will  in 90 days. If you need help or a new code, please call your [place] clinic or [phone].        Care EveryWhere ID     This is your Care EveryWhere ID. This could be used by other organizations to access your [place] medical records  [id number]        Equal Access to Services     [facility] : Hadaster samayoa Sohillary, waaxda luqadaha, qaybta kaalmada jessy, luis gamez . So [facility] [phone].    ATENCIÓN: Si habla español, tiene a wade disposición servicios gratuitos de asistencia lingüística. Llame al [phone].    We comply with applicable federal civil rights laws and [place] laws. We do not discriminate on the basis of race, color, national origin, age, disability, sex, sexual orientation, or gender identity.               Review of your medicines      CONTINUE these medicines which have NOT CHANGED        Dose / Directions    ACETAMINOPHEN PO        Dose:  1000 mg   Take 1,000 mg by mouth 3 times daily   Refills:  0       ASCORBIC ACID PO        Dose:  1000 mg   Take 1,000 mg by mouth daily   Refills:  0       ASPIRIN PO        Dose:  81 mg   Take 81 mg by mouth daily   Refills:  0       calcium carbonate 500 MG chewable tablet   Commonly known as:  TUMS        Dose:  1 chew tab   Take 1 chew tab by mouth 4 times daily as needed for heartburn   Refills:  0       cholecalciferol 1000 UNIT tablet   Commonly known as:  vitamin D3        Dose:  1 tablet   Take 1 tablet by mouth daily.   Refills:  0       LEVOTHYROXINE SODIUM PO        Dose:  88 mcg   Take 88 mcg by mouth daily   Refills:  0       magnesium hydroxide 400 MG/5ML suspension   Commonly known as:  MILK OF MAGNESIA        Dose:  30 mL   Take 30 mLs by mouth daily as needed for constipation  or heartburn   Refills:  0       NITROGLYCERIN SL        Dose:  0.4 mg   Place 0.4 mg under the tongue every 5 minutes as needed for chest pain   Refills:  0       sennosides 8.6 MG tablet   Commonly known as:  SENOKOT        Dose:  1 tablet   Take 1 tablet by mouth daily as needed for constipation   Refills:  0                Protect others around you: Learn how to safely use, store and throw away your medicines at www.disposemymeds.org.             Medication List: This is a list of all your medications and when to take them. Check marks below indicate your daily home schedule. Keep this list as a reference.      Medications           Morning Afternoon Evening Bedtime As Needed    ACETAMINOPHEN PO   Take 1,000 mg by mouth 3 times daily   Last time this was given:  1,000 mg on 12/1/2017  1:09 PM                                ASCORBIC ACID PO   Take 1,000 mg by mouth daily   Last time this was given:  1,000 mg on 12/1/2017  9:22 AM                                ASPIRIN PO   Take 81 mg by mouth daily                                calcium carbonate 500 MG chewable tablet   Commonly known as:  TUMS   Take 1 chew tab by mouth 4 times daily as needed for heartburn                                cholecalciferol 1000 UNIT tablet   Commonly known as:  vitamin D3   Take 1 tablet by mouth daily.   Last time this was given:  1,000 Units on 12/1/2017  9:22 AM                                LEVOTHYROXINE SODIUM PO   Take 88 mcg by mouth daily   Last time this was given:  88 mcg on 12/1/2017  9:22 AM                                magnesium hydroxide 400 MG/5ML suspension   Commonly known as:  MILK OF MAGNESIA   Take 30 mLs by mouth daily as needed for constipation or heartburn                                NITROGLYCERIN SL   Place 0.4 mg under the tongue every 5 minutes as needed for chest pain                                sennosides 8.6 MG tablet   Commonly known as:  SENOKOT   Take 1 tablet by mouth daily as needed for  constipation   Last time this was given:  1 tablet on 12/1/2017  9:22 AM                                          More Information        Facial Fracture  A facial fracture means you have one or more broken bones in your face. These may be in your jaw, nose, cheeks, or the sockets around your eyes. Car accidents are the most common cause of facial fractures. Fights, falls, and sports injuries can also injure facial bones.   When to go to the emergency room (ER)  A broken bone in your face is cause for concern. The airway may become clogged with bone fragments, blood clots, or swollen tissue. Go to the ER or call 911 right away if you have any of these symptoms:    Pain and swelling in your face    Trouble swallowing or breathing    An upper and lower jaw that don't meet properly, or pain when you move your jaw    A displaced jaw or nose    Loose teeth    An open wound where you can see the bone    Blood or clear fluid from your nose    Blurred vision, double vision, or problems moving your eyeball  What to expect in the ER  You will likely be given medicine for pain. A healthcare provider will ask about your injury and examine your head and face. X-rays or other imaging tests may be done. Treatment of facial fractures happens in two stages:    Reduction. The broken bones are put back into place. This is often done after the swelling subsides, but severe fractures may be repaired right away.    Fixation. The broken bones are held together so they heal correctly. A fractured jaw is likely to be wired shut for a time, stabilized with reconstruction plates, or both. A broken nose is treated with a splint or soft packing. Surgery may be done to repair and secure broken bones around your eyes.  Date Last Reviewed: 9/30/2015 2000-2017 [employer]. [address] [place] [address]. All rights reserved. This information is not intended as a substitute for professional medical care. Always follow  your healthcare professional's instructions.                Face Laceration: Stitches or Tape  A laceration is a cut through the skin. This will require stitches if it is deep. Minor cuts may be treated with surgical tape.    Home care    Your healthcare provider may prescribe an antibiotic. This is to help prevent infection. Follow all instructions for taking this medicine. Take the medicine every day until it is gone or you are told to stop. You should not have any left over.    The healthcare provider may prescribe medicines for pain. Follow instructions for taking them.    Follow the healthcare provider s instructions on how to care for the cut.    Wash your hands with soap and warm water before and after caring for the cut. This helps prevent infection.    If a bandage was applied and it becomes wet or dirty, replace it. Otherwise, leave it in place for the first 24 hours, then change it once a day or as directed.    If sutures were used, clean the wound daily:    After removing the bandage, wash the area with soap and water. Use a wet cotton swab to loosen and remove any blood or crust that forms.    After cleaning, keep the wound clean and dry. Talk with your healthcare provider before applying any antibiotic ointment to the wound. Reapply a fresh bandage.    You may remove the bandage to shower as usual after the first 24 hours, but don't soak the area in water (no swimming) until the sutures are removed.    If surgical tape was used, keep the area clean and dry. If it becomes wet, blot it dry with a towel.    Most facial skin wounds heal without problems. However, an infection sometimes occurs despite proper treatment. Therefore, watch for the signs of infection listed below.  Follow-up care  Follow up with your healthcare provider as advised. Be sure to return for removal of the stitches as directed. Ask your provider how long stitches should remain in place. If surgical tape closures were used, you may  remove them yourself when your provider recommends if they have not fallen off on their own.  When to seek medical advice  Call your healthcare provider right away if any of these occur:    Wound bleeding not controlled by direct pressure    Signs of infection, including increasing pain in the wound, increasing wound redness or swelling, or pus or bad odor coming from the wound    Fever of 100.4 F (38 C) or higher or as directed by your healthcare provider    Stitches come apart or fall out or surgical tape falls off before 5 days    Wound edges re-open    Wound changes colors    Numbness around the wound   Date Last Reviewed: 7/1/2017 2000-2017 [employer]. [address] [place] [address], [place] [address]. All rights reserved. This information is not intended as a substitute for professional medical care. Always follow your healthcare professional's instructions.

## 2017-11-29 NOTE — IP AVS SNAPSHOT
[address] [address]    Phone:  [phone]                                       After Visit Summary   11/29/2017    Olga Zelaya    MRN: [id number]           After Visit Summary Signature Page     I have received my discharge instructions, and my questions have been answered. I have discussed any challenges I see with this plan with the nurse or doctor.    ..........................................................................................................................................  Patient/Patient Representative Signature      ..........................................................................................................................................  Patient Representative Print Name and Relationship to Patient    ..................................................               ................................................  Date                                            Time    ..........................................................................................................................................  Reviewed by Signature/Title    ...................................................              ..............................................  Date                                                            Time

## 2017-11-29 NOTE — ED PROVIDER NOTES
History     Chief Complaint:  Fall    The history is provided by the patient.      Olga Zelaya is a 89 year old female, with a history of dementia, type 2 diabetes, hypertension, and hyperlipidemia, who presents with her family to the ED for evaluation of fall. The patient reports she fell in her apartment hallway today. The patient notes she does not remember anything else about the incident. The patient reports she has a headache. The patient's daughter reports it was noted that the patient was not using her walker today. The patient denies any loss of consciousness, neck pain or other pain.     Allergies:  Penicillins   Sulfa drugs    Nabumetone  Codeine   Biaxin   Relafen     Medications:  ASPIRIN PO 81mg   NITROGLYCERIN SL   LEVOTHYROXINE SODIUM PO   ASCORBIC ACID PO   ACETAMINOPHEN PO   ISOSORBIDE MONONITRATE PO   mineral oil enema   magnesium hydroxide (MILK OF MAGNESIA) 400 MG/5ML suspension   sennosides (SENOKOT) 8.6 MG tablet   cholecalciferol (VITAMIN D) 1000 UNIT tablet      Past Medical History:    Dementia    Vision loss  Left elbow fracture  Type 2 DM  HTN  Hypothyroidism  HL    Past Surgical History:    Appendectomy   Cholecystectomy   Left temporal artery biopsy     Family History:    Diabetes    Social History:  Smoking status: Never smoker  Alcohol use: No  Presents to ED with daughter    Resides in nursing home   Marital Status:   [5]    Review of Systems   Musculoskeletal: Negative for neck pain.   Neurological: Positive for headaches. Negative for syncope.   All other systems reviewed and are negative.    Physical Exam     Patient Vitals for the past 24 hrs:   BP Temp Temp src Heart Rate Resp SpO2   11/29/17 1500 (!) 147/100 - - 109 27 97 %   11/29/17 1445 (!) 131/97 - - 107 18 94 %   11/29/17 1430 (!) 128/104 - - 111 15 97 %   11/29/17 1415 (!) 163/104 - - 108 28 98 %   11/29/17 1359 (!) 176/123 97.8  F (36.6  C) Oral 108 18 98 %     Physical Exam  Constitutional: Patient  appears well-developed and well-nourished. There is minimal distress.   HENT:   Head: No external signs of trauma. No lesions noted.  Eyes: PEERL, EOMI B/L, no pain or limitation of superior gaze. No scleral hemorrhage  Nose: Noncongested, no exudates. No rhinorrhea. No FB noted  Mouth/Throat:    Mucous membranes are tacky   No Oropharyngeal exudate. No oropharyngeal erythema noted.  Neck:  FROM. Neck is supple. No midline TTP.  Cardiovascular: Normal rate, regular rhythm and normal heart sounds.  Exam reveals no gallop and no friction rub.  No murmur heard. Normal peripheral pulses.  Pulmonary/Chest: Effort normal and breath sounds normal. No respiratory distress. Patient has no wheezes. Patient has no rales.   Abdominal: Soft. There is no tenderness.   Extremities: No edema noted  Neurological: Patient is alert and oriented to person and birthday. She is very hard of hearing.   Skin: Skin is warm and dry. There is no diaphoresis noted. There is a 2cm abrasion below the right orbital rim with a small 1 cm area consistent with laceration. There is bruising around the right eye.     Emergency Department Course     ECG (14:09:23):  Rate 10/8 bpm. ME interval 244. QRS duration 106. QT/QTc 400/536. P-R-T axes 110 -49 45. Sinus tachycardia with 1st degree AV block. Left axis deviation. Pulmonary disease pattern. Septal infarct, age undetermined. Prolonged QT. Abnormal ECG. No significant change compared to EKG dated 4/26/2017. Interpreted at 1424 by oTni Wyatt DO.    Imaging:  Radiographic findings were communicated with the patient and family who voiced understanding of the findings.    Maxillofacial CT w/o Contrast  IMPRESSION:   1. Right supraorbital rim fracture with superior displacement of the  fracture fragment into the frontal sinus. There is orbital fat that  extends into the fracture.  2. Right preorbital soft tissue swelling.  3. No other facial bone fractures identified.  4. Small hemorrhage layer  within the right maxillary sinus.  5. Odontogenic disease.  As read by Radiology.    Head CT w/o Contrast  IMPRESSION:     1. No evidence of acute intracranial hemorrhage, mass, or herniation.  2. There is generalized atrophy of the brain. White matter changes are  present in the cerebral hemispheres that are consistent with small  vessel ischemic disease in this age patient.   3. Small fluid layer within the right maxillary sinus. Right  preorbital soft tissue swelling. Please see dedicated facial bone CT  for further details.  As read by Radiology.    Cervical Spine CT w/o Contrast  IMPRESSION:   1. No evidence of acute fracture or subluxation in the cervical spine.  2. Multilevel degenerative changes throughout the cervical spine.   As read by Radiology.    Shoulder XR, G/E 3 Views, Right  IMPRESSION: No fracture or dislocation identified in the right  shoulder. Moderate right acromioclavicular degenerative change. No  significant soft tissue swelling. As read by Radiology.    Laboratory:  CBC: o/w WNL (WBC 6.5, HGB 12.6, )   BMP: Glucose 171(H), GFR estimate 53(L), o/w WNL (Creatinine 0.97)    Procedures:     Laceration Repair        LACERATION:  A simple and superficial clean 1 cm laceration.      LOCATION:  Right cheek      FUNCTION:  Distally sensation and circulation are intact.      ANESTHESIA:  None      PREPARATION:  Irrigation and srubbing with Normal Saline and Sea Clens      DEBRIDEMENT:  no debridement      CLOSURE:  Wound was closed with Dermabond  Interventions:  1647: NS 500mLs Bolus IV   1814: Hydralazine 10mg IV    Emergency Department Course:  Patient arrived by EMS.    Past medical records, nursing notes, and vitals reviewed.  1428: I performed an exam of the patient and obtained history, as documented above.  IV inserted and blood drawn.    The patient was sent for a maxillofacial CT, cervical spine CT, head CT, and right shoulder x-ray while in the emergency department, findings  above.    1609: I rechecked the patient. Explained findings to patient and daughter.    1619: I spoke with Dr. Hester, [facility] ED physician.    1712: I performed the laceration repair as noted above.     Findings and plan explained to the Patient and daughter. Patient will be transferred to [facility] ED via EMS. Discussed the case with Dr. Hester, who will admit the patient to a monitored bed for further monitoring, evaluation, and treatment.     Impression & Plan      Medical Decision Making:  Patient presents to ER after a fall. Please see the HPI and exam for specifics. The patient's CT imaging only showed a fracture of the right superior orbital rim with extension into the right frontal sinus. I discussed this with the ED physician at the [facility] who accepts the patient in transfer, and will have the [facility] service evaluate the patient for further recommendations.     Impressions:    ICD-10-CM   1. Open fracture of right orbit, initial encounter (H) S02.81XB   2. Closed fracture of frontal sinus, initial encounter (H) S02.19XA   3. Fall, initial encounter W19.XXXA   4. Abrasion of face, initial encounter S00.81XA   5. Facial laceration, initial encounter S01.81XA     Disposition: Patient transferred to [facility] ED via EMS to be seen by Dr. Mir Manzo  11/29/2017   [facility] EMERGENCY DEPARTMENT    I, Henrietta Manzo, am serving as a scribe at 2:28 PM on 11/29/2017 to document services personally performed by Toni Wyatt DO based on my observations and the provider's statements to me.        Toni Wyatt DO  11/29/17 0133

## 2017-11-29 NOTE — IP AVS SNAPSHOT
Olag Zelaya #[id number] (CSN: [id number])  (89 year old F)  (Adm: 17)     [id number]               [address] [facility] [place] BANK: [phone]            Patient Demographics     Patient Name Sex          Age SSN Address Phone    Olga Zelaya Female 1923 (89 year old) [id number] [address] [phone] (Home)  NONE (Work)  [phone] (Mobile) *Preferred*      Emergency Contact(s)     Name Relation Home Work Mobile    Linh Welsh Daughter [phone] NONE [phone]    NONE Other [phone]        Admission Information     Attending Provider Admitting Provider Admission Type Admission Date/Time    Matthew Bowden MD Hoskuldsson, Torfi Thorkell, MD Emergency 17  1906    Discharge Date Hospital Service Auth/Cert Status Service Area     Trauma     Unit Room/Bed Admission Status        U6B 6223/6223-01 Admission (Confirmed)       Admission     Complaint    Fall, Orbital wall fracture (H)      Hospital Account     Name Acct ID Class Status Primary Coverage    Olga Zelaya [id number] Observation Open MEDICARE - RR MEDICARE             Guarantor Account (for Hospital Account #[id number])     Name Relation to Pt Service Area Active? Acct Type    Olga Zelaya  FCS Yes Personal/Family    Address Phone          [address] [address] [phone](H)              Coverage Information (for Hospital Account #[id number])     1. MEDICARE/RR MEDICARE      F/O Payor/Plan Precert #    MEDICARE/RR MEDICARE      Subscriber Subscriber #    Olga Zelaya [id number]    Address Phone    [address] [address]  [place] [address] [phone]          2. [place]     F/[place] Payor/Plan Precert #    [place]     Subscriber Subscriber #    Olga Zelaya [id number]    Address Phone    [address] [address]  [place], [place] [id number] [phone]                                                 "      INTERAGENCY TRANSFER FORM - PHYSICIAN ORDERS   11/29/2017                       [address] [facility]: [phone]            Attending Provider: Matthew Bowden MD     Allergies:  Biaxin [Clarithromycin], Codeine, Nabumetone, Penicillins, Seafood, Sulfa Drugs    Infection:  None   Service:  TRAUMA    Ht:  1.676 m (5' 6\")   Wt:  66.8 kg (147 lb 4.3 oz)   Admission Wt:  65.8 kg (145 lb 1 oz)    BMI:  23.77 kg/m 2   BSA:  1.76 m 2            ED Clinical Impression     Diagnosis Description Comment Added By Time Added    Fall [W19.XXXA] Fall [W19.XXXA]  Miles Hester MD 11/29/2017 11:23 PM    Closed fracture of seventh thoracic vertebra, unspecified fracture morphology, sequela [S22.069S] Closed fracture of seventh thoracic vertebra, unspecified fracture morphology, sequela [S22.069S]  Miles Hester MD 11/30/2017  1:32 AM    Closed fracture of orbit, initial encounter (H) [S02.80XA] Closed fracture of orbit, initial encounter (H) [S02.80XA]  Miles Hester MD 11/30/2017  1:32 AM    Facial laceration, initial encounter [S01.81XA] Facial laceration, initial encounter [S01.81XA]  Miles Hester MD 11/30/2017  1:32 AM    Other specified hearing loss of both ears [H91.8X3] Other specified hearing loss of both ears [H91.8X3]  Miles Hester MD 11/30/2017  1:32 AM      Hospital Problems as of 12/1/2017              Priority Class Noted POA    Fall Medium  11/30/2017 Yes    Orbital wall fracture (H) Medium  11/30/2017 Yes      Non-Hospital Problems as of 12/1/2017              Priority Class Noted    Left elbow fracture Medium  2/20/2012    Type 2 diabetes, HbA1c goal < 7% (H) Medium  2/20/2012    HTN (hypertension) Medium  2/20/2012    Hypothyroidism Medium  2/20/2012    Hyperlipidemia LDL goal <100 Medium  2/20/2012    Vision loss of left eye Medium  4/26/2017    Vision loss Medium  4/27/2017    Advance Care Planning Medium  8/4/2017      Code Status History     Date Active " Date Inactive Code Status Order ID Comments User Context    12/1/2017 11:05 AM  DNR/DNI [id number]  Becka Fulton APRN CNP Outpatient    11/30/2017  1:47 AM 12/1/2017 11:05 AM DNR/DNI [id number]  Sarita Bronson MD Inpatient    4/30/2017 10:42 AM 11/30/2017  1:47 AM DNR/DNI [id number]  Teodoro Leonard MD Outpatient    4/26/2017  9:41 PM 4/30/2017 10:42 AM DNR/DNI [id number]  Patricio Ho MD Inpatient    2/20/2012 12:08 AM 2/20/2012  5:16 PM Full Code [id number]  Gareth Blanchard MD Inpatient      Current Code Status     Date Active Code Status Order ID Comments User Context       Prior      Summary of Visit     Reason for your hospital stay       1. Right supraorbital fracture with displacement into frontal sinus  2. Facial laceration  3. T7 compression /deformity with unknown chronicity                Medication Review      CONTINUE these medications which have NOT CHANGED        Dose / Directions Comments    ACETAMINOPHEN PO        Dose:  1000 mg   Take 1,000 mg by mouth 3 times daily   Refills:  0        ASCORBIC ACID PO        Dose:  1000 mg   Take 1,000 mg by mouth daily   Refills:  0        ASPIRIN PO        Dose:  81 mg   Take 81 mg by mouth daily   Refills:  0        calcium carbonate 500 MG chewable tablet   Commonly known as:  TUMS        Dose:  1 chew tab   Take 1 chew tab by mouth 4 times daily as needed for heartburn   Refills:  0        cholecalciferol 1000 UNIT tablet   Commonly known as:  vitamin D3        Dose:  1 tablet   Take 1 tablet by mouth daily.   Refills:  0        LEVOTHYROXINE SODIUM PO        Dose:  88 mcg   Take 88 mcg by mouth daily   Refills:  0        magnesium hydroxide 400 MG/5ML suspension   Commonly known as:  MILK OF MAGNESIA        Dose:  30 mL   Take 30 mLs by mouth daily as needed for constipation or heartburn   Refills:  0        NITROGLYCERIN SL        Dose:  0.4 mg   Place 0.4 mg under the tongue every 5 minutes as needed for chest pain   Refills:  0         sennosides 8.6 MG tablet   Commonly known as:  SENOKOT        Dose:  1 tablet   Take 1 tablet by mouth daily as needed for constipation   Refills:  0                After Care     Activity - Up with nursing assistance           Advance Diet as Tolerated       Follow this diet upon discharge: Regular       Fall precautions           General info for SNF       Length of Stay Estimate: Long Term Care  Condition at Discharge: Stable  Level of care:board and care  Rehabilitation Potential: Fair  Admission H&P remains valid and up-to-date: Yes  Recent Chemotherapy: N/A  Use Nursing Home Standing Orders: Yes       Intake and output       Every shift       Mantoux instructions       Give two-step Mantoux (PPD) Per Facility Policy Yes       Wound care (specify)       Site:   Face  Instructions:  Clean daily with soap and water, no dressing needed               Further instructions from your care team       Sinus precautions for 2 weeks including:                        - No sucking through a straw                        - Sneeze with mouth open                        - Avoid blowing nose    Follow-Up Appointment Instructions     Follow Up and recommended labs and tests       Follow up with your primary care provider for continued medical care and hospital follow up in 5-10 days.     [facility]: Follow up in Oral and Maxillofacial Surgery clinic in 10-14 days  [facility] Floor [address] [address]  Appointments: [phone]             Statement of Approval     Ordered          12/01/17 1416  I have reviewed and agree with all the recommendations and orders detailed in this document.  EFFECTIVE NOW     Approved and electronically signed by:  Becka Fulton APRN CNP                                                 INTERAGENCY TRANSFER FORM - NURSING   11/29/2017                       [address] [facility] BANK: [phone]            Attending Provider: Matthew Bowden  "MD Darrius     Allergies:  Biaxin [Clarithromycin], Codeine, Nabumetone, Penicillins, Seafood, Sulfa Drugs    Infection:  None   Service:  TRAUMA    Ht:  1.676 m (5' 6\")   Wt:  66.8 kg (147 lb 4.3 oz)   Admission Wt:  65.8 kg (145 lb 1 oz)    BMI:  23.77 kg/m 2   BSA:  1.76 m 2            Advance Directives        Does patient have a scanned Advance Directive/ACP document in EPIC?           Yes        Immunizations     Name Date      TD (ADULT, 7+) 03/31/13       ASSESSMENT     Discharge Profile Flowsheet     DISCHARGE NEEDS ASSESSMENT     SKIN      Equipment Currently Used at Home  walker, standard;grab bar;shower chair 11/30/17 1412   Inspection of bony prominences  Full 12/01/17 1240    Transportation Available  family or friend will provide;car 11/30/17 1412   Inspection under devices  Full 12/01/17 1240    GASTROINTESTINAL (ADULT,PEDIATRIC,OB)     Skin WDL  ex;color 12/01/17 1240    GI WDL  WDL 12/01/17 1240   Skin Color/Characteristics  bruised (ecchymotic);pale 12/01/17 1240    Passing flatus  yes 11/30/17 2130   Skin Temperature  warm 12/01/17 1240    COMMUNICATION ASSESSMENT     Skin Moisture  dry 12/01/17 1240    Patient's communication style  spoken language (English or Bilingual) 11/29/17 1907   Skin Elasticity  quick return to original state 12/01/17 0405    FINAL RESOURCES     Skin Integrity  abrasion(s);bruise(s) 12/01/17 1240    PAS Number  [id number] 04/30/17 1139   SAFETY      Senior Linkage Line Referral Placed  04/30/17 04/30/17 1139   Safety WDL  ex 12/01/17 1240    Referrals Placed  Emergent Admission to SNF/TCU 04/30/17 1139   All Alarms  alarm(s) activated and audible 12/01/17 1248                 Assessment WDL (Within Defined Limits) Definitions           Safety WDL     Effective: 09/28/15    Row Information: <b>WDL Definition:</b> Bed in low position, wheels locked; call light in reach; upper side rails up x 2; ID band on<br> <font color=\"gray\"><i>Item=AS safety wdl>>List=AS safety " "wdl>>Version=F14</i></font>      Skin WDL     Effective: 09/28/15    Row Information: <b>WDL Definition:</b> Warm; dry; intact; elastic; without discoloration; pressure points without redness<br> <font color=\"gray\"><i>Item=AS skin wdl>>List=AS skin wdl>>Version=F14</i></font>      Vitals     Vital Signs Flowsheet     VITAL SIGNS     Side Effects Monitoring: Respiratory Depth  N 11/30/17 2120    Temp  98.4  F (36.9  C) 12/01/17 1239   Side Effects Monitoring: Sedation Level  1 11/30/17 2120    Temp src  Oral 12/01/17 1239   HEIGHT AND WEIGHT      Resp  20 12/01/17 1239   Height  1.676 m (5' 6\") 11/30/17 0203    Pulse  115 12/01/17 1239   Height Method  Estimated 11/29/17 1911    Heart Rate  134 12/01/17 0328   Weight  66.8 kg (147 lb 4.3 oz) 12/01/17 0255    Pulse/Heart Rate Source  Monitor 12/01/17 1239   Weight Method  Standing scale 12/01/17 0255    BP  (!)  140/95 12/01/17 1239   Estimated Weight (From ED)  63.5 kg (140 lb) 11/29/17 1911    BP Location  Left arm 12/01/17 1239   OLGA COMA SCALE      OXYGEN THERAPY     Best Eye Response  4-->(E4) spontaneous 12/01/17 0947    SpO2  97 % 12/01/17 1239   Best Motor Response  6-->(M6) obeys commands 12/01/17 0947    O2 Device  None (Room air) 12/01/17 1239   Best Verbal Response  4-->(V4) confused 12/01/17 0947    Oxygen Delivery  2 LPM 12/01/17 0755   Olga Coma Scale Score  14 12/01/17 0947    PAIN/COMFORT     POSITIONING      Patient Currently in Pain  denies 12/01/17 0330   Body Position  independently positioning 12/01/17 1248    Preferred Pain Scale  CAPA (Clinically Aligned Pain Assessment) ([facility], [facility] and [facility] Adults Only) 11/30/17 2120   Head of Bed (HOB)  HOB at 60 degrees 12/01/17 1248    Pain Location  Head 11/30/17 1313   Chair  Upright in chair 11/30/17 0146    Pain Descriptors  Aching 11/30/17 1313   Positioning/Transfer Devices  pillows 11/30/17 2120    Pain Intervention(s)  Medication (See eMAR) 11/30/17 1313   DAILY CARE      CLINICALLY " ALIGNED PAIN ASSESSMENT (CAPA) ([facility], [facility] AND [facility] ADULTS ONLY)     Activity Management  activity adjusted per tolerance 12/01/17 1248    Comfort  negligible pain 11/30/17 2120   Activity Assistance Provided  assistance, 1 person 12/01/17 1248    Change in Pain  getting better 11/30/17 1647   ECG      Pain Control  partially effective 11/30/17 1647   ECG Rhythm  Supraventricular tachycardia 12/01/17 0355    Functioning  can do most things, but pain gets in the way of some 11/30/17 1647   Ectopy  None 11/30/17 2120    Sleep  normal sleep 11/30/17 1647   POINT OF CARE TESTING      ANALGESIA SIDE EFFECTS MONITORING     Puncture Site  fingertip 11/30/17 0355    Side Effects Monitoring: Respiratory Quality  R 11/30/17 2120   Bedside Glucose (mg/dl )   110 mg/dl 11/30/17 0355            Patient Lines/Drains/Airways Status    Active LINES/DRAINS/AIRWAYS     Name: Placement date: Placement time: Site: Days: Last dressing change:    Peripheral IV 11/29/17 Right Upper forearm 11/29/17   1358   Upper forearm   2     Peripheral IV 11/30/17 Right;Anterior 11/30/17   0353      1     Wound 11/30/17 Right Eye Other (comment) [place] Eye/Lower Right eye Laceration 11/30/17   0137   Eye   1     Wound 11/30/17 Anterior;Lower;Left Arm Skin tear scabbed 11/30/17   0400   Arm   1     Incision/Surgical Site 04/28/17 Left Head 04/28/17   1558    216             Patient Lines/Drains/Airways Status    Active PICC/CVC     None            Intake/Output Detail Report     Date Intake     Output Net    Shift P.O. I.V. IV Piggyback Total Urine Total       Day 11/30/17 0000 - 11/30/17 0659 -- 50 750 800 -- -- 800    Cally 11/30/17 0700 - 11/30/17 1459 120 50 -- 170 250 250 -80    Noc 11/30/17 1500 - 11/30/17 2359 120 821.67 -- 941.67 300 300 641.67    Day 12/01/17 0000 - 12/01/17 0659 150 428.33 -- 578.33 320 320 258.33    Cally 12/01/17 0700 - 12/01/17 1459 300 -- -- 300 350 350 -50      Last Void/BM       Most Recent Value    Urine  Occurrence 1 at 11/30/2017 2300    Stool Occurrence       Case Management/Discharge Planning     Case Management/Discharge Planning Flowsheet     REFERRAL INFORMATION     Equipment Currently Used at Home  walker, standard;grab bar;shower chair 11/30/17 1412    Arrived From  other (see comments) ([facility]) 04/27/17 1236   PAS Number  [id number] 04/30/17 1139    LIVING ENVIRONMENT     Senior Linkage Line Referral Placed  04/30/17 04/30/17 1139    Lives With  facility resident 11/30/17 1412   Referrals Placed  Emergent Admission to SNF/TCU 04/30/17 1139    Living Arrangements  assisted living (memory care) 11/30/17 1412   ABUSE RISK SCREEN      COPING/STRESS     QUESTION TO PATIENT:  Has a member of your family or a partner(now or in the past) intimidated, hurt, manipulated, or controlled you in any way?  no 11/29/17 1919    Major Change/Loss/Stressor  illness;hospitalization 11/30/17 0133   QUESTION TO PATIENT: Do you feel safe going back to the place where you are living?  yes 11/29/17 1919    DISCHARGE PLANNING     OBSERVATION: Is there reason to believe there has been maltreatment of a vulnerable adult (ie. Physical/Sexual/Emotional abuse, self neglect, lack of adequate food, shelter, medical care, or financial exploitation)?  no 11/29/17 1919    Transportation Available  family or friend will provide;car 11/30/17 1412   (R) MENTAL HEALTH SUICIDE RISK      FINAL RESOURCES     Are you depressed or being treated for depression?  No 11/30/17 0133                  [address] [facility] BANK: [phone]            Medication Administration Report for GarciaOlga as of 12/01/17 1448   Legend:    Given Hold Not Given Due Canceled Entry Other Actions    Time Time (Time) Time  Time-Action       Inactive    Active    Linked        Medications 11/25/17 11/26/17 11/27/17 11/28/17 11/29/17 11/30/17 12/01/17    0.9% sodium chloride infusion  Rate: 0-10 mL/hr Freq: CONTINUOUS Route: IV  Last Dose: 1,000 mL (12/01/17 0445)  Start:  "12/01/17 0445   Admin Instructions: tko           0445 (1,000 mL)-Rate/Dose Change           acetaminophen (TYLENOL) tablet 1,000 mg  Dose: 1,000 mg Freq: 3 TIMES DAILY Route: PO  Start: 11/30/17 0147   Admin Instructions: Maximum acetaminophen dose from all sources = 75 mg/kg/day not to exceed 4 gram          (0936)-Not Given       1259 (1,000 mg)-Given       1542-Hold       (2106)-Not Given        0922 (1,000 mg)-Given       1309 (1,000 mg)-Given       [ ] 2000           ascorbic acid (VITAMIN C) tablet 1,000 mg  Dose: 1,000 mg Freq: DAILY Route: PO  Start: 11/30/17 0800         (0936)-Not Given        0922 (1,000 mg)-Given           calcium carbonate (TUMS) chewable tablet 500 mg  Dose: 500 mg Freq: 4 TIMES DAILY PRN Route: PO  PRN Reason: heartburn  Start: 11/30/17 1007              cholecalciferol (vitamin D3) tablet 1,000 Units  Dose: 1,000 Units Freq: DAILY Route: PO  Start: 11/30/17 0800         (0936)-Not Given        0922 (1,000 Units)-Given           clindamycin (CLEOCIN) infusion 600 mg  Dose: 600 mg Freq: EVERY 8 HOURS Route: IV  Indications of Use: SKIN AND SOFT TISSUE INFECTION  Last Dose: 600 mg (12/01/17 1256)  Start: 11/30/17 0300         0512 (600 mg)-New Bag       1259 (600 mg)-New Bag       2103 (600 mg)-New Bag        0441 (600 mg)-New Bag       1256 (600 mg)-New Bag       [ ] 2000           levothyroxine (SYNTHROID/LEVOTHROID) tablet 88 mcg  Dose: 88 mcg Freq: DAILY Route: PO  Start: 11/30/17 1015         1259 (88 mcg)-Given        0922 (88 mcg)-Given           lidocaine (LMX4) kit  Freq: EVERY 1 HOUR PRN Route: Top  PRN Reason: mild pain  PRN Comment: with VAD insertion or accessing implanted port,  Start: 11/30/17 0147   Admin Instructions: Do NOT give if patient has a history of allergy to any local anesthetic or any \"katie\" product.   Apply 30 min prior to VAD insertion or port access.  MAX Dose:  2.5 gm (  of 5 gm tube)               lidocaine 1 % 1 mL  Dose: 1 mL Freq: EVERY 1 HOUR PRN " "Route: OTHER  PRN Comment: mild pain with VAD insertion or accessing implanted port,  Start: 11/30/17 0147   Admin Instructions: Do NOT give if patient has a history of allergy to any local anesthetic or any \"katie\" product. MAX dose 1 mL subcutaneous OR intradermal in divided doses.               magnesium hydroxide (MILK OF MAGNESIA) suspension 30 mL  Dose: 30 mL Freq: DAILY PRN Route: PO  PRN Reason: constipation  Start: 11/30/17 0147   Admin Instructions: Shake well.               naloxone (NARCAN) injection 0.1-0.4 mg  Dose: 0.1-0.4 mg Freq: EVERY 2 MIN PRN Route: IV  PRN Reason: opioid reversal  Start: 11/30/17 0147   Admin Instructions: For respiratory rate LESS than or EQUAL to 8.  Partial reversal dose:  0.1 mg titrated q 2 minutes for Analgesia Side Effects Monitoring Sedation Level of 3 (frequently drowsy, arousable, drifts to sleep during conversation).Full reversal dose:  0.4 mg bolus for Analgesia Side Effects Monitoring Sedation Level of 4 (somnolent, minimal or no response to stimulation).  For ordered doses up to 2mg give IVP. Give each 0.4mg over 15 seconds in emergency situations. For non-emergent situations further dilute in 9mL of NS to facilitate titration of response.               nitroGLYcerin (NITROSTAT) sublingual tablet 0.4 mg  Dose: 0.4 mg Freq: EVERY 5 MIN PRN Route: SL  PRN Reason: chest pain  Start: 11/30/17 0147              ondansetron (ZOFRAN-ODT) ODT tab 4 mg  Dose: 4 mg Freq: EVERY 6 HOURS PRN Route: PO  PRN Reasons: nausea,vomiting  Start: 11/30/17 0147   Admin Instructions: This is Step 1 of nausea and vomiting management.  If nausea not resolved in 15 minutes, go to Step 2 prochlorperazine (COMPAZINE). Do not push through foil backing. Peel back foil and gently remove. Place on tongue immediately. Administration with liquid unnecessary                     Or  ondansetron (ZOFRAN) injection 4 mg  Dose: 4 mg Freq: EVERY 6 HOURS PRN Route: IV  PRN Reasons: nausea,vomiting  Start: " 11/30/17 0147   Admin Instructions: This is Step 1 of nausea and vomiting management.  If nausea not resolved in 15 minutes, go to Step 2 prochlorperazine (COMPAZINE).  Irritant. For ordered doses up to 4 mg, give IV Push undiluted over 2-5 minutes.           1256 (4 mg)-Given           sennosides (SENOKOT) tablet 1 tablet  Dose: 1 tablet Freq: DAILY Route: PO  Start: 11/30/17 0800         (0936)-Not Given        0922 (1 tablet)-Given           sodium chloride (PF) 0.9% PF flush 3 mL  Dose: 3 mL Freq: EVERY 8 HOURS Route: IK  Start: 11/30/17 0300   Admin Instructions: And Q1H PRN, to lock peripheral IV dormant line.          (0404)-Not Given       (1243)-Not Given       (2103)-Not Given        (0440)-Not Given       1256 (3 mL)-Given       [ ] 1900           sodium chloride (PF) 0.9% PF flush 3 mL  Dose: 3 mL Freq: EVERY 1 HOUR PRN Route: IK  PRN Reasons: line flush,post meds or blood draw  Start: 11/30/17 0147   Admin Instructions: for peripheral IV flush post IV meds               traMADol (ULTRAM) half-tab 25 mg  Dose: 25 mg Freq: EVERY 4 HOURS PRN Route: PO  PRN Reason: moderate pain  Start: 11/30/17 0147             Completed Medications  Medications 11/25/17 11/26/17 11/27/17 11/28/17 11/29/17 11/30/17 12/01/17         Dose: 1,000 mL Freq: ONCE Route: IV  Last Dose: 1,000 mL (11/30/17 0351)  Start: 11/30/17 0200   End: 11/30/17 0412         0312 (1,000 mL)-New Bag       0351 (1,000 mL)-Rate/Dose Verify              Freq: ONCE Route: IV  Start: 11/29/17 2057   End: 11/29/17 2143 2143 ( )-New Bag               Dose: 12.5 mcg Freq: ONCE Route: IV  Start: 11/29/17 2133   End: 11/29/17 2140   Admin Instructions: For ordered doses up to 100 mcg give IV Push undiluted over a minimum of 3-5 minutes.         2140 (12.5 mcg)-Given               Dose: 130 mL Freq: ONCE Route: IV  Start: 11/29/17 2155   End: 11/29/17 2209 2209 (130 mL)-Given               Dose: 2 g Freq: ONCE Route: IV  Start: 12/01/17 5668    End: 12/01/17 0540          0540 (2 g)-New Bag             Dose: 4 mg Freq: ONCE Route: IV  Start: 11/29/17 2023   End: 11/29/17 2027   Admin Instructions: Irritant. For ordered doses up to 4 mg, give IV Push undiluted over 2-5 minutes.         2025 (4 mg)-Given               Dose: 20 mEq Freq: ONCE Route: PO  Start: 12/01/17 0545   End: 12/01/17 0614   Admin Instructions: DO NOT CRUSH.           0614 (20 mEq)-Given             Dose: 76 mL Freq: ONCE Route: IV  Start: 11/29/17 2155   End: 11/29/17 2213   Admin Instructions: This entry is for use by Radiology to intermittently used as a flush in patients receiving a CT scan.         2213 (76 mL)-Given            Discontinued Medications  Medications 11/25/17 11/26/17 11/27/17 11/28/17 11/29/17 11/30/17 12/01/17         Rate: 100 mL/hr Freq: CONTINUOUS Route: IV  Last Dose: 1,000 mL (11/30/17 2343)  Start: 11/30/17 0300   End: 12/01/17 0435                0317-Hold       0514 (100 mL)-Restarted       1259 (1,000 mL)-New Bag       2343 (1,000 mL)-New Bag        0435-Med Discontinued                INTERAGENCY TRANSFER FORM - NOTES (H&P, Discharge Summary, Consults, Procedures, Therapies)   11/29/2017                       [address] [facility] BANK: [phone]               History & Physicals      H&P by Sarita Bronson MD at 11/29/2017  7:42 PM     Author:  Sarita Bronson MD Service:  Trauma Author Type:  Resident    Filed:  11/30/2017 12:43 AM Date of Service:  11/29/2017  7:42 PM Creation Time:  11/29/2017  7:42 PM    Status:  Cosign Needed :  Sarita Bronson MD (Resident)    Cosign Required:  Yes             [facility] [place]    History and Physical / Consult note: Trauma Service     Date of Admission:  11/29/2017    Time of Admission/Consult Request (page/call): 7:30pm 11/29    Time of my evaluation: 7:45pm 11/29  Consulting services:[MS1.1]  Oral Maxillofacial - Called by ED  (facial trauma)[MS1.2]    Assessment & Plan   Trauma  mechanism:[MS1.1] unwitnessed fall[MS1.2]  Time/date of injury:[MS1.1] ~1pm, 11/29[MS1.2]   Known Injuries:[MS1.1]  1. Right supraorbital fracture with displacement into frontal sinus  2. Facial laceration[MS1.2]    Other diagnoses:[MS1.1]   1. Memory loss  2. Hypothyroidism  3. DM2  4. HL  5. HTN  6. H/o NSTEMI  7. Hearing loss[MS1.3]    PLAN:     Neuro:[MS1.1] Pain - acet q8h, tramadol q4h prn.   ENT: Orbital fracture - OMFS consulted; recommend sinus precautions, antibiotics for facial lac, and follow-up in clinic. ?Peridontitis - CT max face findings concerning for peridontitis, f/u with Dentist as outpatient.  CV: Tachycardia - EKG with sinus tach and prolonged QTc (536s), has received 1L NS, will give additional 1L NS then MIVF, tele overnight. Hold PTA aspirin pending work-up.[MS1.3]  Heme:[MS1.1] Hg 12.4, recheck in am.[MS1.3]  Pulm:[MS1.1] No issues. Encourage pulm toilet.[MS1.3]  GI/FEN:[MS1.1] NPO pending work-up and observation.[MS1.3]  Renal/:[MS1.1] Monitor I&O.[MS1.3]  Endocrine:[MS1.1] Hypothyroidism - PTA levo.[MS1.3]  ID:[MS1.1] Antibiotics for soft tissue infection prophylaxis given facial lac (per OMFS)[MS1.3]; clindamycin ordered pending OFMS final recs[MS1.4]. Check UA.[MS1.3]   MSK:[MS1.1] Up with assist. PT/OT in am.[MS1.3]   Prophylaxis:[MS1.1] SCDs, stool softeners.     Seen[MS1.3] with [MS1.1] [facility], trauma staff[MS1.3].  Sarita Bronson MD (PGY5)  Surgery  - - - - - -    CHIEF COMPLAINT:[MS1.1] fall[MS1.3]    HISTORY OF PRESENT ILLNESS:[MS1.1] Ms. Olga Zelaya[MS1.3] is a[MS1.1] 89[MS1.3]-year-old[MS1.1] woman with memory loss, hearing loss, HTN, HL, and DM2 who presents to [facility] ED from [facility] ED after a fall at her memory care facility. The patient does not recall the fall however per her family, she was found down in the hallway of her facility. It appeared she was ambulating without the aide of her walker. Uncertain if LOC occurred and no additional details regarding the  "event are available.     At present, the patient reports pain \"all over\". Does note pain in her neck (\"behind my [right] ear\") and right shoulder. Her family states she has also reported pain near her right eye. The patient states her vision from her right eye is diminished.[MS1.3]    REVIEW OF SYSTEMS: As noted above.[MS1.1] Full ROS complicated by patient's memory loss and hearing loss. Her daughter does state that the patient does occasionally have dizziness.[MS1.3]     PAST MEDICAL HISTORY:[MS1.1]   DM2  HL  HTN  Memory loss  Hearing loss  Hypothyroidism[MS1.3]    SURGICAL HISTORY:   Past Surgical History:   Procedure Laterality Date     APPENDECTOMY       BIOPSY ARTERY TEMPORAL Left 4/28/2017    Procedure: BIOPSY ARTERY TEMPORAL;  LEFT TEMPORAL ARTERY BIOPSY.;  Surgeon: Jem Jaramillo MD;  Location: [facility] OR     CHOLECYSTECTOMY         SOCIAL HISTORY: Tob -[MS1.1] daughter denies[MS1.3]. Etoh -[MS1.1] daughter denies[MS1.3].[MS1.1] Lives in a memory care facility. Daughter lives in the area.[MS1.3]     FAMILY HISTORY: No bleeding/clotting disorder[MS1.1]s.[MS1.3]    ALLERGIES:   Allergies   Allergen Reactions     Biaxin [Clarithromycin]      Codeine      Nabumetone      Penicillins      Seafood      Sulfa Drugs        MEDICATIONS:[MS1.1]  Reviewed from memory care facility records.   Aspirin 81 mg  Levothyroxine 88 mch  Acetaminophen 1000 mg q8h  Tramadol 25 mg bid  Vit C 1000 mg qd  Vitamin D3 1000 IU qd[MS1.3]    PHYSICAL EXAM:[MS1.1]   /89  Pulse 115  Temp 98.3  F (36.8  C) (Oral)  Resp 19  Ht 1.676 m (5' 6\")  SpO2 96%[MS1.5]  General: Alert,[MS1.1] anxious elderly white woman, lying in bed.[MS1.3]   HEENT: Normocephalic[MS1.1]. Ecchymosis and edema surrounding right eye; eyelids approximated due to edema.[MS1.3] Pupils symmetric and reactive. EOMs intact. Unable to participate in full CN evaluation.[MS1.6]  Spine:[MS1.1] No c/t/l spine ttp however pt has ttp at the base of her " skull.[MS1.3]  Chest wall: Symmetric thorax.   Respiratory: Non-labored breathing. Lung sounds clear to auscultation bilaterally.   Cardiovascular:[MS1.1] Tachycardia (up to 130s), regular[MS1.3] rhythm.   Gastrointestinal: Abdomen soft, non-distended,[MS1.1] mildly ttp in suprapubic area and RUQ.[MS1.3]   Extremities: Moving all four extremities. No limb deformities. No pedal edema.[MS1.1] Appropriate ROM. No bony tenderness. Ecchymosis at base of right thumb.[MS1.3]   Skin: As noted above.[MS1.1] 3 cm curvilinear laceration inferior to right eye - surgical glue in place.[MS1.3]     Data[MS1.1]     Results for orders placed or performed during the hospital encounter of 11/29/17 (from the past 24 hour(s))   INR   Result Value Ref Range    INR 1.04 0.86 - 1.14   CBC with platelets differential   Result Value Ref Range    WBC 8.8 4.0 - 11.0 10e9/L    RBC Count 4.06 3.8 - 5.2 10e12/L    Hemoglobin 12.4 11.7 - 15.7 g/dL    Hematocrit 38.5 35.0 - 47.0 %    MCV 95 78 - 100 fl    MCH 30.5 26.5 - 33.0 pg    MCHC 32.2 31.5 - 36.5 g/dL    RDW 13.1 10.0 - 15.0 %    Platelet Count 313 150 - 450 10e9/L    Diff Method Automated Method     % Neutrophils 72.3 %    % Lymphocytes 21.1 %    % Monocytes 4.8 %    % Eosinophils 0.9 %    % Basophils 0.3 %    % Immature Granulocytes 0.6 %    Nucleated RBCs 0 0 /100    Absolute Neutrophil 6.3 1.6 - 8.3 10e9/L    Absolute Lymphocytes 1.9 0.8 - 5.3 10e9/L    Absolute Monocytes 0.4 0.0 - 1.3 10e9/L    Absolute Eosinophils 0.1 0.0 - 0.7 10e9/L    Absolute Basophils 0.0 0.0 - 0.2 10e9/L    Abs Immature Granulocytes 0.1 0 - 0.4 10e9/L    Absolute Nucleated RBC 0.0    Troponin I   Result Value Ref Range    Troponin I ES 0.019 0.000 - 0.045 ug/L   Partial thromboplastin time   Result Value Ref Range    PTT 33 22 - 37 sec[MS1.7]       Studies:[MS1.1]  CT Chest/Abdomen/Pelvis w Contrast    (Results Pending)[MS1.7]     CT head - no ICH  CT c spine - degenerative change  CT max face - right  supraorbital fracture with displacement into the frontal sinus, findings concerning for peridontitis  Right shoulder xray - no acute pathology[MS1.3]     Revision History        User Key Date/Time User Provider Type Action    > MS1.6 11/30/2017 12:43 AM Sarita Bronson MD Resident Sign     MS1.4 11/29/2017  9:58 PM Sarita Bronson MD Resident Sign     MS1.7 11/29/2017  9:22 PM Sarita Bronson MD Resident Sign     MS1.5 11/29/2017  9:17 PM Sarita Bronson MD Resident      MS1.3 11/29/2017  9:06 PM Sarita Bronson MD Resident      MS1.2 11/29/2017  8:39 PM Sarita Bronson MD Resident      MS1.1 11/29/2017  7:42 PM Sarita Bronson MD Resident                      Discharge Summaries      Discharge Summaries by Becka Fulton APRN CNP at 12/1/2017  2:16 PM     Author:  Becka Fulton APRN CNP Service:  Trauma Author Type:  Nurse Practitioner    Filed:  12/1/2017  2:33 PM Date of Service:  12/1/2017  2:16 PM Creation Time:  12/1/2017  2:16 PM    Status:  Cosign Needed :  Becka Fulton APRN CNP (Nurse Practitioner)    Cosign Required:  Yes             [facility], [place]    Discharge Summary  Trauma Surgery Service    Date of Admission:  11/29/2017  Date of Discharge:[AK1.1]  12/1/2017[AK1.2]  Discharging Provider:[AK1.1] Becka Fulton CNP/ Dr. Harmon[AK1.2]  Date of Service (when I saw the patient):[AK1.1] 12/01/17[AK1.2]    Primary Provider: Juan Carlos Perez  Primary Care clinic: [facility] [address] [address]  Phone: [phone]  Fax number: [phone][AK1.1]     Discharge Diagnoses[AK1.3]   Active Problems:    Fall    Orbital wall fracture (H)  T 7 deformity and compression of unknown chronicity[AK1.2]      Hospital Course[AK1.3]   History of Present Illness:[AK1.1] Ms. Olga Zelaya is a 89-year-old woman with memory loss, hearing loss, Hypertesion, and Diabetes Mellitus type II who presents to  ED after a fall at her memory  care facility. The patient does not recall the fall however per her family, she was found down in the hallway of her facility. It appeared she was ambulating without the aide of her walker. It is uncertain if she had any associated loss of consciousness with the fall. She had significant periorbital ecchymosis and a facial laceration a the time of presentation. She has denied pain.    Right orbital wall fracture  The patient sustained the above injury as a result of fall.  She was seen by the Trauma Resource Nurse and injury prevention education was performed.  The mechanism of injury and factors contributing to the accident were discussed with the patient.  Strategies on how to prevent future accidents were reviewed.  The patient underwent tertiary examination to evaluate for additional injuries.  The systematic review did not find any other injuries. She was evaluated by the Oral Maxillofacial Thoracic Surgery service and her injuries were deemed non operable. She should maintain sinus precautions for 2 weeks including: No sucking through a straw, sneeze with mouth open and void blowing her nose. She is required to follow up in Oral and Maxillofacial Surgery clinic in 10-14 days.    Thoracic vertebrae # 7 deformity with compression:  Olga Zelaya was evaluated by Neurosurgery and managed non-operatively for her fractures. She was placed on spinal precautions and monitored with serial neurological examinations which remained stable. There was no indication for a TLSO brace at this time given patient's age, dementia, and the fact that she is asymptomatic. She denied pain during her hospital stay.[AK1.2]    Palliative Care Consult  The palliative care team was consulted to assist in the care and future life planning regarding the changes the above injuries have created. Often this sort of injury is a clear marker of general decline in the aged population.  During their time with the patient and family, these are  "the things they focused on this admission:[AK1.1] supportive services.[AK1.2]      Code Status[AK1.3]   Full Code[AK1.2]  Physical Exam   Temp: 98.4  F (36.9  C) Temp src: Oral BP: (!) 140/95 Pulse: 115 Heart Rate: 134 Resp: 20 SpO2: 97 % O2 Device: None (Room air) Oxygen Delivery: 2 LPM  Vitals:    11/30/17 0347 12/01/17 0255   Weight: 65.8 kg (145 lb 1 oz) 66.8 kg (147 lb 4.3 oz)[AK1.3]     Vital Signs with Ranges[AK1.2]  Temp:  [97.6  F (36.4  C)-98.4  F (36.9  C)] 98.4  F (36.9  C)  Pulse:  [] 115  Heart Rate:  [101-134] 134  Resp:  [17-20] 20  BP: (112-140)/(63-95) 140/95  SpO2:  [95 %-100 %] 97 %  I/O last 3 completed shifts:  In: 1690 [P.O.:390; I.V.:1300]  Out: 870 [Urine:870][AK1.3]    Constitutional: Lethargic but arouses to voice, no apparent distress  Eyes: Moderate periorbital ecchymosis, right eye swelling improved. Pupils equal and react briskly.  Respiratory: No increased work of breathing, good air exchange, clear to auscultation bilaterally, no crackles or wheezing.  Cardiovascular: Normal apical impulse, regular rate and rhythm, and no murmur noted.  GI: Normal bowel sounds, soft, non-distended, non-tender, no masses palpated  Lymph/Hematologic: No cervical lymphadenopathy   Genitourinary:  No urine to assess  Skin: Frail with moderate facial ecchymosis, facial laceration is well approximated, no drainage.  Musculoskeletal: There is no redness, warmth, or swelling of the joints.  Full range of motion noted.   Neurologic: Very hard of hearing at baseline, oriented to self.   Neuropsychiatric: Calm, normal eye contact, alert, normal affect[AK1.2]    Discharge Disposition[AK1.3]   Discharged to assisted living[AK1.2]  Condition at discharge:[AK1.1] Stable[AK1.2]  Discharge VS:[AK1.1] Blood pressure (!) 140/95, pulse 115, temperature 98.4  F (36.9  C), temperature source Oral, resp. rate 20, height 1.676 m (5' 6\"), weight 66.8 kg (147 lb 4.3 oz), SpO2 97 %.    Consultations This Hospital Stay "   OCCUPATIONAL THERAPY ADULT IP CONSULT  NEUROSURGERY ADULT IP CONSULT  ORTHOSIS BRACE IP CONSULT  PALLIATIVE CARE ADULT IP CONSULT    Discharge Orders     General info for SNF   Length of Stay Estimate: Long Term Care  Condition at Discharge: Stable  Level of care:board and care  Rehabilitation Potential: Fair  Admission H&P remains valid and up-to-date: Yes  Recent Chemotherapy: N/A  Use Nursing Home Standing Orders: Yes     Mantoux instructions   Give two-step Mantoux (PPD) Per Facility Policy Yes     Reason for your hospital stay   1. Right supraorbital fracture with displacement into frontal sinus  2. Facial laceration  3. T7 compression /deformity with unknown chronicity     Intake and output   Every shift     Wound care (specify)   Site:   Face  Instructions:  Clean daily with soap and water, no dressing needed     Follow Up and recommended labs and tests   Follow up with your primary care provider for continued medical care and hospital follow up in 5-10 days.     Oral and Maxillofacial Surgeons: Follow up in Oral and Maxillofacial Surgery clinic in 10-14 days  [address] [place] [id number]  Appointments: [phone]     Activity - Up with nursing assistance     DNR/DNI     Fall precautions     Advance Diet as Tolerated   Follow this diet upon discharge: Regular       Discharge Medications   Current Discharge Medication List      CONTINUE these medications which have NOT CHANGED    Details   calcium carbonate (TUMS) 500 MG chewable tablet Take 1 chew tab by mouth 4 times daily as needed for heartburn      ASPIRIN PO Take 81 mg by mouth daily      NITROGLYCERIN SL Place 0.4 mg under the tongue every 5 minutes as needed for chest pain      LEVOTHYROXINE SODIUM PO Take 88 mcg by mouth daily      ASCORBIC ACID PO Take 1,000 mg by mouth daily      ACETAMINOPHEN PO Take 1,000 mg by mouth 3 times daily      magnesium hydroxide (MILK OF MAGNESIA) 400 MG/5ML suspension Take 30  mLs by mouth daily as needed for constipation or heartburn      sennosides (SENOKOT) 8.6 MG tablet Take 1 tablet by mouth daily as needed for constipation      cholecalciferol (VITAMIN D) 1000 UNIT tablet Take 1 tablet by mouth daily.         STOP taking these medications       traMADol (ULTRAM) 50 MG tablet Comments:   Reason for Stopping:             Allergies   Allergies   Allergen Reactions     Biaxin [Clarithromycin]      Codeine      Nabumetone      Penicillins      Seafood      Sulfa Drugs      Data[AK1.3]   Most Recent 3 CBC's:[AK1.1]  Recent Labs   Lab Test  12/01/17   0536  11/30/17   1234  11/29/17 1948   WBC  5.5  6.6  8.8   HGB  9.9*  11.0*  12.4   MCV  97  96  95   PLT  241  257  313[AK1.3]      Most Recent 3 BMP's:[AK1.1]  Recent Labs   Lab Test  12/01/17   0536  11/30/17   1234  11/29/17   1402   NA  143  140  134   POTASSIUM  3.8  3.9  4.1   CHLORIDE  113*  107  98   CO2  23  25  27   BUN  14  14  20   CR  0.93  0.92  0.97   ANIONGAP  7  8  9   RADHA  8.2*  8.8  9.6   GLC  111*  118*  171*[AK1.3]     Most Recent 2 LFT's:[AK1.1]  Recent Labs   Lab Test  11/30/17   1234  02/19/12   2250   AST  14  35   ALT  9  13   ALKPHOS  55  78   BILITOTAL  0.6  1.1[AK1.3]     Most Recent INR's and Anticoagulation Dosing History:  Anticoagulation Dose History     Recent Dosing and Labs Latest Ref Rng & Units 2/19/2012 8/9/2013 11/29/2017    INR 0.86 - 1.14 1.05 1.00 1.04        Most Recent 3 Troponin's:[AK1.1]  Recent Labs   Lab Test  11/29/17   1948  04/27/17   0557  04/27/17   0040   TROPI  0.019  0.020  0.022[AK1.3]     Most Recent 6 Bacteria Isolates From Any Culture (See EPIC Reports for Culture Details):[AK1.1]No lab results found.[AK1.3]  Most Recent TSH, T4 and A1c Labs:[AK1.1]  Recent Labs   Lab Test  04/26/17   1725   TSH  3.75   A1C  7.3*[AK1.3]     Results for orders placed or performed during the hospital encounter of 11/29/17   CT Chest/Abdomen/Pelvis w Contrast     Value    Radiologist flags  Age-indeterminate progression of a T7 compression (Urgent)    Narrative    EXAMINATION: CT CHEST/ABDOMEN/PELVIS W CONTRAST, 11/29/2017 10:24 PM    TECHNIQUE:  Helical CT images from the thoracic inlet through the  symphysis pubis were obtained  with contrast. Contrast dose: 104    COMPARISON: Head CT from earlier on the same day, radiograph  4/26/2017, CT 1/8/2004.    HISTORY: Fall with cranial fracture, evaluate for other trauma.    FINDINGS:    Chest:   The mediastinal vasculature is intact. There is no pneumothorax or  pleural effusion. No suspected pulmonary contusion. Heart is mildly  enlarged without pericardial effusion. There are atherosclerotic  vascular calcifications no mediastinal or axillary lymphadenopathy.  The trachea and central airways are clear.    There are scattered consolidative opacities in the lungs, most notably  in the right middle and the bilateral lower lobes. The most focal of  these is an area of linear peribronchial vascular increased  attenuation with associated bronchiectasis in the medial right middle  lobe. There is mucous plugging in the lateral right middle lobe within  expanded peripheral bronchi. There is mild tree-in-bud nodularity in  the medial right upper lobe in general, these findings are new or  increased from the CT in 2004. There is an area of linear subpleural  fibrotic opacities with subpleural consolidation in the lateral right  lower lobe which is increased since the comparison in 2004. There is  posterior basilar fibroatelectasis.    Abdomen and pelvis:   No free air or free fluid collection. No evidence of acute traumatic  abnormality in the abdomen or pelvis. The hepatic parenchyma is within  normal limits. There is moderate intra and extrahepatic biliary ductal  dilation, increased from 2004, possibly representing reservoir effect  and age-related changes. Spleen is small and otherwise within normal  limits. The pancreas is diffusely atrophic. There is a 1.1 x  0.8 cm  cyst adjacent to or within the tail of the pancreas (series 4, image  116), stable since 2004. The stomach and small bowel are within normal  limits. There is extensive distal colonic diverticulosis without focal  inflammatory changes to suggest diverticulitis. Diffuse renal cortical  thinning with scattered areas of focal cortical thinning. No  hydronephrosis or nephrolithiasis. No abnormal lymph nodes identified.  Concentric vascular calcifications in the aorta and major branch  vessel ostia, without obvious focal stenosis.    Bones and soft tissues:   There is a compression deformity of the T7 vertebral body with  approximately 50% loss of the vertebral body height anteriorly, which  appears progressed from the chest radiograph on 4/26/2017. There are  additional Schmorl's node deformities, some of which may mimic minimal  endplate compression deformities. Otherwise there are diffuse chronic  degenerative changes. Motion artifact in the mid thorax mimics a  sternal fracture, and slightly limits evaluation for small  nondisplaced fractures.      Impression    IMPRESSION:   1. Progression of a compression deformity of the T7 vertebral body  since 4/26/2017 with approximately 50% loss of vertebral body height  anteriorly. This could be a chronic or acute on chronic fracture.  There are no significant retropulsed fragments or associated spinal  canal narrowing.  2. Patchy linear pulmonary opacities with bronchiectasis and mucous  plugging, as well as a few areas of tree-in-bud nodularity. The  majority of the findings likely represent chronic scarring from prior  infection, although the tree-in-bud nodularity is compatible with  acute inflammation or infection.  3. Moderate intra and extrahepatic biliary ductal dilation has  progressed since 2004. This is most likely due to a combination of  age-related changes and reservoir effect following cholecystectomy.  4. Indeterminate cystic lesion of the tail of the  pancreas, stable  since 2004. This most likely represents a benign cyst, possible  sequelae from prior infection or inflammation. Cystic pancreatic  neoplasm is considered much less likely given the stability over  greater than a decade.  5. Diffuse renal cortical thinning and focal cortical scarring  compatible with sequela of prior infectious or ischemic insult.      [Urgent Result: Age-indeterminate progression of a T7 compression  deformity]    Finding was identified on 11/29/2017 10:27 PM.     Dr. Bronson was contacted by Dr. Mejias at 11/29/2017 11:03 PM and  verbalized understanding of the urgent finding.          I have personally reviewed the examination and initial interpretation  and I agree with the findings.    CARRI LAUGHLIN MD[AK1.1]       Time Spent on this Encounter[AK1.3]   I, Becka Fulton, personally saw the patient today and spent less than or equal to 30 minutes discharging this patient.[AK1.2]      We appreciate the opportunity to care for your patient while in the hospital.  Should you have any questions about their injuries or this discharge summary our contact information is below.    Trauma Services  [facility]   Department of Critical Care and Acute Care Surgery  [address] [place] [id number]  Office: [phone][AK1.1]       Revision History        User Key Date/Time User Provider Type Action    > AK1.3 12/1/2017  2:33 PM Becka Fulton APRN CNP Nurse Practitioner Sign     AK1.2 12/1/2017  2:17 PM Becka Fulton APRN CNP Nurse Practitioner      AK1.1 12/1/2017  2:16 PM Becka Fulton APRN CNP Nurse Practitioner                      Consult Notes      Consults by Isa Antonio APRN CNP at 11/30/2017  1:23 PM     Author:  Isa Antonio APRN CNP Service:  Neurosurgery Author Type:  Nurse Practitioner    Filed:  11/30/2017  4:45 PM Date of Service:  11/30/2017  1:23 PM Creation Time:  11/30/2017  1:23 PM     "Status:  Signed :  Isa Antonio APRN CNP (Nurse Practitioner)     Consult Orders:    1. Neurosurgery Adult IP Consult: Patient to be seen: Routine within 24 hrs; Call back #: [id number] or trauma; 89 Y fell on her face yesterday has a T7 compression/deformity, age unknown, denies pain; Consultant may enter orders: Yes [[id number]] ordered by Becka Fulton APRN CNP at 11/30/17 1004                [facility]  NEUROSURGERY CONSULTATION NOTE    This consultation was requested by the Trauma Service.    Reason for Consultation: T7 Compression Fracture -- Acute vs Chronic    HPI: Olga Zelaya is a sweet 89 year old female whose past medical history is significant for Diabetes, Hypothyroidism, Hyperlipidemia, and Dementia. She resides in a Memory Care Facility. The patient was transferred to [facility] Emergency Department from [facility] Emergency Department for escalation of care. The patient reportedly had suffered a fall at her memory care facility after she was found \"down\" by staff members.  The patient is a poor historian in the setting of her dementia and therefore the majority of this HPI was completed via Chart Review. Today, the Neurosurgery Service was consulted for evaluation of a T7 Compression Fracture that was identified on CT Chest/Abdomen/Pelvis. It is uncertain whether this is a new fracture, however, according to the Radiology findings, there appears to be an acute component.     Upon questioning, the patient denies back discomfort, upper or lower extremity weakness, numbness or tingling, gait instability, or loss of bowel or bladder control.         PAST MEDICAL HISTORY:   Past Medical History:   Diagnosis Date     Dementia      Diabetes mellitus (H)      High cholesterol      Hypothyroid        PAST SURGICAL HISTORY:   Past Surgical History:   Procedure Laterality Date     APPENDECTOMY       BIOPSY " "ARTERY TEMPORAL Left 4/28/2017    Procedure: BIOPSY ARTERY TEMPORAL;  LEFT TEMPORAL ARTERY BIOPSY.;  Surgeon: Jem Jaramillo MD;  Location: [facility] OR     CHOLECYSTECTOMY         FAMILY HISTORY:   Family History   Problem Relation Age of Onset     DIABETES Mother        SOCIAL HISTORY:   Social History   Substance Use Topics     Smoking status: Never Smoker     Smokeless tobacco: Not on file     Alcohol use No       MEDICATIONS:  No current outpatient prescriptions on file.       Allergies:  Allergies   Allergen Reactions     Biaxin [Clarithromycin]      Codeine      Nabumetone      Penicillins      Seafood      Sulfa Drugs          ROS: 10 point ROS of systems including Constitutional, Eyes, Respiratory, Cardiovascular, Gastroenterology, Genitourinary, Integumentary, Muscularskeletal, Psychiatric were all negative except for pertinent positives noted in my HPI.    EXAM:  /60 (BP Location: Right arm)  Pulse 141  Temp 98.1  F (36.7  C) (Oral)  Resp 18  Ht 1.676 m (5' 6\")  Wt 65.8 kg (145 lb 1 oz)  SpO2 98%  BMI 23.41 kg/m2  Neuro:[SS1.1]   General: Lying in Bed; Pleasant; In No Apparent Distress; Hard of Hearing  Mental Status: Oriented to Self; Short Term Memory Loss; Forgetful; Disoriented to Place, Time and Situation; Follows Commands  Speech: Clear; No Dysarthria or Aphasia  Motor: 5/5 in BUE; 5/5 in BLE  Sensory: Intact to LT  Gait: Deferred  No focal tenderness along spinal column[SS1.2]    LABS/IMAGING:[SS1.1]  Lab Results   Component Value Date    WBC 6.6 11/30/2017     Lab Results   Component Value Date    RBC 3.64 11/30/2017     Lab Results   Component Value Date    HGB 11.0 11/30/2017     Lab Results   Component Value Date    HCT 35.0 11/30/2017     No components found for: MCT  Lab Results   Component Value Date    MCV 96 11/30/2017     Lab Results   Component Value Date    MCH 30.2 11/30/2017     Lab Results   Component Value Date    MCHC 31.4 11/30/2017     Lab Results   Component Value " Date    RDW 13.3 11/30/2017     Lab Results   Component Value Date     11/30/2017     Last Basic Metabolic Panel:  Lab Results   Component Value Date     11/30/2017      Lab Results   Component Value Date    POTASSIUM 3.9 11/30/2017     Lab Results   Component Value Date    CHLORIDE 107 11/30/2017     Lab Results   Component Value Date    RADHA 8.8 11/30/2017     Lab Results   Component Value Date    CO2 25 11/30/2017     Lab Results   Component Value Date    BUN 14 11/30/2017     Lab Results   Component Value Date    CR 0.92 11/30/2017     Lab Results   Component Value Date     11/30/2017[SS1.2]       CT Chest/Abdomen/Pelvis w Contrast   Final Result   Abnormal   IMPRESSION:    1. Progression of a compression deformity of the T7 vertebral body   since 4/26/2017 with approximately 50% loss of vertebral body height   anteriorly. This could be a chronic or acute on chronic fracture.   There are no significant retropulsed fragments or associated spinal   canal narrowing.   2. Patchy linear pulmonary opacities with bronchiectasis and mucous   plugging, as well as a few areas of tree-in-bud nodularity. The   majority of the findings likely represent chronic scarring from prior   infection, although the tree-in-bud nodularity is compatible with   acute inflammation or infection.   3. Moderate intra and extrahepatic biliary ductal dilation has   progressed since 2004. This is most likely due to a combination of   age-related changes and reservoir effect following cholecystectomy.   4. Indeterminate cystic lesion of the tail of the pancreas, stable   since 2004. This most likely represents a benign cyst, possible   sequelae from prior infection or inflammation. Cystic pancreatic   neoplasm is considered much less likely given the stability over   greater than a decade.   5. Diffuse renal cortical thinning and focal cortical scarring   compatible with sequela of prior infectious or ischemic insult.          [Urgent Result: Age-indeterminate progression of a T7 compression   deformity]      Finding was identified on 11/29/2017 10:27 PM.       Dr. Bronson was contacted by Dr. Mejias at 11/29/2017 11:03 PM and   verbalized understanding of the urgent finding.              I have personally reviewed the examination and initial interpretation   and I agree with the findings.      CARRI LAUGHLIN MD        ASSESSMENT:[SS1.1] Ms. Zelaya is a very pleasant 89 year old female with Dementia who is s/p Fall who was found to have an interval worsening T7 Compression Fracture without compromise of the Spinal Canal. She appears to be asymptomatic from the fracture at this time.[SS1.2]     RECOMMENDATIONS:[SS1.1]  - No Neurosurgical Intervention Recommended at this time  -[SS1.2] No indication for TLSO brace at this time given patient's age, dementia, and the fact that she is asymptomatic[SS1.3]  - Recommend management of Osteoporosis  - Recommend PT/OT Evaluations[SS1.2]  - Neurosurgery will sign-off at this time[SS1.3]    Isa Antonio, SOPHIE-BC, CCRN, CNRN  Department of Neurosurgery  Pager: [phone]    The patient was discussed and reviewed with Chief Neurosurgery Resident, Dr. Bruno Lee,[SS1.2] and staff Neurosurgeon, Dr. Joe Sinha[SS1.3] who[SS1.2] are[SS1.3] in agreement with the plan of care as outlined above. Please do not hesitate to call with questions or concerns.[SS1.2]                Revision History        User Key Date/Time User Provider Type Action    > SS1.3 11/30/2017  4:45 PM Isa Antonio APRN CNP Nurse Practitioner Sign     SS1.2 11/30/2017  1:50 PM Isa Antonio APRN CNP Nurse Practitioner Share     SS1.1 11/30/2017  1:33 PM Isa Antonio APRN CNP Nurse Practitioner Share                     Progress Notes - Physician (Notes for yesterday and today)      Progress Notes by Gray Madison MD at 12/1/2017  4:36 AM     Author:  Gray Madison MD Service:  Trauma Author  Type:  Resident    Filed:  2017  4:45 AM Date of Service:  2017  4:36 AM Creation Time:  2017  4:36 AM    Status:  Signed :  Gray Madison MD (Resident)         Abnormalities on rhythm strip. ECG shows new Mobitz type 1 with sinus tachycardia. She has had prior 1st degree AV block on ECG. K 3.9 and Mg 1.7 yesterday, not repleted. Ordered 2g Mg sulfate. Stat BMP, Mg, Phos, CBC. Replete electrolytes K>4, Mg>2, P>3. TKO IV (currently running at 100cc/hr).    Gray Madison MD  Trauma[YM1.1]     Revision History        User Key Date/Time User Provider Type Action    > YM1.1 2017  4:45 AM Gray Madison MD Resident Sign            Progress Notes by Doug Cortes MD at 2017  9:51 PM     Author:  Doug Cortes MD Service:  Oral & Maxillofacial Surgery Author Type:  Resident    Filed:  2017  6:23 AM Date of Service:  2017  9:51 PM Creation Time:  2017  9:50 PM    Status:  Attested :  Doug Cortes MD (Resident)    Cosigner:  Jacob Lundberg DDS at 2017  8:26 AM        Attestation signed by Jacob Lundberg DDS at 2017  8:26 AM        I have reviewed the patient, findings and radiographs with the resident. I agree with the assessment and plan of care. Please see above note for full details.     Jacob Lundberg DDS, MD  Clinical , Oral and Maxillofacial Surgery                               ORAL & MAXILLOFACIAL SURGERY CONSULTATION   Name: Olga Zelaya  MRN: [id number]  : 1923  Date of Service:[DM1.1] 2017[DM1.2]    OMFS consulted by ED regarding facial trauma.    ASSESSMENT:  89 year old female with right supra orbital rim fracture superiorly displaced into frontal sinus without entrapment or other facial fractures noted[DM1.1] also with approximately 2.5 cm laceration overlying the lateral inferior infra orbital rim extending inferiorly previously repaired[DM1.3] s/p unwitnessed fall.    RECOMMENDATIONS:  1. No  "surgical intervention recommended at this time  2. Sinus precautions for 2 weeks including:   - No sucking through a straw   - Sneeze with mouth open   - Avoid blowing nose  3. Pain management by primary team  4. Follow up in Oral and Maxillofacial Surgery clinic in 10-14 days    The patient's case was discussed with Chief Resident, Dr. Blanton who will discuss case with staff surgeon, Dr. Lundberg.     CHIEF COMPLAINT: \"I fell and hit my head\"    HISTORY OF PRESENT ILLNESS:      89 year old female who presents to the ED following a fall at patient's memory care facility. She states that she cannot remember the incident but patient's daughter stated that patient attempted to walk without aid of her walker. Patient does not remember incident and is unsure if she lost conciousness.    Patient was initially taken to [facility] ED where facial laceration repair with dermabond. Patient states that she has pain surrounding her right eye in the area of the approximately 2.5 cm laceration and also over the supra orbital rim near the area of the known fracture. She states that it is difficult to open her eye[DM1.1] fully[DM1.4] due to swelling.       PAST MEDICAL HISTORY:[DM1.1]  Past Medical History:   Diagnosis Date     Dementia      Diabetes mellitus (H)      High cholesterol      Hypothyroid[DM1.2]      PAST SURGICAL HISTORY:[DM1.1]  Past Surgical History:   Procedure Laterality Date     APPENDECTOMY       BIOPSY ARTERY TEMPORAL Left 4/28/2017    Procedure: BIOPSY ARTERY TEMPORAL;  LEFT TEMPORAL ARTERY BIOPSY.;  Surgeon: Jem Jaramillo MD;  Location: [facility] OR     CHOLECYSTECTOMY[DM1.2]       PTA MEDICATIONS:[DM1.1]  No current facility-administered medications for this encounter.      Current Outpatient Prescriptions   Medication     traMADol (ULTRAM) 50 MG tablet     ASPIRIN PO     NITROGLYCERIN SL     LEVOTHYROXINE SODIUM PO     ASCORBIC ACID PO     ACETAMINOPHEN PO     ISOSORBIDE MONONITRATE PO     mineral oil enema " "    magnesium hydroxide (MILK OF MAGNESIA) 400 MG/5ML suspension     sennosides (SENOKOT) 8.6 MG tablet     cholecalciferol (VITAMIN D) 1000 UNIT tablet[DM1.2]          ALLERGIES:[DM1.1]     Allergies   Allergen Reactions     Biaxin [Clarithromycin]      Codeine      Nabumetone      Penicillins      Seafood      Sulfa Drugs[DM1.2]         FAMILY HISTORY:[DM1.1]  Family History   Problem Relation Age of Onset     DIABETES Mother[DM1.2]      SOCIAL HISTORY  Tobacco: daughter denies  Alcohol:daughter denies  Illicit drugs: daughter denies[DM1.1]  Social History     Social History     Marital status:      Spouse name: N/A     Number of children: N/A     Years of education: N/A     Occupational History     Not on file.     Social History Main Topics     Smoking status: Never Smoker     Smokeless tobacco: Not on file     Alcohol use No     Drug use: No     Sexual activity: Not on file     Other Topics Concern     Not on file     Social History Narrative[DM1.2]     REVIEW OF SYSTEMS:  10 point review of systems completed with help of patient's daughter and found to be negative except except as noted in HPI.     OBJECTIVE/PHYSICAL EXAMINATION:  Vitals:[DM1.1] Blood pressure 128/73, pulse 115, temperature 98.3  F (36.8  C), temperature source Oral, resp. rate 20, height 1.676 m (5' 6\"), SpO2 95 %.[DM1.2]  Constitutional: Alert, anxious, mildly distressed  HEENT: There are signs of external trauma, including ecchymosis surrounding right eye and previously repaired (glued with dermabond) approximately 2.5 cm laceration overlying the lateral inferior infraorbital rim area.       Ears: External ears are intact and atraumatic, tympanic membranes intact bilaterally      Eyes: Pupils equal round and reactive to light, Extraocular eye movement intact, no entrapment, no scleral hemmorhage      Nose: External alae are normal, there is no hemorrhage, septum midline, no septal hematoma, no crepitus/deviation of the nasal dorsum   "    Mouth:   The buccal vestibules are non-tender to palpation. The dentition is in moderate disrepair including a fractured tooth #11. Unknown if tooth fractured during fall, but buccal mucosa intact without signs of abrasion or intraoral trauma/ lacerations. Maxilla nonmobile on exam, bilateral compression and flexion of Mandible does not elicit painful response or movement.  No blood in saliva. Floor of mouth soft nontender, nondistended. Tongue is WNL , Uvula is midline, no lateral pharyngeal swelling. Mucosal membranes are dry, Oral Hygiene is fair to poor with significant plaque noted. TMJ non-tender bilaterally.  Neck: Soft, supple, no lymphadenopathy. Cervical collar not present.  Cardiovascular: tachycardic, no murmurs appreciated  Pulmonary: CTAB  Extemities: There are not signs of external trauma, pt moves all extremities equally  Neurologic: EOMI, PERRL, facial sensation/movement symmetric (V2/V3 intact), hearing loss present, uvula midline, tongue midline on protrusion.  Skin: clean, intact, with previously repaired 2.5 cm facial laceration     LABORATORY, PATHOLOGY, AND RADIOLOGY DATA:  Lab results:   CBC RESULTS:   Recent Labs   Lab Test  11/29/17 1948   WBC  8.8   RBC  4.06   HGB  12.4   HCT  38.5   MCV  95   MCH  30.5   MCHC  32.2   RDW  13.1   PLT  313       Last Basic Metabolic Panel:  Lab Results   Component Value Date     11/29/2017      Lab Results   Component Value Date    POTASSIUM 4.1 11/29/2017     Lab Results   Component Value Date    CHLORIDE 98 11/29/2017     Lab Results   Component Value Date    RADHA 9.6 11/29/2017     Lab Results   Component Value Date    CO2 27 11/29/2017     Lab Results   Component Value Date    BUN 20 11/29/2017     Lab Results   Component Value Date    CR 0.97 11/29/2017     Lab Results   Component Value Date     11/29/2017     Imaging:  CT SCAN OF THE FACE WITHOUT CONTRAST 11/29/2017 3:39 PM     IMPRESSION (Radiology read):   1. Right supraorbital rim  fracture with superior displacement of the  fracture fragment into the frontal sinus. There is orbital fat that  extends into the fracture.  2. Right preorbital soft tissue swelling.  3. No other facial bone fractures identified.  4. Small hemorrhage layer within the right maxillary sinus.  5. Odontogenic disease.     SIGNATURE:  Doug Cortes DDS  Oral & Maxillofacial Surgery Resident, PGY-1  [id number][DM1.1]     Revision History        User Key Date/Time User Provider Type Action    > DM1.3 2017  6:23 AM Doug Cortes MD Resident Sign     DM1.4 2017 10:48 PM Doug Cortes MD Resident Sign     DM1.2 2017 10:46 PM Doug Cortes MD Resident Sign     DM1.1 2017  9:50 PM Doug Cortes MD Resident             ED Provider Notes by Miles Hester MD at 2017  7:06 PM     Author:  Miles Hester MD Service:  Emergency Medicine Author Type:  Physician    Filed:  2017  1:33 AM Date of Service:  2017  7:06 PM Creation Time:  2017  7:11 PM    Status:  Signed :  Miles Hester MD (Physician)             [place] EMERGENCY DEPARTMENT ([facility])  17[CT1.1]   History[CT1.2]     Chief Complaint   Patient presents with     Trauma[KM1.1]     The history is provided by a relative, medical records and the patient.[CT1.1]     Olga Zelaya is a 89 year old female[CT1.2] with a medical history significant for dementia, type 2 diabetes mellitus, hypertension and hyperlipidemia who presents to the Emergency Department for evaluation post fall. The patient was in her apartment today when she had an unwitnessed fall. She is not able to remember what happened; and does not know if she tripped or lost consciousness. The patient was taken to [facility] Emergency Department and evaluated. She had a CT imaging performed and found to have a fracture of the right superior orbital rim with extension into the right frontal sinus. The patient was then  transferred here. The patient here reports she does not remember the fall. Patient has significant swelling and bruising of the right eye, and suffered a laceration under the right eye. She denies any neck pain, and denies any current headache. Patient normally wears glasses, and was wearing them when she fell. Patient is not currently on any anticoagulation medications    CT SCAN OF THE FACE WITHOUT CONTRAST 11/29/2017  IMPRESSION:   1. Right supraorbital rim fracture with superior displacement of the  fracture fragment into the frontal sinus. There is orbital fat that  extends into the fracture.  2. Right preorbital soft tissue swelling.  3. No other facial bone fractures identified.  4. Small hemorrhage layer within the right maxillary sinus.  5. Odontogenic disease.    CT SCAN OF THE HEAD WITHOUT CONTRAST   11/29/2017  IMPRESSION:     1. No evidence of acute intracranial hemorrhage, mass, or herniation.  2. There is generalized atrophy of the brain. White matter changes are  present in the cerebral hemispheres that are consistent with small  vessel ischemic disease in this age patient.   3. Small fluid layer within the right maxillary sinus. Right  preorbital soft tissue swelling. Please see dedicated facial bone CT  for further details.    CT CERVICAL SPINE WITHOUT CONTRAST November 29, 2017  IMPRESSION:   1. No evidence of acute fracture or subluxation in the cervical spine.  2. Multilevel degenerative changes throughout the cervical spine.     XR SHOULDER RT G/E 3 VW 11/29/2017   IMPRESSION: No fracture or dislocation identified in the right  shoulder. Moderate right acromioclavicular degenerative change. No  significant soft tissue swelling.[CT1.1]    I have reviewed the Medications, Allergies, Past Medical and Surgical History, and Social History in the Epic system.[CT1.2]    Review of Systems   Constitutional: Negative for fever.   HENT: Positive for facial swelling (around right eye) and[CT1.1] hearing  "loss (chronic)[KM1.2]. Negative for congestion and[CT1.1] dental problem[KM1.2].         Positive for pain around right eye   Eyes: Positive for[CT1.1] visual disturbance (right eye swelling)[KM1.2]. Negative for[CT1.1] pain[KM1.2] and redness.   Respiratory: Negative for shortness of breath.    Cardiovascular: Negative for chest pain.   Gastrointestinal: Positive for[CT1.1] nausea[KM1.2]. Negative for abdominal pain and[CT1.1] vomiting[KM1.2].   Genitourinary: Negative for difficulty urinating.   Musculoskeletal: Negative for arthralgias,[CT1.1] back pain[KM1.2], neck pain and neck stiffness.   Skin: Positive for color change (bruising around right eye) and wound (laceartion below right eye).   Allergic/Immunologic: Negative for[CT1.1] immunocompromised state[KM1.2].   Neurological: Negative for[CT1.1] weakness[KM1.2] and headaches. Syncope:[CT1.1] unclear.[KM1.2]   Hematological:[CT1.1] Does not bruise/bleed easily[KM1.2].   Psychiatric/Behavioral: Positive for[CT1.1] decreased concentration[KM1.2] and[CT1.1] dysphoric mood[KM1.2]. Negative for confusion. The patient[CT1.1] is nervous/anxious[KM1.2].[CT1.1]    All other systems reviewed and are negative[KM1.2].[CT1.1]      Physical Exam[CT1.2]   BP: (!) 146/104  Pulse: 115  Heart Rate: 114  Temp: 98.3  F (36.8  C)  Resp: 27  Height: 167.6 cm (5' 6\")  SpO2: 98 %[KM1.1]      Physical Exam   Constitutional: She is oriented to person, place, and time. She appears well-developed and well-nourished. She appears[CT1.1] distressed[KM1.2].[CT1.1]   Patient here with daughter granddaughter and great-granddaughter also.  Patient hard of hearing[KM1.2]   HENT:   Head: Normocephalic.[CT1.1]   right periorbital ecchymoses with swelling unable to open eye.  Inferior laceration repaired with Dermabond by Karthik wilkinson[KM1.2]   Eyes: No scleral icterus.   Neck: Normal range of motion. Neck supple.[CT1.1]   Slight posterior tenderness noted[KM1.2]   Cardiovascular:[CT1.1] Regular " rhythm[KM1.2].[CT1.1]    Tachycardia[KM1.2]   Pulmonary/Chest: No[CT1.1] stridor[KM1.2]. No[CT1.1] respiratory distress[KM1.2].   Abdominal: She exhibits[CT1.1] no distension[KM1.2] and[CT1.1] no mass[KM1.2]. There is[CT1.1] tenderness (mild)[KM1.2]. There is[CT1.1] no rebound[KM1.2] and[CT1.1] no guarding[KM1.2].   Musculoskeletal: She exhibits no[CT1.1] edema[KM1.2],[CT1.1] tenderness[KM1.2] or[CT1.1] deformity[KM1.2].   Neurological: She is alert and oriented to person, place, and time. She has[CT1.1] normal reflexes[KM1.2].   Skin: Skin is warm and dry. No rash noted. She is not diaphoretic. No erythema. No pallor.   Psychiatric:[CT1.1]   Patient mildly anxious here hard of hearing[KM1.2]   Nursing note and vitals reviewed.[CT1.1]      ED Course   7:11 PM  The patient was seen and examined by[CT1.2] Miles Hester MD[CT1.1] in Room[CT1.2] ED23[CT1.1].[CT1.2]     ED Course[KM1.1]     Patient evaluated in ER.  Patient seen by trauma.  Also OMFS was on for facial trauma admitted to the patient ER also.    Maxillary facial surgery did not recommend any other type of intervention such as antibiotics currently at this point.    Agency by trauma also.  We did do a CT scan of chest ab and pelvis with some mild tachycardia she was uncomfortable did receive 12.5  g of IV fentanyl with some improvement.    CT scan shows age-indeterminate T7 fracture without retropulsion.  No other acute findings noted.  Trauma did see the patient and will admit to their service.    OMFS to follow also.[KM1.2]        Procedures             Critical Care time:[CT1.2]  none  Trauma:  Level of trauma activation: Trauma evaluation (consult) called at 1910  C-collar and immobilization: not indicated, cleared.  CSpine Clearance: by [employer] Criteria  GCS at arrival: 15  GCS at disposition: unchanged  Full Primary and Secondary survey with appropriate immobilization of spine completed in exam section.  Consults prior to admission or transfer: OMFS  called at 1928  Procedures done in the ED: CT chest ab pelvis[KM1.2]          Labs Ordered and Resulted from Time of ED Arrival Up to the Time of Departure from the ED   INR   CBC WITH PLATELETS DIFFERENTIAL   TROPONIN I   PARTIAL THROMBOPLASTIN TIME     Results for orders placed or performed during the hospital encounter of 11/29/17   CT Chest/Abdomen/Pelvis w Contrast   Result Value Ref Range    Radiologist flags Age-indeterminate progression of a T7 compression (Urgent)     Narrative    PRELIM  1. Progression of a compression deformity of the T7 vertebral body  since 4/26/2017 with approximately 50% loss of vertebral body height  anteriorly. This could be a chronic or acute on chronic fracture.  There there are no retropulsed fragments or associated spinal canal  narrowing.  2. Patchy linear pulmonary opacities with bronchiectasis and mucous  plugging, as well as a few areas of tree-in-bud nodularity. The  majority of the findings likely represent chronic scarring from prior  infection, although the tree-in-bud nodularity is compatible with  acute inflammation or infection.  3. Moderate intra and extrahepatic biliary ductal dilation has  progressed since 2004. This is most likely due to a combination of  age-related changes and reservoir effect following cholecystectomy.  4. Indeterminate cystic lesion of the tail of the pancreas, stable  since 2004. This most likely represents a benign cyst, possible  sequelae from prior infection or inflammation. Cystic pancreatic  neoplasm is considered much less likely given the stability over  greater than a decade.  5. Diffuse renal cortical thinning and focal cortical scarring  compatible with sequela of prior infectious or ischemic insult.      [Urgent Result: Age-indeterminate progression of a T7 compression  deformity]    Finding was identified on 11/29/2017 10:27 PM.     Dr. Bronson was contacted by Dr. Mejias at 11/29/2017 11:03 PM and  verbalized understanding of the  urgent finding.         INR   Result Value Ref Range    INR 1.04 0.86 - 1.14   CBC with platelets differential   Result Value Ref Range    WBC 8.8 4.0 - 11.0 10e9/L    RBC Count 4.06 3.8 - 5.2 10e12/L    Hemoglobin 12.4 11.7 - 15.7 g/dL    Hematocrit 38.5 35.0 - 47.0 %    MCV 95 78 - 100 fl    MCH 30.5 26.5 - 33.0 pg    MCHC 32.2 31.5 - 36.5 g/dL    RDW 13.1 10.0 - 15.0 %    Platelet Count 313 150 - 450 10e9/L    Diff Method Automated Method     % Neutrophils 72.3 %    % Lymphocytes 21.1 %    % Monocytes 4.8 %    % Eosinophils 0.9 %    % Basophils 0.3 %    % Immature Granulocytes 0.6 %    Nucleated RBCs 0 0 /100    Absolute Neutrophil 6.3 1.6 - 8.3 10e9/L    Absolute Lymphocytes 1.9 0.8 - 5.3 10e9/L    Absolute Monocytes 0.4 0.0 - 1.3 10e9/L    Absolute Eosinophils 0.1 0.0 - 0.7 10e9/L    Absolute Basophils 0.0 0.0 - 0.2 10e9/L    Abs Immature Granulocytes 0.1 0 - 0.4 10e9/L    Absolute Nucleated RBC 0.0    Troponin I   Result Value Ref Range    Troponin I ES 0.019 0.000 - 0.045 ug/L   Partial thromboplastin time   Result Value Ref Range    PTT 33 22 - 37 sec         Consults  Other (Comment): Called (Facial Trauma) (11/29/17 1928)[KM1.1]    Assessments & Plan (with Medical Decision Making)[CT1.2]  89-year-old female who had supposedly a mechanical fall earlier today evaluated at [facility] who sustained a right periorbital injury with the superior rim fracture with some displacement of the fracture into the sinus.  Patient is not on anticoagulants head CT and CT C-spine does not show acute findings.  Patient transferred to the ER for evaluation by trauma along with this oral maxillofacial surgery did see the patient.  No acute intervention recommended CT scan of chest abdomen pelvis were done with age-indeterminate T7 fracture.  Patient otherwise stable did receive fentanyl ×1 IV dose in the ER patient otherwise stable will be admitted to trauma service overnight.  No antibodies were indicated or recommended  per oral surgery but consider clindamycin IV per trauma recommendation.[KM1.2]           I have reviewed the nursing notes.    I have reviewed the findings, diagnosis, plan and need for follow up with the patient.[CT1.2]    Current Discharge Medication List          Final diagnoses:   Fall   Closed fracture of seventh thoracic vertebra, unspecified fracture morphology, sequela   Closed fracture of orbit, initial encounter (H)   Facial laceration, initial encounter   Other specified hearing loss of both ears[KM1.1]     I, Samuel Cowan, am serving as a trained medical scribe to document services personally performed by Miles Hester MD, based on the provider's statements to me.   IMiles MD, was physically present and have reviewed and verified the accuracy of this note documented by Samuel Cowan.[CT1.1]    11/29/2017   [facility], [place], EMERGENCY DEPARTMENT[CT1.2]        This note was created at least in part by the use of dragon voice dictation system. Inadvertent typographical errors may still exist.  Miles Hester MD.[KM1.2]         Miles Hester MD  11/30/17 0133  [KM1.1]     Revision History        User Key Date/Time User Provider Type Action    > KM1.1 11/30/2017  1:33 AM Miles Hester MD Physician Sign     KM1.2 11/30/2017 12:58 AM Miles Hester MD Physician      CT1.1 11/29/2017  7:26 PM Samuel Cowan     CT1.2 11/29/2017  7:15 PM Samuel Cowan                  Procedure Notes     No notes of this type exist for this encounter.         Progress Notes - Therapies (Notes from 11/28/17 through 12/01/17)      Progress Notes by Cristobal Miranda PT at 11/30/2017  4:15 PM     Author:  Cristobal Miranda PT Service:  (none) Author Type:  Physical Therapist    Filed:  11/30/2017  4:15 PM Date of Service:  11/30/2017  4:15 PM Creation Time:  11/30/2017  4:15 PM    Status:  Signed :  Cristobal Miranda PT (Physical Therapist)          11/30/17 [place]   Quick  Adds   Type of Visit Initial PT Evaluation   Living Environment   Lives With facility resident   Living Arrangements assisted living  (memory care)   Home Accessibility no concerns   Number of Stairs to Enter Home 0   Number of Stairs Within Home 0   Transportation Available family or friend will provide;car   Living Environment Comment Facility provides 24 hour assistance as necessary   Self-Care   Usual Activity Tolerance moderate   Current Activity Tolerance moderate   Regular Exercise yes   Activity/Exercise Type other (see comments)  (PT/OT)   Exercise Amount/Frequency 2 times/wk   Equipment Currently Used at Home walker, standard;grab bar;shower chair   Activity/Exercise/Self-Care Comment Hobbies: crossword puzzels   Functional Level Prior   Ambulation 1-->assistive equipment   Transferring 1-->assistive equipment   Toileting 1-->assistive equipment   Bathing 3-->assistive equipment and person   Dressing 1-->assistive equipment   Eating 0-->independent   Communication 0-->understands/communicates without difficulty  (very hard of hearing)   Swallowing 0-->swallows foods/liquids without difficulty   Cognition 1 - attention or memory deficits   Fall history within last six months yes   Number of times patient has fallen within last six months 2   Which of the above functional risks had a recent onset or change? fall history   Prior Functional Level Comment Pt lives at assisted living facility on memory care unit, has assistance with ADLs as necessary and uses a FWW at baseline   General Information   Onset of Illness/Injury or Date of Surgery - Date 11/29/17   Patient/Family Goals Statement Return to prior living arrangements   Pertinent History of Current Problem (include personal factors and/or comorbidities that impact the POC) Pt is a 89-year-old woman with memory loss, hearing loss, HTN, HL, and DM2 who presents to [facility] ED from [facility] after a fall at her memory care facility. The patient does not recall  the fall however per her family, she was found down in the hallway of her facility. It appeared she was ambulating without the aide of her walker. She sustained a right supraorbital fracture with displacement into frontal sinus and facial lacerations.   Precautions/Limitations fall precautions   General Observations Pt in bed preparing to eat with daughter at bed side upon entry.  Daughter provided hx.   Cognitive Status Examination   Orientation orientation to person, place and time   Level of Consciousness alert   Follows Commands and Answers Questions 100% of the time;able to follow single-step instructions   Personal Safety and Judgment impaired;at risk behaviors demonstrated   Memory impaired   Cognitive Comment At baseline pt makes poor/unsafe decisions secondary to impaired memory.  Hx provided by pt's daughter due to pt's severe hearing loss.   Pain Assessment   Patient Currently in Pain No   Integumentary/Edema   Integumentary/Edema other (describe)   Integumentary/Edema Comments facial bruising, swollen R eye    Posture    Posture Forward head position;Protracted shoulders   Range of Motion (ROM)   ROM Comment B UE and LE WFL per functional exam.   Strength   Strength Comments B LE WFL per functional exam- pt able to evenly bear weight through LEs, no evidence of knee buckeling.   Bed Mobility   Bed Mobility Comments Pt ModIND supine>sit with HOB elevated and use of bed rails, pt scoots EOB IND   Transfer Skills   Transfer Comments Pt performs sit<>stand with SBA and FWW, requires cuing to use walker.   Gait   Gait Comments Pt amb 70' with CGA -min A and FWW   Balance   Balance Comments Impaired dynamic balance, requires min A during gait to keep COG over SMITHA, difficulty performing turn   Sensory Examination   Sensory Perception no deficits were identified   General Therapy Interventions   Planned Therapy Interventions balance training;neuromuscular re-education;strengthening;transfer training;risk factor  "education;progressive activity/exercise   Clinical Impression   Criteria for Skilled Therapeutic Intervention yes, treatment indicated   PT Diagnosis Impaired functional mobility   Influenced by the following impairments impaired balance, impaired memory, decreased activity tolerance   Functional limitations due to impairments gait, transfers, bathing   Clinical Presentation Stable/Uncomplicated   Clinical Presentation Rationale Pt's current status   Clinical Decision Making (Complexity) Low complexity   Therapy Frequency` 3 times/week   Predicted Duration of Therapy Intervention (days/wks) 2 weeks    Anticipated Discharge Disposition Long Term Care Facility   Risk & Benefits of therapy have been explained Yes   Patient, Family & other staff in agreement with plan of care Yes   [facility] AM-PAC  \"6 Clicks\" V.2 Basic Mobility Inpatient Short Form   1. Turning from your back to your side while in a flat bed without using bedrails? 4 - None   2. Moving from lying on your back to sitting on the side of a flat bed without using bedrails? 3 - A Little   3. Moving to and from a bed to a chair (including a wheelchair)? 3 - A Little   4. Standing up from a chair using your arms (e.g., wheelchair, or bedside chair)? 4 - None   5. To walk in hospital room? 3 - A Little   6. Climbing 3-5 steps with a railing? 2 - A Lot   Basic Mobility Raw Score (Score out of 24.Lower scores equate to lower levels of function) 19   Total Evaluation Time   Total Evaluation Time (Minutes) 12[EA1.1]        Revision History        User Key Date/Time User Provider Type Action    > EA1.1 11/30/2017  4:15 PM Cristobal Miranda, PT Physical Therapist Sign                                                      INTERAGENCY TRANSFER FORM - LAB / IMAGING / EKG / EMG RESULTS   11/29/2017                       UNIT [address] [facility]: [phone]            Unresulted Labs     None         Lab Results - 3 Days      Basic metabolic panel [[id number]] " (Abnormal)  Resulted: 12/01/17 0606, Result status: Final result    Ordering provider: Gray Madison MD  12/01/17 0435 Resulting lab: [facility]    Specimen Information    Type Source Collected On   Blood  12/01/17 0536          Components       Value Reference Range Flag Lab   Sodium 143 133 - 144 mmol/L  51   Potassium 3.8 3.4 - 5.3 mmol/L  51   Chloride 113 94 - 109 mmol/L H 51   Carbon Dioxide 23 20 - 32 mmol/L  51   Anion Gap 7 3 - 14 mmol/L  51   Glucose 111 70 - 99 mg/dL H 51   Urea Nitrogen 14 7 - 30 mg/dL  51   Creatinine 0.93 0.52 - 1.04 mg/dL  51   GFR Estimate 56 >60 mL/min/1.7m2 L 51   Comment:  Non  GFR Calc   GFR Estimate If Black 68 >60 mL/min/1.7m2  51   Comment:  African American GFR Calc   Calcium 8.2 8.5 - 10.1 mg/dL L 51            Magnesium [[id number]]  Resulted: 12/01/17 0606, Result status: Final result    Ordering provider: Gray Madison MD  12/01/17 0435 Resulting lab: [facility]    Specimen Information    Type Source Collected On   Blood  12/01/17 0536          Components       Value Reference Range Flag Lab   Magnesium 1.7 1.6 - 2.3 mg/dL  51            Phosphorus [[id number]]  Resulted: 12/01/17 0606, Result status: Final result    Ordering provider: Gray Madison MD  12/01/17 0435 Resulting lab: [facility]    Specimen Information    Type Source Collected On   Blood  12/01/17 0536          Components       Value Reference Range Flag Lab   Phosphorus 3.3 2.5 - 4.5 mg/dL  51            CBC with platelets [[id number]] (Abnormal)  Resulted: 12/01/17 0548, Result status: Final result    Ordering provider: Gray Madison MD  12/01/17 0435 Resulting lab: [facility]    Specimen Information    Type Source Collected On   Blood  12/01/17 0536          Components       Value Reference Range Flag Lab   WBC 5.5 4.0 - 11.0 10e9/L  51   RBC Count 3.30  3.8 - 5.2 10e12/L L 51   Hemoglobin 9.9 11.7 - 15.7 g/dL L 51   Hematocrit 31.9 35.0 - 47.0 % L 51   MCV 97 78 - 100 fl  51   MCH 30.0 26.5 - 33.0 pg  51   MCHC 31.0 31.5 - 36.5 g/dL L 51   RDW 13.6 10.0 - 15.0 %  51   Platelet Count 241 150 - 450 10e9/L  51            Magnesium [[id number]]  Resulted: 11/30/17 1309, Result status: Final result    Ordering provider: Sarita Bronson MD  11/30/17 0147 Resulting lab: [facility]    Specimen Information    Type Source Collected On   Blood  11/30/17 1234          Components       Value Reference Range Flag Lab   Magnesium 1.7 1.6 - 2.3 mg/dL  51            Phosphorus [[id number]]  Resulted: 11/30/17 1309, Result status: Final result    Ordering provider: Sarita Bronson MD  11/30/17 0147 Resulting lab: [facility]    Specimen Information    Type Source Collected On   Blood  11/30/17 1234          Components       Value Reference Range Flag Lab   Phosphorus 3.4 2.5 - 4.5 mg/dL  51            Comprehensive metabolic panel [[id number]] (Abnormal)  Resulted: 11/30/17 1309, Result status: Final result    Ordering provider: Sarita Bronson MD  11/30/17 0147 Resulting lab: [facility]    Specimen Information    Type Source Collected On   Blood  11/30/17 1234          Components       Value Reference Range Flag Lab   Sodium 140 133 - 144 mmol/L  51   Potassium 3.9 3.4 - 5.3 mmol/L  51   Chloride 107 94 - 109 mmol/L  51   Carbon Dioxide 25 20 - 32 mmol/L  51   Anion Gap 8 3 - 14 mmol/L  51   Glucose 118 70 - 99 mg/dL H 51   Urea Nitrogen 14 7 - 30 mg/dL  51   Creatinine 0.92 0.52 - 1.04 mg/dL  51   GFR Estimate 57 >60 mL/min/1.7m2 L 51   Comment:  Non  GFR Calc   GFR Estimate If Black 69 >60 mL/min/1.7m2  51   Comment:  African American GFR Calc   Calcium 8.8 8.5 - 10.1 mg/dL  51   Bilirubin Total 0.6 0.2 - 1.3 mg/dL  51   Albumin 2.9 3.4 - 5.0 g/dL L 51   Protein Total 6.5  6.8 - 8.8 g/dL L 51   Alkaline Phosphatase 55 40 - 150 U/L  51   ALT 9 0 - 50 U/L  51   AST 14 0 - 45 U/L  51            CBC with platelets [[id number]] (Abnormal)  Resulted: 11/30/17 1243, Result status: Final result    Ordering provider: Sarita Bronson MD  11/30/17 0147 Resulting lab: [facility]    Specimen Information    Type Source Collected On   Blood  11/30/17 1234          Components       Value Reference Range Flag Lab   WBC 6.6 4.0 - 11.0 10e9/L  51   RBC Count 3.64 3.8 - 5.2 10e12/L L 51   Hemoglobin 11.0 11.7 - 15.7 g/dL L 51   Hematocrit 35.0 35.0 - 47.0 %  51   MCV 96 78 - 100 fl  51   MCH 30.2 26.5 - 33.0 pg  51   MCHC 31.4 31.5 - 36.5 g/dL L 51   RDW 13.3 10.0 - 15.0 %  51   Platelet Count 257 150 - 450 10e9/L  51            Glucose by meter [[id number]] (Abnormal)  Resulted: 11/30/17 1205, Result status: Final result    Ordering provider: Miles Hester MD  11/30/17 1201 Resulting lab: POINT OF CARE TEST, GLUCOSE    Specimen Information    Type Source Collected On     11/30/17 1201          Components       Value Reference Range Flag Lab   Glucose 117 70 - 99 mg/dL H 170            UA with Microscopic reflex to Culture [[id number]] (Abnormal)  Resulted: 11/30/17 1020, Result status: Final result    Ordering provider: Sarita Bronson MD  11/30/17 0147 Resulting lab: [facility]    Specimen Information    Type Source Collected On   Midstream Urine Urine clean catch 11/30/17 0947          Components       Value Reference Range Flag Lab   Color Urine Light Yellow   51   Appearance Urine Clear   51   Glucose Urine Negative NEG^Negative mg/dL  51   Bilirubin Urine Negative NEG^Negative  51   Ketones Urine 5 NEG^Negative mg/dL A 51   Specific Gravity Urine 1.033 1.003 - 1.035  51   Blood Urine Negative NEG^Negative  51   pH Urine 6.0 5.0 - 7.0 pH  51   Protein Albumin Urine Negative NEG^Negative mg/dL  51   Urobilinogen mg/dL Normal 0.0 - 2.0  mg/dL  51   Nitrite Urine Negative NEG^Negative  51   Leukocyte Esterase Urine Trace NEG^Negative A 51   Source Midstream Urine   51   WBC Urine 7 0 - 2 /HPF H 51   RBC Urine 2 0 - 2 /HPF  51   Squamous Epithelial /HPF Urine 1 0 - 1 /HPF  51   Transitional Epi <1 0 - 1 /HPF  51            Glucose by meter [[id number]] (Abnormal)  Resulted: 11/30/17 0401, Result status: Final result    Ordering provider: Miles Hester MD  11/30/17 0355 Resulting lab: POINT OF CARE TEST, GLUCOSE    Specimen Information    Type Source Collected On     11/30/17 0355          Components       Value Reference Range Flag Lab   Glucose 110 70 - 99 mg/dL H 170            Glucose by meter [[id number]] (Abnormal)  Resulted: 11/30/17 0235, Result status: Final result    Ordering provider: Miles Hester MD  11/30/17 0231 Resulting lab: POINT OF CARE TEST, GLUCOSE    Specimen Information    Type Source Collected On     11/30/17 0231          Components       Value Reference Range Flag Lab   Glucose 123 70 - 99 mg/dL H 170            Troponin I [[id number]]  Resulted: 11/29/17 2021, Result status: Final result    Ordering provider: Miles Hester MD  11/29/17 1921 Resulting lab: [facility]    Specimen Information    Type Source Collected On   Blood  11/29/17 1948          Components       Value Reference Range Flag Lab   Troponin I ES 0.019 0.000 - 0.045 ug/L  51   Comment:         The 99th percentile for upper reference range is 0.045 ug/L.  Troponin values   in the range of 0.045 - 0.120 ug/L may be associated with risks of adverse   clinical events.              INR [[id number]]  Resulted: 11/29/17 2020, Result status: Final result    Ordering provider: Miles Hester MD  11/29/17 1921 Resulting lab: [facility]    Specimen Information    Type Source Collected On   Blood  11/29/17 1948          Components       Value Reference Range Flag Lab   INR 1.04 0.86 -  1.14  51            Partial thromboplastin time [[id number]]  Resulted: 11/29/17 2020, Result status: Final result    Ordering provider: Miles Hester MD  11/29/17 1948 Resulting lab: [facility]    Specimen Information    Type Source Collected On     11/29/17 1948          Components       Value Reference Range Flag Lab   PTT 33 22 - 37 sec  51            CBC with platelets differential [[id number]]  Resulted: 11/29/17 2003, Result status: Final result    Ordering provider: Miles Hester MD  11/29/17 1921 Resulting lab: [facility]    Specimen Information    Type Source Collected On   Blood  11/29/17 1948          Components       Value Reference Range Flag Lab   WBC 8.8 4.0 - 11.0 10e9/L  51   RBC Count 4.06 3.8 - 5.2 10e12/L  51   Hemoglobin 12.4 11.7 - 15.7 g/dL  51   Hematocrit 38.5 35.0 - 47.0 %  51   MCV 95 78 - 100 fl  51   MCH 30.5 26.5 - 33.0 pg  51   MCHC 32.2 31.5 - 36.5 g/dL  51   RDW 13.1 10.0 - 15.0 %  51   Platelet Count 313 150 - 450 10e9/L  51   Diff Method Automated Method   51   % Neutrophils 72.3 %  51   % Lymphocytes 21.1 %  51   % Monocytes 4.8 %  51   % Eosinophils 0.9 %  51   % Basophils 0.3 %  51   % Immature Granulocytes 0.6 %  51   Nucleated RBCs 0 0 /100  51   Absolute Neutrophil 6.3 1.6 - 8.3 10e9/L  51   Absolute Lymphocytes 1.9 0.8 - 5.3 10e9/L  51   Absolute Monocytes 0.4 0.0 - 1.3 10e9/L  51   Absolute Eosinophils 0.1 0.0 - 0.7 10e9/L  51   Absolute Basophils 0.0 0.0 - 0.2 10e9/L  51   Abs Immature Granulocytes 0.1 0 - 0.4 10e9/L  51   Absolute Nucleated RBC 0.0   51            Testing Performed By     Lab - Abbreviation Name Director Address Valid Date Range    51 - Unknown [facility] Unknown [address] [facility] [address] 12/31/14 1010 - Present    170 - Unknown POINT OF CARE TEST, GLUCOSE Unknown Unknown 10/31/11 1114 - Present               Imaging Results - 3 Days       CT Chest/Abdomen/Pelvis w Contrast [[id number]] (Abnormal)  Resulted: 11/30/17 0656, Result status: Final result    Ordering provider: Miles Hester MD  11/29/17 2056 Resulted by: Alex Wilkins MD Rivard, Marcel Helorian, MD    Performed: 11/29/17 2201 - 11/29/17 2224 Resulting lab: RADIOLOGY RESULTS    Narrative:       EXAMINATION: CT CHEST/ABDOMEN/PELVIS W CONTRAST, 11/29/2017 10:24 PM    TECHNIQUE:  Helical CT images from the thoracic inlet through the  symphysis pubis were obtained  with contrast. Contrast dose: 104    COMPARISON: Head CT from earlier on the same day, radiograph  4/26/2017, CT 1/8/2004.    HISTORY: Fall with cranial fracture, evaluate for other trauma.    FINDINGS:    Chest:   The mediastinal vasculature is intact. There is no pneumothorax or  pleural effusion. No suspected pulmonary contusion. Heart is mildly  enlarged without pericardial effusion. There are atherosclerotic  vascular calcifications no mediastinal or axillary lymphadenopathy.  The trachea and central airways are clear.    There are scattered consolidative opacities in the lungs, most notably  in the right middle and the bilateral lower lobes. The most focal of  these is an area of linear peribronchial vascular increased  attenuation with associated bronchiectasis in the medial right middle  lobe. There is mucous plugging in the lateral right middle lobe within  expanded peripheral bronchi. There is mild tree-in-bud nodularity in  the medial right upper lobe in general, these findings are new or  increased from the CT in 2004. There is an area of linear subpleural  fibrotic opacities with subpleural consolidation in the lateral right  lower lobe which is increased since the comparison in 2004. There is  posterior basilar fibroatelectasis.    Abdomen and pelvis:   No free air or free fluid collection. No evidence of acute traumatic  abnormality in the abdomen or pelvis. The hepatic parenchyma is within  normal  limits. There is moderate intra and extrahepatic biliary ductal  dilation, increased from 2004, possibly representing reservoir effect  and age-related changes. Spleen is small and otherwise within normal  limits. The pancreas is diffusely atrophic. There is a 1.1 x 0.8 cm  cyst adjacent to or within the tail of the pancreas (series 4, image  116), stable since 2004. The stomach and small bowel are within normal  limits. There is extensive distal colonic diverticulosis without focal  inflammatory changes to suggest diverticulitis. Diffuse renal cortical  thinning with scattered areas of focal cortical thinning. No  hydronephrosis or nephrolithiasis. No abnormal lymph nodes identified.  Concentric vascular calcifications in the aorta and major branch  vessel ostia, without obvious focal stenosis.    Bones and soft tissues:   There is a compression deformity of the T7 vertebral body with  approximately 50% loss of the vertebral body height anteriorly, which  appears progressed from the chest radiograph on 4/26/2017. There are  additional Schmorl's node deformities, some of which may mimic minimal  endplate compression deformities. Otherwise there are diffuse chronic  degenerative changes. Motion artifact in the mid thorax mimics a  sternal fracture, and slightly limits evaluation for small  nondisplaced fractures.      Impression:       IMPRESSION:   1. Progression of a compression deformity of the T7 vertebral body  since 4/26/2017 with approximately 50% loss of vertebral body height  anteriorly. This could be a chronic or acute on chronic fracture.  There are no significant retropulsed fragments or associated spinal  canal narrowing.  2. Patchy linear pulmonary opacities with bronchiectasis and mucous  plugging, as well as a few areas of tree-in-bud nodularity. The  majority of the findings likely represent chronic scarring from prior  infection, although the tree-in-bud nodularity is compatible with  acute  inflammation or infection.  3. Moderate intra and extrahepatic biliary ductal dilation has  progressed since 2004. This is most likely due to a combination of  age-related changes and reservoir effect following cholecystectomy.  4. Indeterminate cystic lesion of the tail of the pancreas, stable  since 2004. This most likely represents a benign cyst, possible  sequelae from prior infection or inflammation. Cystic pancreatic  neoplasm is considered much less likely given the stability over  greater than a decade.  5. Diffuse renal cortical thinning and focal cortical scarring  compatible with sequela of prior infectious or ischemic insult.      [Urgent Result: Age-indeterminate progression of a T7 compression  deformity]    Finding was identified on 11/29/2017 10:27 PM.     Dr. Bronson was contacted by Dr. Mejias at 11/29/2017 11:03 PM and  verbalized understanding of the urgent finding.          I have personally reviewed the examination and initial interpretation  and I agree with the findings.    CARRI LAUGHLIN MD    Components       Value Reference Range Flag Lab   Radiologist flags Age-indeterminate progression of a T7 compression  Urgent             Testing Performed By     Lab - Abbreviation Name Director Address Valid Date Range    104 - Rad [facility] RADIOLOGY RESULTS Unknown Unknown 02/16/05 1553 - Present            Encounter-Level Documents:     There are no encounter-level documents.      Order-Level Documents:     There are no order-level documents.

## 2017-11-29 NOTE — ED NOTES
Bed: ED06  Expected date: 11/29/17  Expected time: 1:38 PM  Means of arrival: Ambulance  Comments:  [facility]  89yo F  fall

## 2017-11-30 ENCOUNTER — APPOINTMENT (OUTPATIENT)
Dept: PHYSICAL THERAPY | Facility: CLINIC | Age: 82
End: 2017-11-30
Attending: STUDENT IN AN ORGANIZED HEALTH CARE EDUCATION/TRAINING PROGRAM
Payer: MEDICARE

## 2017-11-30 PROBLEM — S02.85XA: Status: ACTIVE | Noted: 2017-11-30
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
## 2017-11-30 LAB
ALBUMIN SERPL-MCNC: 2.9 G/DL (ref 3.4–5)
ALBUMIN UR-MCNC: NEGATIVE MG/DL
ALP SERPL-CCNC: 55 U/L (ref 40–150)
ALT SERPL W P-5'-P-CCNC: 9 U/L (ref 0–50)
ANION GAP SERPL CALCULATED.3IONS-SCNC: 8 MMOL/L (ref 3–14)
APPEARANCE UR: CLEAR
AST SERPL W P-5'-P-CCNC: 14 U/L (ref 0–45)
BILIRUB SERPL-MCNC: 0.6 MG/DL (ref 0.2–1.3)
BILIRUB UR QL STRIP: NEGATIVE
BUN SERPL-MCNC: 14 MG/DL (ref 7–30)
CALCIUM SERPL-MCNC: 8.8 MG/DL (ref 8.5–10.1)
CHLORIDE SERPL-SCNC: 107 MMOL/L (ref 94–109)
CO2 SERPL-SCNC: 25 MMOL/L (ref 20–32)
COLOR UR AUTO: ABNORMAL
CREAT SERPL-MCNC: 0.92 MG/DL (ref 0.52–1.04)
ERYTHROCYTE [DISTWIDTH] IN BLOOD BY AUTOMATED COUNT: 13.3 % (ref 10–15)
GFR SERPL CREATININE-BSD FRML MDRD: 57 ML/MIN/1.7M2
GLUCOSE BLDC GLUCOMTR-MCNC: 110 MG/DL (ref 70–99)
GLUCOSE BLDC GLUCOMTR-MCNC: 117 MG/DL (ref 70–99)
GLUCOSE BLDC GLUCOMTR-MCNC: 123 MG/DL (ref 70–99)
GLUCOSE SERPL-MCNC: 118 MG/DL (ref 70–99)
GLUCOSE UR STRIP-MCNC: NEGATIVE MG/DL
HCT VFR BLD AUTO: 35 % (ref 35–47)
HGB BLD-MCNC: 11 G/DL (ref 11.7–15.7)
HGB UR QL STRIP: NEGATIVE
KETONES UR STRIP-MCNC: 5 MG/DL
LEUKOCYTE ESTERASE UR QL STRIP: ABNORMAL
MAGNESIUM SERPL-MCNC: 1.7 MG/DL (ref 1.6–2.3)
MCH RBC QN AUTO: 30.2 PG (ref 26.5–33)
MCHC RBC AUTO-ENTMCNC: 31.4 G/DL (ref 31.5–36.5)
MCV RBC AUTO: 96 FL (ref 78–100)
NITRATE UR QL: NEGATIVE
PH UR STRIP: 6 PH (ref 5–7)
PHOSPHATE SERPL-MCNC: 3.4 MG/DL (ref 2.5–4.5)
PLATELET # BLD AUTO: 257 10E9/L (ref 150–450)
POTASSIUM SERPL-SCNC: 3.9 MMOL/L (ref 3.4–5.3)
PROT SERPL-MCNC: 6.5 G/DL (ref 6.8–8.8)
RADIOLOGIST FLAGS: ABNORMAL
RBC # BLD AUTO: 3.64 10E12/L (ref 3.8–5.2)
RBC #/AREA URNS AUTO: 2 /HPF (ref 0–2)
SODIUM SERPL-SCNC: 140 MMOL/L (ref 133–144)
SOURCE: ABNORMAL
SP GR UR STRIP: 1.03 (ref 1–1.03)
SQUAMOUS #/AREA URNS AUTO: 1 /HPF (ref 0–1)
TRANS CELLS #/AREA URNS HPF: <1 /HPF (ref 0–1)
UROBILINOGEN UR STRIP-MCNC: NORMAL MG/DL (ref 0–2)
WBC # BLD AUTO: 6.6 10E9/L (ref 4–11)
WBC #/AREA URNS AUTO: 7 /HPF (ref 0–2)

## 2017-11-30 PROCEDURE — 97161 PT EVAL LOW COMPLEX 20 MIN: CPT | Mod: GP

## 2017-11-30 PROCEDURE — 80053 COMPREHEN METABOLIC PANEL: CPT | Performed by: STUDENT IN AN ORGANIZED HEALTH CARE EDUCATION/TRAINING PROGRAM

## 2017-11-30 PROCEDURE — 00000146 ZZHCL STATISTIC GLUCOSE BY METER IP

## 2017-11-30 PROCEDURE — 84100 ASSAY OF PHOSPHORUS: CPT | Performed by: STUDENT IN AN ORGANIZED HEALTH CARE EDUCATION/TRAINING PROGRAM

## 2017-11-30 PROCEDURE — 81001 URINALYSIS AUTO W/SCOPE: CPT | Performed by: STUDENT IN AN ORGANIZED HEALTH CARE EDUCATION/TRAINING PROGRAM

## 2017-11-30 PROCEDURE — 85027 COMPLETE CBC AUTOMATED: CPT | Performed by: STUDENT IN AN ORGANIZED HEALTH CARE EDUCATION/TRAINING PROGRAM

## 2017-11-30 PROCEDURE — S0077 INJECTION, CLINDAMYCIN PHOSP: HCPCS | Performed by: STUDENT IN AN ORGANIZED HEALTH CARE EDUCATION/TRAINING PROGRAM

## 2017-11-30 PROCEDURE — G0378 HOSPITAL OBSERVATION PER HR: HCPCS

## 2017-11-30 PROCEDURE — 40000193 ZZH STATISTIC PT WARD VISIT

## 2017-11-30 PROCEDURE — 25000132 ZZH RX MED GY IP 250 OP 250 PS 637: Mod: GY | Performed by: STUDENT IN AN ORGANIZED HEALTH CARE EDUCATION/TRAINING PROGRAM

## 2017-11-30 PROCEDURE — 97530 THERAPEUTIC ACTIVITIES: CPT | Mod: GP

## 2017-11-30 PROCEDURE — 96376 TX/PRO/DX INJ SAME DRUG ADON: CPT

## 2017-11-30 PROCEDURE — 25000132 ZZH RX MED GY IP 250 OP 250 PS 637: Mod: GY | Performed by: NURSE PRACTITIONER

## 2017-11-30 PROCEDURE — 36415 COLL VENOUS BLD VENIPUNCTURE: CPT | Performed by: STUDENT IN AN ORGANIZED HEALTH CARE EDUCATION/TRAINING PROGRAM

## 2017-11-30 PROCEDURE — 25000128 H RX IP 250 OP 636: Performed by: STUDENT IN AN ORGANIZED HEALTH CARE EDUCATION/TRAINING PROGRAM

## 2017-11-30 PROCEDURE — 83735 ASSAY OF MAGNESIUM: CPT | Performed by: STUDENT IN AN ORGANIZED HEALTH CARE EDUCATION/TRAINING PROGRAM

## 2017-11-30 PROCEDURE — A9270 NON-COVERED ITEM OR SERVICE: HCPCS | Mod: GY | Performed by: STUDENT IN AN ORGANIZED HEALTH CARE EDUCATION/TRAINING PROGRAM

## 2017-11-30 PROCEDURE — 25000125 ZZHC RX 250: Performed by: STUDENT IN AN ORGANIZED HEALTH CARE EDUCATION/TRAINING PROGRAM

## 2017-11-30 PROCEDURE — A9270 NON-COVERED ITEM OR SERVICE: HCPCS | Mod: GY | Performed by: NURSE PRACTITIONER

## 2017-11-30 RX ORDER — ONDANSETRON 2 MG/ML
4 INJECTION INTRAMUSCULAR; INTRAVENOUS EVERY 6 HOURS PRN
Status: DISCONTINUED | OUTPATIENT
Start: 2017-11-30 | End: 2017-12-01 | Stop reason: HOSPADM

## 2017-11-30 RX ORDER — LIDOCAINE 40 MG/G
CREAM TOPICAL
Status: DISCONTINUED | OUTPATIENT
Start: 2017-11-30 | End: 2017-12-01 | Stop reason: HOSPADM

## 2017-11-30 RX ORDER — ONDANSETRON 4 MG/1
4 TABLET, ORALLY DISINTEGRATING ORAL EVERY 6 HOURS PRN
Status: DISCONTINUED | OUTPATIENT
Start: 2017-11-30 | End: 2017-12-01 | Stop reason: HOSPADM

## 2017-11-30 RX ORDER — SODIUM CHLORIDE 9 MG/ML
1000 INJECTION, SOLUTION INTRAVENOUS CONTINUOUS
Status: DISCONTINUED | OUTPATIENT
Start: 2017-11-30 | End: 2017-12-01

## 2017-11-30 RX ORDER — CALCIUM CARBONATE 500 MG/1
500 TABLET, CHEWABLE ORAL 4 TIMES DAILY PRN
Status: DISCONTINUED | OUTPATIENT
Start: 2017-11-30 | End: 2017-12-01 | Stop reason: HOSPADM

## 2017-11-30 RX ORDER — SENNOSIDES 8.6 MG
1 TABLET ORAL DAILY
Status: DISCONTINUED | OUTPATIENT
Start: 2017-11-30 | End: 2017-12-01 | Stop reason: HOSPADM

## 2017-11-30 RX ORDER — ACETAMINOPHEN 500 MG
1000 TABLET ORAL 3 TIMES DAILY
Status: DISCONTINUED | OUTPATIENT
Start: 2017-11-30 | End: 2017-12-01 | Stop reason: HOSPADM

## 2017-11-30 RX ORDER — LEVOTHYROXINE SODIUM 88 UG/1
88 TABLET ORAL DAILY
Status: DISCONTINUED | OUTPATIENT
Start: 2017-11-30 | End: 2017-12-01 | Stop reason: HOSPADM

## 2017-11-30 RX ORDER — NALOXONE HYDROCHLORIDE 0.4 MG/ML
.1-.4 INJECTION, SOLUTION INTRAMUSCULAR; INTRAVENOUS; SUBCUTANEOUS
Status: DISCONTINUED | OUTPATIENT
Start: 2017-11-30 | End: 2017-12-01 | Stop reason: HOSPADM

## 2017-11-30 RX ORDER — ASCORBIC ACID 500 MG
1000 TABLET ORAL DAILY
Status: DISCONTINUED | OUTPATIENT
Start: 2017-11-30 | End: 2017-12-01 | Stop reason: HOSPADM

## 2017-11-30 RX ORDER — CALCIUM CARBONATE 500 MG/1
1 TABLET, CHEWABLE ORAL 4 TIMES DAILY PRN
COMMUNITY

## 2017-11-30 RX ORDER — CLINDAMYCIN PHOSPHATE 600 MG/50ML
600 INJECTION, SOLUTION INTRAVENOUS EVERY 8 HOURS
Status: DISCONTINUED | OUTPATIENT
Start: 2017-11-30 | End: 2017-12-01 | Stop reason: HOSPADM

## 2017-11-30 RX ORDER — NITROGLYCERIN 0.4 MG/1
0.4 TABLET SUBLINGUAL EVERY 5 MIN PRN
Status: DISCONTINUED | OUTPATIENT
Start: 2017-11-30 | End: 2017-12-01 | Stop reason: HOSPADM

## 2017-11-30 RX ADMIN — SODIUM CHLORIDE 1000 ML: 9 INJECTION, SOLUTION INTRAVENOUS at 03:12

## 2017-11-30 RX ADMIN — ACETAMINOPHEN 1000 MG: 500 TABLET, FILM COATED ORAL at 12:59

## 2017-11-30 RX ADMIN — SODIUM CHLORIDE 1000 ML: 9 INJECTION, SOLUTION INTRAVENOUS at 12:59

## 2017-11-30 RX ADMIN — SODIUM CHLORIDE 1000 ML: 9 INJECTION, SOLUTION INTRAVENOUS at 23:43

## 2017-11-30 RX ADMIN — CLINDAMYCIN PHOSPHATE 600 MG: 12 INJECTION, SOLUTION INTRAMUSCULAR; INTRAVENOUS at 05:12

## 2017-11-30 RX ADMIN — CLINDAMYCIN PHOSPHATE 600 MG: 12 INJECTION, SOLUTION INTRAMUSCULAR; INTRAVENOUS at 12:59

## 2017-11-30 RX ADMIN — CLINDAMYCIN PHOSPHATE 600 MG: 12 INJECTION, SOLUTION INTRAMUSCULAR; INTRAVENOUS at 21:03

## 2017-11-30 RX ADMIN — LEVOTHYROXINE SODIUM 88 MCG: 88 TABLET ORAL at 12:59

## 2017-11-30 ASSESSMENT — ENCOUNTER SYMPTOMS
CONSTITUTIONAL NEGATIVE: 1
DECREASED CONCENTRATION: 1
EYE PAIN: 0
ENDOCRINE NEGATIVE: 1
NEUROLOGICAL NEGATIVE: 1
MUSCULOSKELETAL NEGATIVE: 1
BACK PAIN: 0
GASTROINTESTINAL NEGATIVE: 1
DYSPHORIC MOOD: 1
WEAKNESS: 0
NAUSEA: 1
EYES NEGATIVE: 1
NERVOUS/ANXIOUS: 1
BRUISES/BLEEDS EASILY: 0
HEMATOLOGIC/LYMPHATIC NEGATIVE: 1
RESPIRATORY NEGATIVE: 1
VOMITING: 0
CARDIOVASCULAR NEGATIVE: 1

## 2017-11-30 ASSESSMENT — PAIN DESCRIPTION - DESCRIPTORS: DESCRIPTORS: ACHING

## 2017-11-30 ASSESSMENT — ACTIVITIES OF DAILY LIVING (ADL)
ADLS_ACUITY_SCORE: 13
ADLS_ACUITY_SCORE: 12
ADLS_ACUITY_SCORE: 12

## 2017-11-30 NOTE — PLAN OF CARE
Problem: Patient Care Overview  Goal: Plan of Care/Patient Progress Review    OT-6b: Cancel OT eval, per PT, pt has no acute OT needs at this time, and has adequate assist at her previous living situation. Will d/c consult, PT to follow.

## 2017-11-30 NOTE — PROGRESS NOTES
Transfer  Transferred from:   Via:bed  Reason for transfer: tele monitor  Family: Aware of transfer  Belongings: Received with pt  Chart: Received with pt  Medications: Meds received from old unit with pt  2 RN Skin Assessment Completed By: Melo Resendiz and Renetta Ford  Report received from: Scotty  Pt status: VSS, in bed, call light within reach.

## 2017-11-30 NOTE — PROGRESS NOTES
SPIRITUAL HEALTH SERVICES  SPIRITUAL ASSESSMENT Progress Note  [facility] ([place] 6B     REFERRAL SOURCE: Hospital  Request    Introduced myself to pt (Olga) and daughter (Sabrina) and the support/services available through the [facility] team. 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 Daughter said her mom took a terrible fall at her memory care facility and cracked part of her skull (and blackened her eyes). Olga has 3 children, 4 grandchildren and 7 great grandchildren. She is born and raised in [place]. Loves coffee ... I was told that is her one vice. Olga is hard of hearing and her left ear is best for speaking with her. She welcomed prayers for healing and recovery.    PLAN: I will check in 1x/week to see how she is recovering.     Tom Davis  Chaplain Resident  Pager [phone]

## 2017-11-30 NOTE — PROGRESS NOTES
TRN Rounding Note: Stopped to see pt. No family available. Pt confused and disorientated to place, date and time. Folder left with information about fall prevention, osteoporosis and facial fracture care, precautions and treatment. Care nurse informed and will share with pt's daughter when she comes.

## 2017-11-30 NOTE — ED NOTES
Bed: IN03  Expected date: 11/29/17  Expected time:   Means of arrival:   Comments:  Olga Zelaya 89 yo fall with right orbital frax  Seen at [facility] ER. To  ER for facial trauma eval. No other major injuries. ? mech fall vs syncope?  No anticoagulants

## 2017-11-30 NOTE — PHARMACY-ADMISSION MEDICATION HISTORY
Admission medication history interview status for the 11/29/2017 admission is complete. See Epic admission navigator for allergy information, pharmacy, prior to admission medications and immunization status.     Medication history interview sources:  [facility] ([facility])    Changes made to PTA medication list (reason)  Added:  - Tramadol 25 mg BID  - Calcium carbonate 500 mg QID PRN  Deleted:  - Isosorbide mononitrate 5 mg BID  Changed:  - Tramadol 50 mg q6h PRN => 25 mg QD PRN    Additional medication history information (including reliability of information, actions taken by pharmacist):  - Med hx was gathered from med list provided by [facility] ([phone]); pt's PTA med list was updated accordingly. Facility staff verified that prior to admission, pt last received her meds on 11/29 AM.  - Pt has prescriptions for both PRN and scheduled tramadol.      Prior to Admission medications    Medication Sig Last Dose Taking? Auth Provider   TRAMADOL HCL PO Take 25 mg by mouth 2 times daily 11/29/2017 at AM Yes Unknown, Entered By History   calcium carbonate (TUMS) 500 MG chewable tablet Take 1 chew tab by mouth 4 times daily as needed for heartburn PRN Yes Unknown, Entered By History   TRAMADOL HCL PO Take 25 mg by mouth daily as needed PRN Yes Unknown, Entered By History   ASPIRIN PO Take 81 mg by mouth daily 11/29/2017 at AM Yes Reported, Patient   NITROGLYCERIN SL Place 0.4 mg under the tongue every 5 minutes as needed for chest pain PRN Yes Unknown, Entered By History   LEVOTHYROXINE SODIUM PO Take 88 mcg by mouth daily 11/29/2017 at AM Yes Unknown, Entered By History   ASCORBIC ACID PO Take 1,000 mg by mouth daily 11/29/2017 at AM Yes Unknown, Entered By History   ACETAMINOPHEN PO Take 1,000 mg by mouth 3 times daily 11/29/2017 at AM Yes Unknown, Entered By History   cholecalciferol (VITAMIN D) 1000 UNIT tablet Take 1 tablet by mouth daily. 11/29/2017 at AM Yes Unknown, Entered By  History   mineral oil enema Place 1 enema rectally daily as needed for constipation PRN  Unknown, Entered By History   magnesium hydroxide (MILK OF MAGNESIA) 400 MG/5ML suspension Take 30 mLs by mouth daily as needed for constipation or heartburn PRN  Unknown, Entered By History   sennosides (SENOKOT) 8.6 MG tablet Take 1 tablet by mouth daily as needed for constipation PRN  Unknown, Entered By History         Medication history completed by: Jona Manzo, PharmD IV Student

## 2017-11-30 NOTE — PLAN OF CARE
Problem: Patient Care Overview  Goal: Plan of Care/Patient Progress Review  PT -     Discharge Planner PT   Patient plan for discharge: LTC Memory Care Unit  Current status: Pt is Mod IND for bed mobility, she requires SBA-min A and FWW for sit<>stand and amb.  Pt requires frequent cuing to use FWW.  Barriers to return to prior living situation: Medical status, impaired balance, impaired memory  Recommendations for discharge: LTC Memory Care Unit with increased services to better meet functional needs and mitigate risk of falls.   Rationale for recommendations: Pt's current functional status is close to baseline, impaired memory and high risk of falls are major limiting factors.          Entered by: Rubina Jaimes 11/30/2017 2:28 PM

## 2017-11-30 NOTE — PROGRESS NOTES
During the Measurements for  TLSO Neuro surgery meet with patient and decided to cancel TLSO order at this time.

## 2017-11-30 NOTE — PROGRESS NOTES
[facility], [place]    Trauma Service Tertiary Survey     Date of Service: 11/30/2017  Trauma mechanism: unwitnessed fall  Time/date of injury: ~1pm, 11/29   Known Injuries:  1. Right supraorbital fracture with displacement into frontal sinus  2. Facial laceration  3. T7 compression /deformity with unknown chronicity     Other diagnoses:   1. Memory loss  2. Hypothyroidism  3. DM2  4. Hyperlipidemia  5. HTN  6. H/o NSTEMI  7. Hearing loss   Plan:  1. Tertiary survey completed today with no additional injuries. Patient is very hard of hearing with memory loss which made the exam challenging. Denies neck or back pain.  2. Right supraorbital fracture: Pupils equal and reactive, cataracts. Moderate bilateral periorbital ecchymosis. Denies pain, headache or nausea. Laceration repaired in ED, well approximated.  3. Acute pain: denies pain so far. PRN Tylenol available. No narcs needed.  4. T7 compression with unknown chronicity: Neurosurgery consulted. Recommended TLSO brace. Discussed with daughter plan with daughter for this 89 year old lady with memory loss. She would prefer not to have a brace for her mother who is very mobile and will issues with bedrest compliance until the brace is fitted and manufactured, assistance and compliance at her memory care facility and overall quality of life. NSG to talk to family.  5. PT/OT consult  6. Social work for D/C planning  7. Palliative Care- Geriatric trauma    General Cares:       PPI/H2 blocker:  Regular diet      DVT prophylaxis: Mechanical for now      Bowel Regimen/Date of last stool: Ordered/ PTA      Pulmonary toilet: Cough and deep breath      Lines / drains:NO diaz/ PIV    Code status: DNR/DNI      Disposition: DC today or tomorrow    ETOH: Olga ARREOLA Garcia was asked if in the last 3-6 months there has been a time when she had 4 or more drinks in a single day/outing.. Patient answer to the screening question was in the negative. No  "intervention needed..    SUBJECTIVE:  Review of Systems   Constitutional: Negative.    HENT: Negative.    Eyes: Negative.    Respiratory: Negative.    Cardiovascular: Negative.    Gastrointestinal: Negative.    Endocrine: Negative.    Genitourinary: Negative.    Musculoskeletal: Negative.    Skin: Negative.    Neurological: Negative.         Baseline memory loss   Hematological: Negative.        OBJECTIVE:  Blood pressure 121/60, pulse 141, temperature 98.1  F (36.7  C), temperature source Oral, resp. rate 18, height 1.676 m (5' 6\"), weight 65.8 kg (145 lb 1 oz), SpO2 98 %.  Physical Exam   Constitutional: No distress.   Lying in bed, appears her stated age, frail   HENT:   Facial ecchymosis   Eyes: Pupils are equal, round, and reactive to light. Right eye exhibits no discharge.   Neck: Normal range of motion. No tracheal deviation present.   Cardiovascular: Normal rate.    Pulmonary/Chest: Effort normal. No respiratory distress. She has no wheezes.   Abdominal: Soft. Bowel sounds are normal.   Musculoskeletal: Normal range of motion. She exhibits no edema.   Lymphadenopathy:     She has no cervical adenopathy.   Neurological:   Oriented to self, Confused to place and time   Skin: Skin is warm and dry. She is not diaphoretic. No erythema.   Moderate bilateral periorbital eccymosis       ROUTINE LABS: (Last four results)  CMP  Recent Labs  Lab 11/30/17  1234 11/29/17  1402    134   POTASSIUM 3.9 4.1   CHLORIDE 107 98   CO2 25 27   ANIONGAP 8 9   * 171*   BUN 14 20   CR 0.92 0.97   GFRESTIMATED 57* 53*   GFRESTBLACK 69 65   RADHA 8.8 9.6   MAG 1.7  --    PHOS 3.4  --    PROTTOTAL 6.5*  --    ALBUMIN 2.9*  --    BILITOTAL 0.6  --    ALKPHOS 55  --    AST 14  --    ALT 9  --      CBC  Recent Labs  Lab 11/30/17  1234 11/29/17  1948 11/29/17  1402   WBC 6.6 8.8 6.5   RBC 3.64* 4.06 4.13   HGB 11.0* 12.4 12.6   HCT 35.0 38.5 38.8   MCV 96 95 94   MCH 30.2 30.5 30.5   MCHC 31.4* 32.2 32.5   RDW 13.3 13.1 12.8 "    313 367     INR  Recent Labs  Lab 11/29/17 1948   INR 1.04     Arterial Blood GasNo lab results found in last 7 days.    RADIOLOGY:  All radiology reviewed.    TAMMI Power CNP

## 2017-11-30 NOTE — PROGRESS NOTES
Patient arrived from ED at 0115. Right eye red and closed shot. Moving all extremities actively with no discomfort. Bilateral pupil responding to light 3 to 2 mm. Alert and oriented to person and situation. Easily redirectable. Hard of hearing on both ears/better on left ear.  Reviewed plan of care with patient. Initiated admission requirement documentation. Continue with plan of care.

## 2017-11-30 NOTE — PROGRESS NOTES
CT results discussed with Radiology, including age indeterminate progression of T7 compression deformity. Pt was not tender along the thoracic spine on exam. Will monitor. Staff updated.     OMFS also updated ED team that antibiosis no longer recommended. Given displacement of the orbital fracture into the sinus, will continue antibiotics this evening and clarify with OMFS in am.

## 2017-11-30 NOTE — CONSULTS
"[facility]  NEUROSURGERY CONSULTATION NOTE    This consultation was requested by the Trauma Service.    Reason for Consultation: T7 Compression Fracture -- Acute vs Chronic    HPI: Olga Zelaya is a sweet 89 year old female whose past medical history is significant for Diabetes, Hypothyroidism, Hyperlipidemia, and Dementia. She resides in a Memory Care Facility. The patient was transferred to [facility] Emergency Department from [facility] Emergency Department for escalation of care. The patient reportedly had suffered a fall at her memory care facility after she was found \"down\" by staff members.  The patient is a poor historian in the setting of her dementia and therefore the majority of this HPI was completed via Chart Review. Today, the Neurosurgery Service was consulted for evaluation of a T7 Compression Fracture that was identified on CT Chest/Abdomen/Pelvis. It is uncertain whether this is a new fracture, however, according to the Radiology findings, there appears to be an acute component.     Upon questioning, the patient denies back discomfort, upper or lower extremity weakness, numbness or tingling, gait instability, or loss of bowel or bladder control.         PAST MEDICAL HISTORY:   Past Medical History:   Diagnosis Date     Dementia      Diabetes mellitus (H)      High cholesterol      Hypothyroid        PAST SURGICAL HISTORY:   Past Surgical History:   Procedure Laterality Date     APPENDECTOMY       BIOPSY ARTERY TEMPORAL Left 4/28/2017    Procedure: BIOPSY ARTERY TEMPORAL;  LEFT TEMPORAL ARTERY BIOPSY.;  Surgeon: Jem Jaramillo MD;  Location: [facility] OR     CHOLECYSTECTOMY         FAMILY HISTORY:   Family History   Problem Relation Age of Onset     DIABETES Mother        SOCIAL HISTORY:   Social History   Substance Use Topics     Smoking status: Never Smoker     Smokeless tobacco: Not on file     Alcohol use No " "      MEDICATIONS:  No current outpatient prescriptions on file.       Allergies:  Allergies   Allergen Reactions     Biaxin [Clarithromycin]      Codeine      Nabumetone      Penicillins      Seafood      Sulfa Drugs          ROS: 10 point ROS of systems including Constitutional, Eyes, Respiratory, Cardiovascular, Gastroenterology, Genitourinary, Integumentary, Muscularskeletal, Psychiatric were all negative except for pertinent positives noted in my HPI.    EXAM:  /60 (BP Location: Right arm)  Pulse 141  Temp 98.1  F (36.7  C) (Oral)  Resp 18  Ht 1.676 m (5' 6\")  Wt 65.8 kg (145 lb 1 oz)  SpO2 98%  BMI 23.41 kg/m2  Neuro:   General: Lying in Bed; Pleasant; In No Apparent Distress; Hard of Hearing  Mental Status: Oriented to Self; Short Term Memory Loss; Forgetful; Disoriented to Place, Time and Situation; Follows Commands  Speech: Clear; No Dysarthria or Aphasia  Motor: 5/5 in BUE; 5/5 in BLE  Sensory: Intact to LT  Gait: Deferred  No focal tenderness along spinal column    LABS/IMAGING:  Lab Results   Component Value Date    WBC 6.6 11/30/2017     Lab Results   Component Value Date    RBC 3.64 11/30/2017     Lab Results   Component Value Date    HGB 11.0 11/30/2017     Lab Results   Component Value Date    HCT 35.0 11/30/2017     No components found for: MCT  Lab Results   Component Value Date    MCV 96 11/30/2017     Lab Results   Component Value Date    MCH 30.2 11/30/2017     Lab Results   Component Value Date    MCHC 31.4 11/30/2017     Lab Results   Component Value Date    RDW 13.3 11/30/2017     Lab Results   Component Value Date     11/30/2017     Last Basic Metabolic Panel:  Lab Results   Component Value Date     11/30/2017      Lab Results   Component Value Date    POTASSIUM 3.9 11/30/2017     Lab Results   Component Value Date    CHLORIDE 107 11/30/2017     Lab Results   Component Value Date    RADHA 8.8 11/30/2017     Lab Results   Component Value Date    CO2 25 11/30/2017 "     Lab Results   Component Value Date    BUN 14 11/30/2017     Lab Results   Component Value Date    CR 0.92 11/30/2017     Lab Results   Component Value Date     11/30/2017       CT Chest/Abdomen/Pelvis w Contrast   Final Result   Abnormal   IMPRESSION:    1. Progression of a compression deformity of the T7 vertebral body   since 4/26/2017 with approximately 50% loss of vertebral body height   anteriorly. This could be a chronic or acute on chronic fracture.   There are no significant retropulsed fragments or associated spinal   canal narrowing.   2. Patchy linear pulmonary opacities with bronchiectasis and mucous   plugging, as well as a few areas of tree-in-bud nodularity. The   majority of the findings likely represent chronic scarring from prior   infection, although the tree-in-bud nodularity is compatible with   acute inflammation or infection.   3. Moderate intra and extrahepatic biliary ductal dilation has   progressed since 2004. This is most likely due to a combination of   age-related changes and reservoir effect following cholecystectomy.   4. Indeterminate cystic lesion of the tail of the pancreas, stable   since 2004. This most likely represents a benign cyst, possible   sequelae from prior infection or inflammation. Cystic pancreatic   neoplasm is considered much less likely given the stability over   greater than a decade.   5. Diffuse renal cortical thinning and focal cortical scarring   compatible with sequela of prior infectious or ischemic insult.         [Urgent Result: Age-indeterminate progression of a T7 compression   deformity]      Finding was identified on 11/29/2017 10:27 PM.       Dr. Bronson was contacted by Dr. Mejias at 11/29/2017 11:03 PM and   verbalized understanding of the urgent finding.              I have personally reviewed the examination and initial interpretation   and I agree with the findings.      CARRI LAUGHLIN MD        ASSESSMENT: Ms. Garcia is a very  pleasant 89 year old female with Dementia who is s/p Fall who was found to have an interval worsening T7 Compression Fracture without compromise of the Spinal Canal. She appears to be asymptomatic from the fracture at this time.     RECOMMENDATIONS:  - No Neurosurgical Intervention Recommended at this time  - No indication for TLSO brace at this time given patient's age, dementia, and the fact that she is asymptomatic  - Recommend management of Osteoporosis  - Recommend PT/OT Evaluations  - Neurosurgery will sign-off at this time    Isa Antonio, SOPHIE-BC, CCRN, CNRN  Department of Neurosurgery  Pager: [phone]    The patient was discussed and reviewed with Chief Neurosurgery Resident, Dr. Bruno Lee, and staff Neurosurgeon, Dr. Joe Sinha who are in agreement with the plan of care as outlined above. Please do not hesitate to call with questions or concerns.

## 2017-11-30 NOTE — H&P
[facility], [place]    History and Physical / Consult note: Trauma Service     Date of Admission:  11/29/2017    Time of Admission/Consult Request (page/call): 7:30pm 11/29    Time of my evaluation: 7:45pm 11/29  Consulting services:  Oral Maxillofacial - Called by ED  (facial trauma)    Assessment & Plan   Trauma mechanism: unwitnessed fall  Time/date of injury: ~1pm, 11/29   Known Injuries:  1. Right supraorbital fracture with displacement into frontal sinus  2. Facial laceration    Other diagnoses:   1. Memory loss  2. Hypothyroidism  3. DM2  4. HL  5. HTN  6. H/o NSTEMI  7. Hearing loss    PLAN:     Neuro: Pain - acet q8h, tramadol q4h prn.   ENT: Orbital fracture - OMFS consulted; recommend sinus precautions, antibiotics for facial lac, and follow-up in clinic. ?Peridontitis - CT max face findings concerning for peridontitis, f/u with Dentist as outpatient.  CV: Tachycardia - EKG with sinus tach and prolonged QTc (536s), has received 1L NS, will give additional 1L NS then MIVF, tele overnight. Hold PTA aspirin pending work-up.  Heme: Hg 12.4, recheck in am.  Pulm: No issues. Encourage pulm toilet.  GI/FEN: NPO pending work-up and observation.  Renal/: Monitor I&O.  Endocrine: Hypothyroidism - PTA levo.  ID: Antibiotics for soft tissue infection prophylaxis given facial lac (per OMFS); clindamycin ordered pending OFMS final recs. Check UA.   MSK: Up with assist. PT/OT in am.   Prophylaxis: SCDs, stool softeners.     Seen with Dr. Harmon, trauma staff.  Sarita Bronson MD ([id number])  Surgery  - - - - - -    CHIEF COMPLAINT: fall    HISTORY OF PRESENT ILLNESS: Ms. Olga Zelaya is a 89-year-old woman with memory loss, hearing loss, HTN, HL, and DM2 who presents to [facility] ED from [facility] after a fall at her memory care facility. The patient does not recall the fall however per her family, she was found down in the hallway of her facility. It appeared she was ambulating without the  "aide of her walker. Uncertain if LOC occurred and no additional details regarding the event are available.     At present, the patient reports pain \"all over\". Does note pain in her neck (\"behind my [right] ear\") and right shoulder. Her family states she has also reported pain near her right eye. The patient states her vision from her right eye is diminished.    REVIEW OF SYSTEMS: As noted above. Full ROS complicated by patient's memory loss and hearing loss. Her daughter does state that the patient does occasionally have dizziness.     PAST MEDICAL HISTORY:   DM2  HL  HTN  Memory loss  Hearing loss  Hypothyroidism    SURGICAL HISTORY:   Past Surgical History:   Procedure Laterality Date     APPENDECTOMY       BIOPSY ARTERY TEMPORAL Left 4/28/2017    Procedure: BIOPSY ARTERY TEMPORAL;  LEFT TEMPORAL ARTERY BIOPSY.;  Surgeon: Jem Jaramillo MD;  Location: [facility] OR     CHOLECYSTECTOMY         SOCIAL HISTORY: Tob - daughter denies. Etoh - daughter denies. Lives in a memory care facility. Daughter lives in the area.     FAMILY HISTORY: No bleeding/clotting disorders.    ALLERGIES:   Allergies   Allergen Reactions     Biaxin [Clarithromycin]      Codeine      Nabumetone      Penicillins      Seafood      Sulfa Drugs        MEDICATIONS:  Reviewed from memory care facility records.   Aspirin 81 mg  Levothyroxine 88 mch  Acetaminophen 1000 mg q8h  Tramadol 25 mg bid  Vit C 1000 mg qd  Vitamin D3 1000 IU qd    PHYSICAL EXAM:   /89  Pulse 115  Temp 98.3  F (36.8  C) (Oral)  Resp 19  Ht 1.676 m (5' 6\")  SpO2 96%  General: Alert, anxious elderly white woman, lying in bed.   HEENT: Normocephalic. Ecchymosis and edema surrounding right eye; eyelids approximated due to edema. Pupils symmetric and reactive. EOMs intact. Unable to participate in full CN evaluation.  Spine: No c/t/l spine ttp however pt has ttp at the base of her skull.  Chest wall: Symmetric thorax.   Respiratory: Non-labored breathing. Lung sounds " clear to auscultation bilaterally.   Cardiovascular: Tachycardia (up to 130s), regular rhythm.   Gastrointestinal: Abdomen soft, non-distended, mildly ttp in suprapubic area and RUQ.   Extremities: Moving all four extremities. No limb deformities. No pedal edema. Appropriate ROM. No bony tenderness. Ecchymosis at base of right thumb.   Skin: As noted above. 3 cm curvilinear laceration inferior to right eye - surgical glue in place.     Data     Results for orders placed or performed during the hospital encounter of 11/29/17 (from the past 24 hour(s))   INR   Result Value Ref Range    INR 1.04 0.86 - 1.14   CBC with platelets differential   Result Value Ref Range    WBC 8.8 4.0 - 11.0 10e9/L    RBC Count 4.06 3.8 - 5.2 10e12/L    Hemoglobin 12.4 11.7 - 15.7 g/dL    Hematocrit 38.5 35.0 - 47.0 %    MCV 95 78 - 100 fl    MCH 30.5 26.5 - 33.0 pg    MCHC 32.2 31.5 - 36.5 g/dL    RDW 13.1 10.0 - 15.0 %    Platelet Count 313 150 - 450 10e9/L    Diff Method Automated Method     % Neutrophils 72.3 %    % Lymphocytes 21.1 %    % Monocytes 4.8 %    % Eosinophils 0.9 %    % Basophils 0.3 %    % Immature Granulocytes 0.6 %    Nucleated RBCs 0 0 /100    Absolute Neutrophil 6.3 1.6 - 8.3 10e9/L    Absolute Lymphocytes 1.9 0.8 - 5.3 10e9/L    Absolute Monocytes 0.4 0.0 - 1.3 10e9/L    Absolute Eosinophils 0.1 0.0 - 0.7 10e9/L    Absolute Basophils 0.0 0.0 - 0.2 10e9/L    Abs Immature Granulocytes 0.1 0 - 0.4 10e9/L    Absolute Nucleated RBC 0.0    Troponin I   Result Value Ref Range    Troponin I ES 0.019 0.000 - 0.045 ug/L   Partial thromboplastin time   Result Value Ref Range    PTT 33 22 - 37 sec       Studies:  CT Chest/Abdomen/Pelvis w Contrast    (Results Pending)     CT head - no ICH  CT c spine - degenerative change  CT max face - right supraorbital fracture with displacement into the frontal sinus, findings concerning for peridontitis  Right shoulder xray - no acute pathology

## 2017-11-30 NOTE — UTILIZATION REVIEW
[facility]   Admission Status; Secondary Review Determination     Admission Date: 11/29/2017  7:06 PM  Date of Review: 11/30/2017     Under the authority of the Utilization Management Committee, the utilization review process indicated a secondary review on the above patient.  The review outcome is based on review of the medical records, discussions with staff, and applying clinical experience noted on the date of the review.       (x) Observation Status Appropriate - This patient does not meet hospital inpatient criteria and is placed in observation status.    RATIONALE FOR DETERMINATION     89 year old female with a history of dementia, diabetes mellitus type 2, hypertension, hyperlipidemia, H/o NSTEMI and living in memory care facility presents with fall suffering supraorbital fracture with displacement into frontal sinus, facial laceration and found to have progression of T12 compression fracture of unclear acuity. Orbital fracture is non surgical. The severity of illness, intensity of service provided, expected LOS and risk for adverse outcome make the care appropriate for further observation; however, doesn't meet criteria for hospital inpatient admission. Discussed earlier with Dr. Bowden who concurs with observation.       The information on this document is developed by the utilization review team in order for the business office to ensure compliance.  This only denotes the appropriateness of proper admission status and does not reflect the quality of care rendered.         The definitions of Inpatient Status and Observation Status used in making the determination above are those provided in the CMS Coverage Manual, Chapter 1 and Chapter 6, section 70.4.      Sincerely,     João Guzmán MD    Physician Advisor - Utilization Review  [facility].

## 2017-11-30 NOTE — ED NOTES
Pt transferred to [facility] via ambulance for orbital fracture after fall this afternoon at home.  Pt alert but confused.  Family at the bedside.

## 2017-11-30 NOTE — PROGRESS NOTES
11/30/17 1300   Quick Adds   Type of Visit Initial PT Evaluation   Living Environment   Lives With facility resident   Living Arrangements assisted living  (memory care)   Home Accessibility no concerns   Number of Stairs to Enter Home 0   Number of Stairs Within Home 0   Transportation Available family or friend will provide;car   Living Environment Comment Facility provides 24 hour assistance as necessary   Self-Care   Usual Activity Tolerance moderate   Current Activity Tolerance moderate   Regular Exercise yes   Activity/Exercise Type other (see comments)  (PT/OT)   Exercise Amount/Frequency 2 times/wk   Equipment Currently Used at Home walker, standard;grab bar;shower chair   Activity/Exercise/Self-Care Comment Hobbies: crossword puzzels   Functional Level Prior   Ambulation 1-->assistive equipment   Transferring 1-->assistive equipment   Toileting 1-->assistive equipment   Bathing 3-->assistive equipment and person   Dressing 1-->assistive equipment   Eating 0-->independent   Communication 0-->understands/communicates without difficulty  (very hard of hearing)   Swallowing 0-->swallows foods/liquids without difficulty   Cognition 1 - attention or memory deficits   Fall history within last six months yes   Number of times patient has fallen within last six months 2   Which of the above functional risks had a recent onset or change? fall history   Prior Functional Level Comment Pt lives at assisted living facility on memory care unit, has assistance with ADLs as necessary and uses a FWW at baseline   General Information   Onset of Illness/Injury or Date of Surgery - Date 11/29/17   Patient/Family Goals Statement Return to prior living arrangements   Pertinent History of Current Problem (include personal factors and/or comorbidities that impact the POC) Pt is a 89-year-old woman with memory loss, hearing loss, HTN, HL, and DM2 who presents to [facility] ED from [facility] ED after a fall at her memory care facility. The  patient does not recall the fall however per her family, she was found down in the hallway of her facility. It appeared she was ambulating without the aide of her walker. She sustained a right supraorbital fracture with displacement into frontal sinus and facial lacerations.   Precautions/Limitations fall precautions   General Observations Pt in bed preparing to eat with daughter at bed side upon entry.  Daughter provided hx.   Cognitive Status Examination   Orientation orientation to person, place and time   Level of Consciousness alert   Follows Commands and Answers Questions 100% of the time;able to follow single-step instructions   Personal Safety and Judgment impaired;at risk behaviors demonstrated   Memory impaired   Cognitive Comment At baseline pt makes poor/unsafe decisions secondary to impaired memory.  Hx provided by pt's daughter due to pt's severe hearing loss.   Pain Assessment   Patient Currently in Pain No   Integumentary/Edema   Integumentary/Edema other (describe)   Integumentary/Edema Comments facial bruising, swollen R eye    Posture    Posture Forward head position;Protracted shoulders   Range of Motion (ROM)   ROM Comment B UE and LE WFL per functional exam.   Strength   Strength Comments B LE WFL per functional exam- pt able to evenly bear weight through LEs, no evidence of knee buckeling.   Bed Mobility   Bed Mobility Comments Pt ModIND supine>sit with HOB elevated and use of bed rails, pt scoots EOB IND   Transfer Skills   Transfer Comments Pt performs sit<>stand with SBA and FWW, requires cuing to use walker.   Gait   Gait Comments Pt amb 70' with CGA -min A and FWW   Balance   Balance Comments Impaired dynamic balance, requires min A during gait to keep COG over SMITAH, difficulty performing turn   Sensory Examination   Sensory Perception no deficits were identified   General Therapy Interventions   Planned Therapy Interventions balance training;neuromuscular  "re-education;strengthening;transfer training;risk factor education;progressive activity/exercise   Clinical Impression   Criteria for Skilled Therapeutic Intervention yes, treatment indicated   PT Diagnosis Impaired functional mobility   Influenced by the following impairments impaired balance, impaired memory, decreased activity tolerance   Functional limitations due to impairments gait, transfers, bathing   Clinical Presentation Stable/Uncomplicated   Clinical Presentation Rationale Pt's current status   Clinical Decision Making (Complexity) Low complexity   Therapy Frequency` 3 times/week   Predicted Duration of Therapy Intervention (days/wks) 2 weeks    Anticipated Discharge Disposition Long Term Care Facility   Risk & Benefits of therapy have been explained Yes   Patient, Family & other staff in agreement with plan of care Yes   [facility] AM-PAC  \"6 Clicks\" V.2 Basic Mobility Inpatient Short Form   1. Turning from your back to your side while in a flat bed without using bedrails? 4 - None   2. Moving from lying on your back to sitting on the side of a flat bed without using bedrails? 3 - A Little   3. Moving to and from a bed to a chair (including a wheelchair)? 3 - A Little   4. Standing up from a chair using your arms (e.g., wheelchair, or bedside chair)? 4 - None   5. To walk in hospital room? 3 - A Little   6. Climbing 3-5 steps with a railing? 2 - A Lot   Basic Mobility Raw Score (Score out of 24.Lower scores equate to lower levels of function) 19   Total Evaluation Time   Total Evaluation Time (Minutes) 12     "

## 2017-11-30 NOTE — ED PROVIDER NOTES
[place] EMERGENCY DEPARTMENT ([facility])  11/29/17   History     Chief Complaint   Patient presents with     Trauma     The history is provided by a relative, medical records and the patient.     Olga Zelaya is a 89 year old female with a medical history significant for dementia, type 2 diabetes mellitus, hypertension and hyperlipidemia who presents to the Emergency Department for evaluation post fall. The patient was in her apartment today when she had an unwitnessed fall. She is not able to remember what happened; and does not know if she tripped or lost consciousness. The patient was taken to [facility] Emergency Department and evaluated. She had a CT imaging performed and found to have a fracture of the right superior orbital rim with extension into the right frontal sinus. The patient was then transferred here. The patient here reports she does not remember the fall. Patient has significant swelling and bruising of the right eye, and suffered a laceration under the right eye. She denies any neck pain, and denies any current headache. Patient normally wears glasses, and was wearing them when she fell. Patient is not currently on any anticoagulation medications    CT SCAN OF THE FACE WITHOUT CONTRAST 11/29/2017  IMPRESSION:   1. Right supraorbital rim fracture with superior displacement of the  fracture fragment into the frontal sinus. There is orbital fat that  extends into the fracture.  2. Right preorbital soft tissue swelling.  3. No other facial bone fractures identified.  4. Small hemorrhage layer within the right maxillary sinus.  5. Odontogenic disease.    CT SCAN OF THE HEAD WITHOUT CONTRAST   11/29/2017  IMPRESSION:     1. No evidence of acute intracranial hemorrhage, mass, or herniation.  2. There is generalized atrophy of the brain. White matter changes are  present in the cerebral hemispheres that are consistent with small  vessel ischemic disease in this age patient.   3.  "Small fluid layer within the right maxillary sinus. Right  preorbital soft tissue swelling. Please see dedicated facial bone CT  for further details.    CT CERVICAL SPINE WITHOUT CONTRAST November 29, 2017  IMPRESSION:   1. No evidence of acute fracture or subluxation in the cervical spine.  2. Multilevel degenerative changes throughout the cervical spine.     XR SHOULDER RT G/E 3 VW 11/29/2017   IMPRESSION: No fracture or dislocation identified in the right  shoulder. Moderate right acromioclavicular degenerative change. No  significant soft tissue swelling.    I have reviewed the Medications, Allergies, Past Medical and Surgical History, and Social History in the Epic system.    Review of Systems   Constitutional: Negative for fever.   HENT: Positive for facial swelling (around right eye) and hearing loss (chronic). Negative for congestion and dental problem.         Positive for pain around right eye   Eyes: Positive for visual disturbance (right eye swelling). Negative for pain and redness.   Respiratory: Negative for shortness of breath.    Cardiovascular: Negative for chest pain.   Gastrointestinal: Positive for nausea. Negative for abdominal pain and vomiting.   Genitourinary: Negative for difficulty urinating.   Musculoskeletal: Negative for arthralgias, back pain, neck pain and neck stiffness.   Skin: Positive for color change (bruising around right eye) and wound (laceartion below right eye).   Allergic/Immunologic: Negative for immunocompromised state.   Neurological: Negative for weakness and headaches. Syncope: unclear.   Hematological: Does not bruise/bleed easily.   Psychiatric/Behavioral: Positive for decreased concentration and dysphoric mood. Negative for confusion. The patient is nervous/anxious.    All other systems reviewed and are negative.      Physical Exam   BP: (!) 146/104  Pulse: 115  Heart Rate: 114  Temp: 98.3  F (36.8  C)  Resp: 27  Height: 167.6 cm (5' 6\")  SpO2: 98 %      Physical " Exam   Constitutional: She is oriented to person, place, and time. She appears well-developed and well-nourished. She appears distressed.   Patient here with daughter granddaughter and great-granddaughter also.  Patient hard of hearing   HENT:   Head: Normocephalic.   right periorbital ecchymoses with swelling unable to open eye.  Inferior laceration repaired with Dermabond by Karthik wilkinson   Eyes: No scleral icterus.   Neck: Normal range of motion. Neck supple.   Slight posterior tenderness noted   Cardiovascular: Regular rhythm.    Tachycardia   Pulmonary/Chest: No stridor. No respiratory distress.   Abdominal: She exhibits no distension and no mass. There is tenderness (mild). There is no rebound and no guarding.   Musculoskeletal: She exhibits no edema, tenderness or deformity.   Neurological: She is alert and oriented to person, place, and time. She has normal reflexes.   Skin: Skin is warm and dry. No rash noted. She is not diaphoretic. No erythema. No pallor.   Psychiatric:   Patient mildly anxious here hard of hearing   Nursing note and vitals reviewed.      ED Course   7:11 PM  The patient was seen and examined by Miles Hester MD in Room ED23.     ED Course     Patient evaluated in ER.  Patient seen by trauma.  Also OMFS was on for facial trauma admitted to the patient ER also.    Maxillary facial surgery did not recommend any other type of intervention such as antibiotics currently at this point.    Agency by trauma also.  We did do a CT scan of chest ab and pelvis with some mild tachycardia she was uncomfortable did receive 12.5  g of IV fentanyl with some improvement.    CT scan shows age-indeterminate T7 fracture without retropulsion.  No other acute findings noted.  Trauma did see the patient and will admit to their service.    OMFS to follow also.        Procedures             Critical Care time:  none  Trauma:  Level of trauma activation: Trauma evaluation (consult) called at 1910  [facility]  [facility]: not indicated, cleared.  CSpine Clearance: by Nexus Criteria  GCS at arrival: 15  GCS at disposition: unchanged  Full Primary and Secondary survey with appropriate immobilization of spine completed in exam section.  Consults prior to admission or transfer: OMFS called at 1928  Procedures done in the ED: CT chest ab pelvis          Labs Ordered and Resulted from Time of ED Arrival Up to the Time of Departure from the ED   INR   CBC WITH PLATELETS DIFFERENTIAL   TROPONIN I   PARTIAL THROMBOPLASTIN TIME     Results for orders placed or performed during the hospital encounter of 11/29/17   CT Chest/Abdomen/Pelvis w Contrast   Result Value Ref Range    Radiologist flags Age-indeterminate progression of a T7 compression (Urgent)     Narrative    PRELIM  1. Progression of a compression deformity of the T7 vertebral body  since 4/26/2017 with approximately 50% loss of vertebral body height  anteriorly. This could be a chronic or acute on chronic fracture.  There there are no retropulsed fragments or associated spinal canal  narrowing.  2. Patchy linear pulmonary opacities with bronchiectasis and mucous  plugging, as well as a few areas of tree-in-bud nodularity. The  majority of the findings likely represent chronic scarring from prior  infection, although the tree-in-bud nodularity is compatible with  acute inflammation or infection.  3. Moderate intra and extrahepatic biliary ductal dilation has  progressed since 2004. This is most likely due to a combination of  age-related changes and reservoir effect following cholecystectomy.  4. Indeterminate cystic lesion of the tail of the pancreas, stable  since 2004. This most likely represents a benign cyst, possible  sequelae from prior infection or inflammation. Cystic pancreatic  neoplasm is considered much less likely given the stability over  greater than a decade.  5. Diffuse renal cortical thinning and focal cortical scarring  compatible with sequela of  prior infectious or ischemic insult.      [Urgent Result: Age-indeterminate progression of a T7 compression  deformity]    Finding was identified on 11/29/2017 10:27 PM.     Dr. Bronson was contacted by Dr. Mejias at 11/29/2017 11:03 PM and  verbalized understanding of the urgent finding.         INR   Result Value Ref Range    INR 1.04 0.86 - 1.14   CBC with platelets differential   Result Value Ref Range    WBC 8.8 4.0 - 11.0 10e9/L    RBC Count 4.06 3.8 - 5.2 10e12/L    Hemoglobin 12.4 11.7 - 15.7 g/dL    Hematocrit 38.5 35.0 - 47.0 %    MCV 95 78 - 100 fl    MCH 30.5 26.5 - 33.0 pg    MCHC 32.2 31.5 - 36.5 g/dL    RDW 13.1 10.0 - 15.0 %    Platelet Count 313 150 - 450 10e9/L    Diff Method Automated Method     % Neutrophils 72.3 %    % Lymphocytes 21.1 %    % Monocytes 4.8 %    % Eosinophils 0.9 %    % Basophils 0.3 %    % Immature Granulocytes 0.6 %    Nucleated RBCs 0 0 /100    Absolute Neutrophil 6.3 1.6 - 8.3 10e9/L    Absolute Lymphocytes 1.9 0.8 - 5.3 10e9/L    Absolute Monocytes 0.4 0.0 - 1.3 10e9/L    Absolute Eosinophils 0.1 0.0 - 0.7 10e9/L    Absolute Basophils 0.0 0.0 - 0.2 10e9/L    Abs Immature Granulocytes 0.1 0 - 0.4 10e9/L    Absolute Nucleated RBC 0.0    Troponin I   Result Value Ref Range    Troponin I ES 0.019 0.000 - 0.045 ug/L   Partial thromboplastin time   Result Value Ref Range    PTT 33 22 - 37 sec         Consults  Other (Comment): Called (Facial Trauma) (11/29/17 1928)    Assessments & Plan (with Medical Decision Making)  89-year-old female who had supposedly a mechanical fall earlier today evaluated at [facility] who sustained a right periorbital injury with the superior rim fracture with some displacement of the fracture into the sinus.  Patient is not on anticoagulants head CT and CT C-spine does not show acute findings.  Patient transferred to the ER for evaluation by trauma along with this oral maxillofacial surgery did see the patient.  No acute intervention recommended CT  scan of chest abdomen pelvis were done with age-indeterminate T7 fracture.  Patient otherwise stable did receive fentanyl ×1 IV dose in the ER patient otherwise stable will be admitted to trauma service overnight.  No antibodies were indicated or recommended per oral surgery but consider clindamycin IV per trauma recommendation.           I have reviewed the nursing notes.    I have reviewed the findings, diagnosis, plan and need for follow up with the patient.    Current Discharge Medication List          Final diagnoses:   Fall   Closed fracture of seventh thoracic vertebra, unspecified fracture morphology, sequela   Closed fracture of orbit, initial encounter (H)   Facial laceration, initial encounter   Other specified hearing loss of both ears     I, Samuel Cowan, am serving as a trained medical scribe to document services personally performed by Miles Hester MD, based on the provider's statements to me.   I, Miles Hester MD, was physically present and have reviewed and verified the accuracy of this note documented by Samuel Cowan.    11/29/2017   [facility] EMERGENCY DEPARTMENT        This note was created at least in part by the use of dragon voice dictation system. Inadvertent typographical errors may still exist.  Miles Hester MD.         Miles Hester MD  11/30/17 0133

## 2017-11-30 NOTE — PLAN OF CARE
"Problem: Patient Care Overview  Goal: Plan of Care/Patient Progress Review  Outcome: No Change  /83 (BP Location: Left arm)  Pulse 115  Temp 98.1  F (36.7  C) (Oral)  Resp 18  Ht 1.676 m (5' 6\")  Wt 65.8 kg (145 lb 1 oz)  SpO2 99%  BMI 23.41 kg/m2    Cognitive: Alert. Oriented to self.   Cardiac: Sinus tach sustains 100-110's. 's.   Resp: 1L NC  GI/: Due to void  Diet/Appetite: NPO  Skin: L arm skin tear, scabbed and VERNELL. R eye bruising and edematous.   Access: R piv, saline locked.   Activity: Up with assist of 1-2?  Pain: Denies    Pt very hard of hearing. L ear is best to talk into per pt.     Will continue with plan of care and notify MD of any changes.       "

## 2017-11-30 NOTE — PROGRESS NOTES
"ORAL & MAXILLOFACIAL SURGERY CONSULTATION   Name: Olga Zelaya  MRN: [id number]  : 1923  Date of Service: 2017    OMFS consulted by ED regarding facial trauma.    ASSESSMENT:  89 year old female with right supra orbital rim fracture superiorly displaced into frontal sinus without entrapment or other facial fractures noted also with approximately 2.5 cm laceration overlying the lateral inferior infra orbital rim extending inferiorly previously repaired s/p unwitnessed fall.    RECOMMENDATIONS:  1. No surgical intervention recommended at this time  2. Sinus precautions for 2 weeks including:   - No sucking through a straw   - Sneeze with mouth open   - Avoid blowing nose  3. Pain management by primary team  4. Follow up in Oral and Maxillofacial Surgery clinic in 10-14 days    The patient's case was discussed with Chief Resident, Dr. Blanton who will discuss case with staff surgeon, Dr. Lundberg.     CHIEF COMPLAINT: \"I fell and hit my head\"    HISTORY OF PRESENT ILLNESS:      89 year old female who presents to the ED following a fall at patient's memory care facility. She states that she cannot remember the incident but patient's daughter stated that patient attempted to walk without aid of her walker. Patient does not remember incident and is unsure if she lost conciousness.    Patient was initially taken to [facility] ED where facial laceration repair with dermabond. Patient states that she has pain surrounding her right eye in the area of the approximately 2.5 cm laceration and also over the supra orbital rim near the area of the known fracture. She states that it is difficult to open her eye fully due to swelling.       PAST MEDICAL HISTORY:  Past Medical History:   Diagnosis Date     Dementia      Diabetes mellitus (H)      High cholesterol      Hypothyroid      PAST SURGICAL HISTORY:  Past Surgical History:   Procedure Laterality Date     APPENDECTOMY       BIOPSY ARTERY TEMPORAL Left 2017    " "Procedure: BIOPSY ARTERY TEMPORAL;  LEFT TEMPORAL ARTERY BIOPSY.;  Surgeon: Jem Jaramillo MD;  Location: [facility] OR     CHOLECYSTECTOMY       PTA MEDICATIONS:  No current facility-administered medications for this encounter.      Current Outpatient Prescriptions   Medication     traMADol (ULTRAM) 50 MG tablet     ASPIRIN PO     NITROGLYCERIN SL     LEVOTHYROXINE SODIUM PO     ASCORBIC ACID PO     ACETAMINOPHEN PO     ISOSORBIDE MONONITRATE PO     mineral oil enema     magnesium hydroxide (MILK OF MAGNESIA) 400 MG/5ML suspension     sennosides (SENOKOT) 8.6 MG tablet     cholecalciferol (VITAMIN D) 1000 UNIT tablet          ALLERGIES:     Allergies   Allergen Reactions     Biaxin [Clarithromycin]      Codeine      Nabumetone      Penicillins      Seafood      Sulfa Drugs         FAMILY HISTORY:  Family History   Problem Relation Age of Onset     DIABETES Mother      SOCIAL HISTORY  Tobacco: daughter denies  Alcohol:daughter denies  Illicit drugs: daughter denies  Social History     Social History     Marital status:      Spouse name: N/A     Number of children: N/A     Years of education: N/A     Occupational History     Not on file.     Social History Main Topics     Smoking status: Never Smoker     Smokeless tobacco: Not on file     Alcohol use No     Drug use: No     Sexual activity: Not on file     Other Topics Concern     Not on file     Social History Narrative     REVIEW OF SYSTEMS:  10 point review of systems completed with help of patient's daughter and found to be negative except except as noted in HPI.     OBJECTIVE/PHYSICAL EXAMINATION:  Vitals: Blood pressure 128/73, pulse 115, temperature 98.3  F (36.8  C), temperature source Oral, resp. rate 20, height 1.676 m (5' 6\"), SpO2 95 %.  Constitutional: Alert, anxious, mildly distressed  HEENT: There are signs of external trauma, including ecchymosis surrounding right eye and previously repaired (glued with dermabond) approximately 2.5 cm " laceration overlying the lateral inferior infraorbital rim area.       Ears: External ears are intact and atraumatic, tympanic membranes intact bilaterally      Eyes: Pupils equal round and reactive to light, Extraocular eye movement intact, no entrapment, no scleral hemmorhage      Nose: External alae are normal, there is no hemorrhage, septum midline, no septal hematoma, no crepitus/deviation of the nasal dorsum      Mouth:   The buccal vestibules are non-tender to palpation. The dentition is in moderate disrepair including a fractured tooth #11. Unknown if tooth fractured during fall, but buccal mucosa intact without signs of abrasion or intraoral trauma/ lacerations. Maxilla nonmobile on exam, bilateral compression and flexion of Mandible does not elicit painful response or movement.  No blood in saliva. Floor of mouth soft nontender, nondistended. Tongue is WNL , Uvula is midline, no lateral pharyngeal swelling. Mucosal membranes are dry, Oral Hygiene is fair to poor with significant plaque noted. TMJ non-tender bilaterally.  Neck: Soft, supple, no lymphadenopathy. Cervical collar not present.  Cardiovascular: tachycardic, no murmurs appreciated  Pulmonary: CTAB  Extemities: There are not signs of external trauma, pt moves all extremities equally  Neurologic: EOMI, PERRL, facial sensation/movement symmetric (V2/V3 intact), hearing loss present, uvula midline, tongue midline on protrusion.  Skin: clean, intact, with previously repaired 2.5 cm facial laceration     LABORATORY, PATHOLOGY, AND RADIOLOGY DATA:  Lab results:   CBC RESULTS:   Recent Labs   Lab Test  11/29/17 1948   WBC  8.8   RBC  4.06   HGB  12.4   HCT  38.5   MCV  95   MCH  30.5   MCHC  32.2   RDW  13.1   PLT  313       Last Basic Metabolic Panel:  Lab Results   Component Value Date     11/29/2017      Lab Results   Component Value Date    POTASSIUM 4.1 11/29/2017     Lab Results   Component Value Date    CHLORIDE 98 11/29/2017     Lab  Results   Component Value Date    RADHA 9.6 2017     Lab Results   Component Value Date    CO2 27 2017     Lab Results   Component Value Date    BUN 20 2017     Lab Results   Component Value Date    CR 0.97 2017     Lab Results   Component Value Date     2017     Imaging:  CT SCAN OF THE FACE WITHOUT CONTRAST 2017 3:39 PM     IMPRESSION (Radiology read):   1. Right supraorbital rim fracture with superior displacement of the  fracture fragment into the frontal sinus. There is orbital fat that  extends into the fracture.  2. Right preorbital soft tissue swelling.  3. No other facial bone fractures identified.  4. Small hemorrhage layer within the right maxillary sinus.  5. Odontogenic disease.     SIGNATURE:  Doug Cortes DDS  Oral & Maxillofacial Surgery Resident, PGY-1  [phone]

## 2017-12-01 VITALS
WEIGHT: 147.27 LBS | HEART RATE: 115 BPM | OXYGEN SATURATION: 97 % | SYSTOLIC BLOOD PRESSURE: 140 MMHG | HEIGHT: 66 IN | RESPIRATION RATE: 20 BRPM | BODY MASS INDEX: 23.67 KG/M2 | TEMPERATURE: 98.4 F | DIASTOLIC BLOOD PRESSURE: 95 MMHG

## 2017-12-01 LAB
ANION GAP SERPL CALCULATED.3IONS-SCNC: 7 MMOL/L (ref 3–14)
BUN SERPL-MCNC: 14 MG/DL (ref 7–30)
CALCIUM SERPL-MCNC: 8.2 MG/DL (ref 8.5–10.1)
CHLORIDE SERPL-SCNC: 113 MMOL/L (ref 94–109)
CO2 SERPL-SCNC: 23 MMOL/L (ref 20–32)
CREAT SERPL-MCNC: 0.93 MG/DL (ref 0.52–1.04)
ERYTHROCYTE [DISTWIDTH] IN BLOOD BY AUTOMATED COUNT: 13.6 % (ref 10–15)
GFR SERPL CREATININE-BSD FRML MDRD: 56 ML/MIN/1.7M2
GLUCOSE SERPL-MCNC: 111 MG/DL (ref 70–99)
HCT VFR BLD AUTO: 31.9 % (ref 35–47)
HGB BLD-MCNC: 9.9 G/DL (ref 11.7–15.7)
INTERPRETATION ECG - MUSE: NORMAL
MAGNESIUM SERPL-MCNC: 1.7 MG/DL (ref 1.6–2.3)
MCH RBC QN AUTO: 30 PG (ref 26.5–33)
MCHC RBC AUTO-ENTMCNC: 31 G/DL (ref 31.5–36.5)
MCV RBC AUTO: 97 FL (ref 78–100)
PHOSPHATE SERPL-MCNC: 3.3 MG/DL (ref 2.5–4.5)
PLATELET # BLD AUTO: 241 10E9/L (ref 150–450)
POTASSIUM SERPL-SCNC: 3.8 MMOL/L (ref 3.4–5.3)
RBC # BLD AUTO: 3.3 10E12/L (ref 3.8–5.2)
SODIUM SERPL-SCNC: 143 MMOL/L (ref 133–144)
WBC # BLD AUTO: 5.5 10E9/L (ref 4–11)

## 2017-12-01 PROCEDURE — S0077 INJECTION, CLINDAMYCIN PHOSP: HCPCS | Performed by: STUDENT IN AN ORGANIZED HEALTH CARE EDUCATION/TRAINING PROGRAM

## 2017-12-01 PROCEDURE — 25000132 ZZH RX MED GY IP 250 OP 250 PS 637: Mod: GY | Performed by: NURSE PRACTITIONER

## 2017-12-01 PROCEDURE — A9270 NON-COVERED ITEM OR SERVICE: HCPCS | Mod: GY | Performed by: NURSE PRACTITIONER

## 2017-12-01 PROCEDURE — 80048 BASIC METABOLIC PNL TOTAL CA: CPT | Performed by: SURGERY

## 2017-12-01 PROCEDURE — 83735 ASSAY OF MAGNESIUM: CPT | Performed by: SURGERY

## 2017-12-01 PROCEDURE — 93005 ELECTROCARDIOGRAM TRACING: CPT

## 2017-12-01 PROCEDURE — 25000125 ZZHC RX 250: Performed by: SURGERY

## 2017-12-01 PROCEDURE — 25000125 ZZHC RX 250: Performed by: STUDENT IN AN ORGANIZED HEALTH CARE EDUCATION/TRAINING PROGRAM

## 2017-12-01 PROCEDURE — 96375 TX/PRO/DX INJ NEW DRUG ADDON: CPT

## 2017-12-01 PROCEDURE — 96376 TX/PRO/DX INJ SAME DRUG ADON: CPT

## 2017-12-01 PROCEDURE — 25000132 ZZH RX MED GY IP 250 OP 250 PS 637: Mod: GY | Performed by: STUDENT IN AN ORGANIZED HEALTH CARE EDUCATION/TRAINING PROGRAM

## 2017-12-01 PROCEDURE — A9270 NON-COVERED ITEM OR SERVICE: HCPCS | Mod: GY | Performed by: SURGERY

## 2017-12-01 PROCEDURE — A9270 NON-COVERED ITEM OR SERVICE: HCPCS | Mod: GY | Performed by: STUDENT IN AN ORGANIZED HEALTH CARE EDUCATION/TRAINING PROGRAM

## 2017-12-01 PROCEDURE — 36415 COLL VENOUS BLD VENIPUNCTURE: CPT | Performed by: SURGERY

## 2017-12-01 PROCEDURE — 85027 COMPLETE CBC AUTOMATED: CPT | Performed by: SURGERY

## 2017-12-01 PROCEDURE — 93010 ELECTROCARDIOGRAM REPORT: CPT | Performed by: INTERNAL MEDICINE

## 2017-12-01 PROCEDURE — 84100 ASSAY OF PHOSPHORUS: CPT | Performed by: SURGERY

## 2017-12-01 PROCEDURE — 25000132 ZZH RX MED GY IP 250 OP 250 PS 637: Mod: GY | Performed by: SURGERY

## 2017-12-01 PROCEDURE — 99223 1ST HOSP IP/OBS HIGH 75: CPT | Performed by: CLINICAL NURSE SPECIALIST

## 2017-12-01 PROCEDURE — 25000128 H RX IP 250 OP 636: Performed by: STUDENT IN AN ORGANIZED HEALTH CARE EDUCATION/TRAINING PROGRAM

## 2017-12-01 PROCEDURE — 40000275 ZZH STATISTIC RCP TIME EA 10 MIN

## 2017-12-01 PROCEDURE — G0378 HOSPITAL OBSERVATION PER HR: HCPCS

## 2017-12-01 RX ORDER — POTASSIUM CHLORIDE 750 MG/1
20 TABLET, EXTENDED RELEASE ORAL ONCE
Status: COMPLETED | OUTPATIENT
Start: 2017-12-01 | End: 2017-12-01

## 2017-12-01 RX ORDER — SODIUM CHLORIDE 9 MG/ML
1000 INJECTION, SOLUTION INTRAVENOUS CONTINUOUS
Status: DISCONTINUED | OUTPATIENT
Start: 2017-12-01 | End: 2017-12-01 | Stop reason: HOSPADM

## 2017-12-01 RX ADMIN — OXYCODONE HYDROCHLORIDE AND ACETAMINOPHEN 1000 MG: 500 TABLET ORAL at 09:22

## 2017-12-01 RX ADMIN — ACETAMINOPHEN 1000 MG: 500 TABLET, FILM COATED ORAL at 13:09

## 2017-12-01 RX ADMIN — ONDANSETRON 4 MG: 2 INJECTION INTRAMUSCULAR; INTRAVENOUS at 12:56

## 2017-12-01 RX ADMIN — ACETAMINOPHEN 1000 MG: 500 TABLET, FILM COATED ORAL at 09:22

## 2017-12-01 RX ADMIN — POTASSIUM CHLORIDE 20 MEQ: 750 TABLET, EXTENDED RELEASE ORAL at 06:14

## 2017-12-01 RX ADMIN — CLINDAMYCIN PHOSPHATE 600 MG: 12 INJECTION, SOLUTION INTRAMUSCULAR; INTRAVENOUS at 04:41

## 2017-12-01 RX ADMIN — Medication 2 G: at 05:40

## 2017-12-01 RX ADMIN — VITAMIN D, TAB 1000IU (100/BT) 1000 UNITS: 25 TAB at 09:22

## 2017-12-01 RX ADMIN — CLINDAMYCIN PHOSPHATE 600 MG: 12 INJECTION, SOLUTION INTRAMUSCULAR; INTRAVENOUS at 12:56

## 2017-12-01 RX ADMIN — LEVOTHYROXINE SODIUM 88 MCG: 88 TABLET ORAL at 09:22

## 2017-12-01 RX ADMIN — SENNOSIDES 1 TABLET: 8.6 TABLET, FILM COATED ORAL at 09:22

## 2017-12-01 NOTE — PLAN OF CARE
Problem: Patient Care Overview  Goal: Plan of Care/Patient Progress Review  Outcome: Improving  Neuro: Alert and oriented to self, disoriented x3. Bed alarm on.  Cardiac: SR-ST with HR 90-low 100s. VSS.   Respiratory: Sating 99% on RA.  GI/: frequent voiding. Passing gas.  Diet/appetite: Tolerating regular diet. Eating well.  Activity:  SBA up to chair and in halls.  Pain: c/o no pain-refused scheduled tylenol.  Skin: facial bruising and swelling, R supraorbital fx. Generalized bruising.  LDA's: piv, ivf, iv abx    Plan: Continue with POC. Notify primary team with changes. Plan to d.c tomorrow.

## 2017-12-01 NOTE — PROGRESS NOTES
Care Coordinator- Discharge Planning Note     Admission Date/Time:  11/29/2017  Attending MD:  Matthew Bowden*     Data  Chart reviewed, discussed with interdisciplinary team.   Patient was admitted for:   1. Fall    2. Closed fracture of seventh thoracic vertebra, unspecified fracture morphology, sequela    3. Closed fracture of orbit, initial encounter (H)    4. Facial laceration, initial encounter    5. Other specified hearing loss of both ears    6. Closed fracture of right orbit, initial encounter (H)    7. Closed fracture of seventh thoracic vertebra, unspecified fracture morphology, initial encounter (H)    8. Bilateral deafness    9. Fall, initial encounter         RNCC Intervention: spoke with Rosa at assisted living, they are able to increase services to include full assistance with walks, every 2 hour toileting and at night check ins.  Also she can have PT/OT at home.  Plan is to discharge her after meeting with her and family.    Plan  Anticipated Discharge Date:  12/01/17    Anticipated Discharge Plan:  Return to previous living situation with increased services      CTS Handoff completed: sharee Arboleda RN, BSN, PHN, RNCCPH: [phone]  Pager: [phone]  Covering for : Hafsa Mary,  6B RNCC

## 2017-12-01 NOTE — PROVIDER NOTIFICATION
"Pt. Monitor showing SVT at . 12-lead EKG ordered showing SVT and 2nd degree AV block type 1. Trauma MD notified. Pt. Asymptomatic. Labs ordered as well as mag replacement. Will continue to monitor.     Last vitals /78 (BP Location: Left arm)  Pulse 100  Temp 97.9  F (36.6  C) (Oral)  Resp 18  Ht 1.676 m (5' 6\")  Wt 66.8 kg (147 lb 4.3 oz)  SpO2 95%  BMI 23.77 kg/m2  "

## 2017-12-01 NOTE — PROGRESS NOTES
Goal: Plan of Care/Patient Progress Review  Outcome: Improving  Neuro: Alert and oriented to self, disoriented x3. Bed alarm on.  Cardiac: 12-lead EKG ordered due to monitor showing SVT. See Notify MD Note. Pt. asymptomatic      Respiratory: Sating 99% on 2L NC while sleeping, otherwise desat into 80's. RA while awake.   GI/: frequent voiding. Passing gas.  Diet/appetite: regular diet  Activity:  SBA up to chair and in halls.  Pain: Pt denies pain  Skin: facial bruising and swelling, R supraorbital fx. Generalized bruising.  LDA's: piv, ivf, iv abx     Obs Goals  Pain control--MET  PT/OT eval --Pending  SW/discharge planning--Pending     Plan: Continue with POC. Notify primary team with changes. Plan to d.c today

## 2017-12-01 NOTE — PROGRESS NOTES
Abnormalities on rhythm strip. ECG shows new Mobitz type 1 with sinus tachycardia. She has had prior 1st degree AV block on ECG. K 3.9 and Mg 1.7 yesterday, not repleted. Ordered 2g Mg sulfate. Stat BMP, Mg, Phos, CBC. Replete electrolytes K>4, Mg>2, P>3. TKO IV (currently running at 100cc/hr).    Gray Madison MD  Trauma

## 2017-12-01 NOTE — PROGRESS NOTES
ADDENDUM 1345: Met w/pt and pt's daughter again. Pt will return to [facility] with increased services (q2h toileting and checks, assistance with ambulating, and night checks). Pt's dtr Linh will transport back to [facility] today.     Social Work: Assessment with Discharge Plan    Patient Name:  Olga Zelaya  :  1923  Age:  89 year old  MRN:  [id number]  Risk/Complexity Score:  none   Completed assessment with:  Pt's daughter Linh Welsh (P: [phone])    Presenting Information   Reason for Referral:  Discharge plan, pt under observation status.  Date of Intake:  2017  Referral Source:  Family  Decision Maker:  Pt's daughter Linh Welsh  Alternate Decision Maker:  Pt's son Alcides Zelaya  Health Care Directive:  Copy in Chart  Living Situation:  Pt resides in the memory care assisted living facility, [facility].  Previous Functional Status: Pt has assistance from facility staff for her AM/PM cares, medications, meals. Pt uses a walker, shower chair, grab bars.  Patient and family understanding of hospitalization:  Pt's daughter has a good understanding of hospitalization and observation status.  Cultural/Language/Spiritual Considerations:  English speaking; pt is [place] and best to speak into left ear.    Physical Health  Reason for Admission:    1. Fall    2. Closed fracture of seventh thoracic vertebra, unspecified fracture morphology, sequela    3. Closed fracture of orbit, initial encounter (H)    4. Facial laceration, initial encounter    5. Other specified hearing loss of both ears    6. Closed fracture of right orbit, initial encounter (H)    7. Closed fracture of seventh thoracic vertebra, unspecified fracture morphology, initial encounter (H)    8. Bilateral deafness    9. Fall, initial encounter      Services Needed/Recommended:  Return to facility with increased services.    Mental Health/Chemical Dependency  Diagnosis:  none  Support/Services in Place:  n/a  Services  Needed/Recommended:  n/a    Support System  Significant relationship at present time:  Pt is , has 3 children, 4 grandchildren, and 7 great-grandchildren.  Family of origin is available for support:  Pt's daughter Linh is very involved and lives in [place]. Pt's son Alcides is supportive and lives in [place].  Other support available:  [facility] staff  Gaps in support system:  Pt has minimal services at the [facility] now.  Patient is caregiver to:  None     Provider Information   Primary Care Physician:  Juan Carlos Perez   [phone]   Clinic:  [facility] PHYSICIAN [facility] [address] [address] / [place]      :  none    Financial   Income Source:  Social Security  Financial Concerns:  none  Insurance:    Payor/Plan Subscriber Name Rel Member # Group #   MEDICARE - [place] MEDICAR* [place]  [id number]       [address] [address]   BCBS - [place]  [id number]      [address] [address]       Discharge Plan   Patient and family discharge goal:  Return to [facility] with increased services.  Provided education on discharge plan:  YES  Patient agreeable to discharge plan:  YES  Barriers to discharge:  Safe dc plan.    Discharge Recommendations   Anticipated Disposition:  Pt is under observation status and is medically stable to dc today. OT signed off. PT recommended return to memory care unit with increased services to better meet functional needs and mitigate risk of falls. Pt's daughter Linh would ideally like pt to return to her [facility] with increased services, knowing that these services would be private pay. Linh is aware that TCU would be private pay as pt is observation and would not have a qualifying hospitalization. RNCC and SW meeting w/pt and pt's daughter at 1330.  Transportation Needs:  Family:  daughter  Name of Transportation Company and Phone:  N/A    OUSMANE Raya, LICSW  6B Intermediate Care Unit  Phone: [phone]  Pager: [phone]

## 2017-12-01 NOTE — PLAN OF CARE
DISCHARGE                         12/1/2017  3:53 PM  ----------------------------------------------------------------------------  Discharged to: Home  Via: private transportation  Accompanied by: Family  Discharge Instructions: keep wound clean and dry, do not need to cover. Watch for signs of infection.   Prescriptions: No new prescriptions   Follow Up Appointments: arranged; information given  Belongings: All sent with pt  IV: d/c'd  Telemetry: d/c'd  Pt exhibits understanding of above discharge instructions; all questions answered. (spoke with pts daughter/POA d/t pt hearing loss/confusion)     Discharge Paperwork: Signed, copied, and sent home with patient.

## 2017-12-01 NOTE — PROGRESS NOTES
Observation goals PER UNIT ROUTINE     Comments: Pain control   PT/OT eval   SW/discharge planning     Goal met, pt not in pain and refusing scheduled tylenol.

## 2017-12-01 NOTE — PROGRESS NOTES
Care Coordinator- Discharge Planning Note     Admission Date/Time:  11/29/2017  Attending MD:  Matthew Bowden*     Data  Chart reviewed, discussed with interdisciplinary team.   Patient was admitted for:   1. Fall    2. Closed fracture of seventh thoracic vertebra, unspecified fracture morphology, sequela    3. Closed fracture of orbit, initial encounter (H)    4. Facial laceration, initial encounter    5. Other specified hearing loss of both ears    6. Closed fracture of right orbit, initial encounter (H)    7. Closed fracture of seventh thoracic vertebra, unspecified fracture morphology, initial encounter (H)    8. Bilateral deafness    9. Fall, initial encounter         RNCC Intervention: Call placed to assisted living, spoke with Ramya to discuss what additional services may be available to the patient, she currently has AM/PM cares, escourts to meals, showers, and medication administration.  Ramya is going to reach out to the DON to have her call back regarding the situation.    Plan  Anticipated Discharge Date:  12/01/17    Anticipated Discharge Plan:  Pending plan of care    CTS Handoff completed: sharee Arboleda RN, BSN, PHN, RNCCPH: [phone]  Pager: [phone]  Covering for : Hafsa Mary,  6B RNCC

## 2017-12-01 NOTE — CONSULTS
[facility], [place]    Palliative Care Consultation Note    Patient: Olga Michael  Date of Admission:  11/29/2017    Requesting provider/team: Becka Fulton APRN CNP  Reason for consult: Goals of care  Decisional support  Patient and family support    Recommendations:  I met with Olga's daughter today to introduce the Palliative Care service. We discussed her recent fall, which resulted in this hospitalization. We also discussed how traumas in the elderly often result in a downhill trajectory for people, and have increased mortality risk at six months. Life prior to this admission was at a memory care facility and she had had a couple of falls prior. She has a current POLST and HCD in chart. Daughter is aware of risks after trauma in the elderly.      Thank you for the opportunity to participate in the care of this patient and family. Please feel free to contact the on-call Palliative provider with any urgent needs.     TAMMI Jimenez CNS  Palliative Care Consult Team  Pager: [phone]    [facility] Inpatient Team Consult pager [phone] (M-F 8-4:30)  After-hours Answering Service [phone]   Palliative Clinic: [phone]       Assessment:  Olga Michael is a 89 year old female with a fall at her memory care facility resulting in right orbital wall fracture.  Symptoms:     Nausea: feeling like she is going to vomit, daughter reports that she has frequent bowel movements, has found zofran helpful   Also reported having a headache  Social:          Living situation: in memory care facility       Support system: daughter, two other children       Hobbies: she enjoys doing cross word puzzles, drinking coffee, and used to love to garden    Advance Care Planning:        Health care directive: yes       Health care agent: lacey cabral (daughter), jose cruz olivia michael (son)       Code Status:  DNR / DNI       POLST Physician orders for  life-sustaining treatment (POLST) form is completed      History of Present Illness   Sources of History:electronic health record and patient's daughter    Olga Zelaya is a 89 year old female who presents 11/29 with a fall at her memory care facility resulting in right orbital wall fracture and facial laceration. She also has a history of HTN. HL, DM2. She also has a history of thoracic vertebrae #7 deformity with compression which neurosurgery managed. Per her daughter, she has had a few falls in her past and understands pretty well the concerns of frequent falls in the elderly population.     Palliative care consulted 11/30 for patient with advanced age after falling at memory care facility resulting in injury.    ROS:  Palliative Symptom Review (0=no symptom/no concern, 1=mild, 2=moderate, 3=severe):  Pain: 1-2  Fatigue: 0  Nausea: 1-2  Constipation: 0  Diarrhea: 0  Depressive Symptoms: 0  Anxiety: 0  Drowsiness: 0  Poor Appetite: 0  Shortness of Breath: 00  Insomnia: 0  Delirium: 0       Past Medical History:   Past Medical History:   Diagnosis Date     Dementia      Diabetes mellitus (H)      High cholesterol      Hypothyroid           Past Surgical History:   Past Surgical History:   Procedure Laterality Date     APPENDECTOMY       BIOPSY ARTERY TEMPORAL Left 4/28/2017    Procedure: BIOPSY ARTERY TEMPORAL;  LEFT TEMPORAL ARTERY BIOPSY.;  Surgeon: Jem Jaramillo MD;  Location: [facility] OR     CHOLECYSTECTOMY               Family History:   Family History   Problem Relation Age of Onset     DIABETES Mother      Family history reviewed       Allergies:   Allergies   Allergen Reactions     Biaxin [Clarithromycin]      Codeine      Nabumetone      Penicillins      Seafood      Sulfa Drugs             Medications:   I have reviewed this patient's medication profile and medications given in the past 24 hours.    Acetaminophen 1,000 mg TID  Senna 1 tablet daily  TUMs 4 times a day PRN  Ondanstron PRN  Ultram  PRN         Physical Exam:   Vital Signs: Temp: 98.1  F (36.7  C) Temp src: Oral BP: 123/68 Pulse: 109 Heart Rate: 134 Resp: 20 SpO2: 99 % O2 Device: Nasal cannula Oxygen Delivery: 2 LPM  Weight: 147 lbs 4.28 oz    Physical Exam:  Constitutional: Awake, alert, cooperative, no apparent distress, bruising to bilateral area surrounding eyes  Lungs: No increased work of breathing  Neurologic: Awake, alert, oriented to self and able to respond to questions appropriately  Neuropsychiatric: Normal affect, mood     Data reviewed:     ROUTINE ICU LABS (Last four results)  CMP  Recent Labs  Lab 12/01/17  0536 11/30/17  1234 11/29/17  1402    140 134   POTASSIUM 3.8 3.9 4.1   CHLORIDE 113* 107 98   CO2 23 25 27   ANIONGAP 7 8 9   * 118* 171*   BUN 14 14 20   CR 0.93 0.92 0.97   GFRESTIMATED 56* 57* 53*   GFRESTBLACK 68 69 65   RADHA 8.2* 8.8 9.6   MAG 1.7 1.7  --    PHOS 3.3 3.4  --    PROTTOTAL  --  6.5*  --    ALBUMIN  --  2.9*  --    BILITOTAL  --  0.6  --    ALKPHOS  --  55  --    AST  --  14  --    ALT  --  9  --      CBC  Recent Labs  Lab 12/01/17  0536 11/30/17  1234 11/29/17 1948 11/29/17  1402   WBC 5.5 6.6 8.8 6.5   RBC 3.30* 3.64* 4.06 4.13   HGB 9.9* 11.0* 12.4 12.6   HCT 31.9* 35.0 38.5 38.8   MCV 97 96 95 94   MCH 30.0 30.2 30.5 30.5   MCHC 31.0* 31.4* 32.2 32.5   RDW 13.6 13.3 13.1 12.8    257 313 367     INR  Recent Labs  Lab 11/29/17 1948   INR 1.04     Arterial Blood GasNo lab results found in last 7 days.      TAMMI Jimenez CNS  Palliative Care Consult Team  Pager: [phone]    [facility] Inpatient Team Consult pager [phone] (M-F 8-4:30)  After-hours Answering Service [phone]  Palliative Clinic: [phone]     Total time spent was 70 minutes,  >50% of time was spent counseling and/or coordination of care regarding goals of care and support.

## 2017-12-01 NOTE — DISCHARGE SUMMARY
[facility], [place]    Discharge Summary  Trauma Surgery Service    Date of Admission:  11/29/2017  Date of Discharge:  12/1/2017  Discharging Provider: Becka Fulton CNP/ Dr. Harmon  Date of Service (when I saw the patient): 12/01/17    Primary Provider: Juan Carlos Perez  Primary Care clinic: [facility] [address]  [facility] [address]  Phone: [phone]  Fax number: [phone]     Discharge Diagnoses   Active Problems:    Fall    Orbital wall fracture (H)  T 7 deformity and compression of unknown chronicity      Hospital Course   History of Present Illness: Ms. Olga Zelaya is a 89-year-old woman with memory loss, hearing loss, Hypertesion, and Diabetes Mellitus type II who presents to  ED after a fall at her memory care facility. The patient does not recall the fall however per her family, she was found down in the hallway of her facility. It appeared she was ambulating without the aide of her walker. It is uncertain if she had any associated loss of consciousness with the fall. She had significant periorbital ecchymosis and a facial laceration a the time of presentation. She has denied pain.    Right orbital wall fracture  The patient sustained the above injury as a result of fall.  She was seen by the Trauma Resource Nurse and injury prevention education was performed.  The mechanism of injury and factors contributing to the accident were discussed with the patient.  Strategies on how to prevent future accidents were reviewed.  The patient underwent tertiary examination to evaluate for additional injuries.  The systematic review did not find any other injuries. She was evaluated by the Oral Maxillofacial Thoracic Surgery service and her injuries were deemed non operable. She should maintain sinus precautions for 2 weeks including: No sucking through a straw, sneeze with mouth open and void blowing her nose. She is required to follow up in Oral and  Maxillofacial Surgery clinic in 10-14 days.    Thoracic vertebrae # 7 deformity with compression:  Olga Zelaya was evaluated by Neurosurgery and managed non-operatively for her fractures. She was placed on spinal precautions and monitored with serial neurological examinations which remained stable. There was no indication for a TLSO brace at this time given patient's age, dementia, and the fact that she is asymptomatic. She denied pain during her hospital stay.    Palliative Care Consult  The palliative care team was consulted to assist in the care and future life planning regarding the changes the above injuries have created. Often this sort of injury is a clear marker of general decline in the aged population.  During their time with the patient and family, these are the things they focused on this admission: supportive services.      Code Status   Full Code  Physical Exam   Temp: 98.4  F (36.9  C) Temp src: Oral BP: (!) 140/95 Pulse: 115 Heart Rate: 134 Resp: 20 SpO2: 97 % O2 Device: None (Room air) Oxygen Delivery: 2 LPM  Vitals:    11/30/17 0347 12/01/17 0255   Weight: 65.8 kg (145 lb 1 oz) 66.8 kg (147 lb 4.3 oz)     Vital Signs with Ranges  Temp:  [97.6  F (36.4  C)-98.4  F (36.9  C)] 98.4  F (36.9  C)  Pulse:  [] 115  Heart Rate:  [101-134] 134  Resp:  [17-20] 20  BP: (112-140)/(63-95) 140/95  SpO2:  [95 %-100 %] 97 %  I/O last 3 completed shifts:  In: 1690 [P.O.:390; I.V.:1300]  Out: 870 [Urine:870]    Constitutional: Lethargic but arouses to voice, no apparent distress  Eyes: Moderate periorbital ecchymosis, right eye swelling improved. Pupils equal and react briskly.  Respiratory: No increased work of breathing, good air exchange, clear to auscultation bilaterally, no crackles or wheezing.  Cardiovascular: Normal apical impulse, regular rate and rhythm, and no murmur noted.  GI: Normal bowel sounds, soft, non-distended, non-tender, no masses palpated  Lymph/Hematologic: No cervical  "lymphadenopathy   Genitourinary:  No urine to assess  Skin: Frail with moderate facial ecchymosis, facial laceration is well approximated, no drainage.  Musculoskeletal: There is no redness, warmth, or swelling of the joints.  Full range of motion noted.   Neurologic: Very hard of hearing at baseline, oriented to self.   Neuropsychiatric: Calm, normal eye contact, alert, normal affect    Discharge Disposition   Discharged to assisted living  Condition at discharge: Stable  Discharge VS: Blood pressure (!) 140/95, pulse 115, temperature 98.4  F (36.9  C), temperature source Oral, resp. rate 20, height 1.676 m (5' 6\"), weight 66.8 kg (147 lb 4.3 oz), SpO2 97 %.    Consultations This Hospital Stay   OCCUPATIONAL THERAPY ADULT IP CONSULT  NEUROSURGERY ADULT IP CONSULT  ORTHOSIS BRACE IP CONSULT  PALLIATIVE CARE ADULT IP CONSULT    Discharge Orders     General info for SNF   Length of Stay Estimate: Long Term Care  Condition at Discharge: Stable  Level of care:board and care  Rehabilitation Potential: Fair  Admission H&P remains valid and up-to-date: Yes  Recent Chemotherapy: N/A  Use Nursing Home Standing Orders: Yes     Mantoux instructions   Give two-step Mantoux (PPD) Per Facility Policy Yes     Reason for your hospital stay   1. Right supraorbital fracture with displacement into frontal sinus  2. Facial laceration  3. T7 compression /deformity with unknown chronicity     Intake and output   Every shift     Wound care (specify)   Site:   Face  Instructions:  Clean daily with soap and water, no dressing needed     Follow Up and recommended labs and tests   Follow up with your primary care provider for continued medical care and hospital follow up in 5-10 days.     Oral and Maxillofacial Surgeons: Follow up in Oral and Maxillofacial Surgery clinic in 10-14 days  7th Floor [facility] [place] [address]  Appointments: [phone]     Activity - Up with nursing assistance     DNR/DNI     Fall " precautions     Advance Diet as Tolerated   Follow this diet upon discharge: Regular       Discharge Medications   Current Discharge Medication List      CONTINUE these medications which have NOT CHANGED    Details   calcium carbonate (TUMS) 500 MG chewable tablet Take 1 chew tab by mouth 4 times daily as needed for heartburn      ASPIRIN PO Take 81 mg by mouth daily      NITROGLYCERIN SL Place 0.4 mg under the tongue every 5 minutes as needed for chest pain      LEVOTHYROXINE SODIUM PO Take 88 mcg by mouth daily      ASCORBIC ACID PO Take 1,000 mg by mouth daily      ACETAMINOPHEN PO Take 1,000 mg by mouth 3 times daily      magnesium hydroxide (MILK OF MAGNESIA) 400 MG/5ML suspension Take 30 mLs by mouth daily as needed for constipation or heartburn      sennosides (SENOKOT) 8.6 MG tablet Take 1 tablet by mouth daily as needed for constipation      cholecalciferol (VITAMIN D) 1000 UNIT tablet Take 1 tablet by mouth daily.         STOP taking these medications       traMADol (ULTRAM) 50 MG tablet Comments:   Reason for Stopping:             Allergies   Allergies   Allergen Reactions     Biaxin [Clarithromycin]      Codeine      Nabumetone      Penicillins      Seafood      Sulfa Drugs      Data   Most Recent 3 CBC's:  Recent Labs   Lab Test  12/01/17   0536  11/30/17   1234  11/29/17   1948   WBC  5.5  6.6  8.8   HGB  9.9*  11.0*  12.4   MCV  97  96  95   PLT  241  257  313      Most Recent 3 BMP's:  Recent Labs   Lab Test  12/01/17   0536  11/30/17   1234  11/29/17   1402   NA  143  140  134   POTASSIUM  3.8  3.9  4.1   CHLORIDE  113*  107  98   CO2  23  25  27   BUN  14  14  20   CR  0.93  0.92  0.97   ANIONGAP  7  8  9   RADHA  8.2*  8.8  9.6   GLC  111*  118*  171*     Most Recent 2 LFT's:  Recent Labs   Lab Test  11/30/17   1234  02/19/12   2250   AST  14  35   ALT  9  13   ALKPHOS  55  78   BILITOTAL  0.6  1.1     Most Recent INR's and Anticoagulation Dosing History:  Anticoagulation Dose History     Recent  Dosing and Labs Latest Ref Rng & Units 2/19/2012 8/9/2013 11/29/2017    INR 0.86 - 1.14 1.05 1.00 1.04        Most Recent 3 Troponin's:  Recent Labs   Lab Test  11/29/17   1948  04/27/17   0557  04/27/17   0040   TROPI  0.019  0.020  0.022     Most Recent 6 Bacteria Isolates From Any Culture (See EPIC Reports for Culture Details):No lab results found.  Most Recent TSH, T4 and A1c Labs:  Recent Labs   Lab Test  04/26/17   1725   TSH  3.75   A1C  7.3*     Results for orders placed or performed during the hospital encounter of 11/29/17   CT Chest/Abdomen/Pelvis w Contrast     Value    Radiologist flags Age-indeterminate progression of a T7 compression (Urgent)    Narrative    EXAMINATION: CT CHEST/ABDOMEN/PELVIS W CONTRAST, 11/29/2017 10:24 PM    TECHNIQUE:  Helical CT images from the thoracic inlet through the  symphysis pubis were obtained  with contrast. Contrast dose: 104    COMPARISON: Head CT from earlier on the same day, radiograph  4/26/2017, CT 1/8/2004.    HISTORY: Fall with cranial fracture, evaluate for other trauma.    FINDINGS:    Chest:   The mediastinal vasculature is intact. There is no pneumothorax or  pleural effusion. No suspected pulmonary contusion. Heart is mildly  enlarged without pericardial effusion. There are atherosclerotic  vascular calcifications no mediastinal or axillary lymphadenopathy.  The trachea and central airways are clear.    There are scattered consolidative opacities in the lungs, most notably  in the right middle and the bilateral lower lobes. The most focal of  these is an area of linear peribronchial vascular increased  attenuation with associated bronchiectasis in the medial right middle  lobe. There is mucous plugging in the lateral right middle lobe within  expanded peripheral bronchi. There is mild tree-in-bud nodularity in  the medial right upper lobe in general, these findings are new or  increased from the CT in 2004. There is an area of linear subpleural  fibrotic  opacities with subpleural consolidation in the lateral right  lower lobe which is increased since the comparison in 2004. There is  posterior basilar fibroatelectasis.    Abdomen and pelvis:   No free air or free fluid collection. No evidence of acute traumatic  abnormality in the abdomen or pelvis. The hepatic parenchyma is within  normal limits. There is moderate intra and extrahepatic biliary ductal  dilation, increased from 2004, possibly representing reservoir effect  and age-related changes. Spleen is small and otherwise within normal  limits. The pancreas is diffusely atrophic. There is a 1.1 x 0.8 cm  cyst adjacent to or within the tail of the pancreas (series 4, image  116), stable since 2004. The stomach and small bowel are within normal  limits. There is extensive distal colonic diverticulosis without focal  inflammatory changes to suggest diverticulitis. Diffuse renal cortical  thinning with scattered areas of focal cortical thinning. No  hydronephrosis or nephrolithiasis. No abnormal lymph nodes identified.  Concentric vascular calcifications in the aorta and major branch  vessel ostia, without obvious focal stenosis.    Bones and soft tissues:   There is a compression deformity of the T7 vertebral body with  approximately 50% loss of the vertebral body height anteriorly, which  appears progressed from the chest radiograph on 4/26/2017. There are  additional Schmorl's node deformities, some of which may mimic minimal  endplate compression deformities. Otherwise there are diffuse chronic  degenerative changes. Motion artifact in the mid thorax mimics a  sternal fracture, and slightly limits evaluation for small  nondisplaced fractures.      Impression    IMPRESSION:   1. Progression of a compression deformity of the T7 vertebral body  since 4/26/2017 with approximately 50% loss of vertebral body height  anteriorly. This could be a chronic or acute on chronic fracture.  There are no significant  retropulsed fragments or associated spinal  canal narrowing.  2. Patchy linear pulmonary opacities with bronchiectasis and mucous  plugging, as well as a few areas of tree-in-bud nodularity. The  majority of the findings likely represent chronic scarring from prior  infection, although the tree-in-bud nodularity is compatible with  acute inflammation or infection.  3. Moderate intra and extrahepatic biliary ductal dilation has  progressed since 2004. This is most likely due to a combination of  age-related changes and reservoir effect following cholecystectomy.  4. Indeterminate cystic lesion of the tail of the pancreas, stable  since 2004. This most likely represents a benign cyst, possible  sequelae from prior infection or inflammation. Cystic pancreatic  neoplasm is considered much less likely given the stability over  greater than a decade.  5. Diffuse renal cortical thinning and focal cortical scarring  compatible with sequela of prior infectious or ischemic insult.      [Urgent Result: Age-indeterminate progression of a T7 compression  deformity]    Finding was identified on 11/29/2017 10:27 PM.     Dr. Bronson was contacted by Dr. Mejias at 11/29/2017 11:03 PM and  verbalized understanding of the urgent finding.          I have personally reviewed the examination and initial interpretation  and I agree with the findings.    CARRI LAUGHLIN MD       Time Spent on this Encounter   I, Becka Fulton, personally saw the patient today and spent less than or equal to 30 minutes discharging this patient.      We appreciate the opportunity to care for your patient while in the hospital.  Should you have any questions about their injuries or this discharge summary our contact information is below.    Trauma Services  [facility]   Department of Critical Care and Acute Care Surgery  [address] [address]  Office: [phone]

## 2017-12-01 NOTE — DISCHARGE INSTRUCTIONS
Sinus precautions for 2 weeks including:                        - No sucking through a straw                        - Sneeze with mouth open                        - Avoid blowing nose

## 2017-12-01 NOTE — PROGRESS NOTES
Goal: Plan of Care/Patient Progress Review  Outcome: Improving  Neuro: Alert and oriented to self, disoriented x3. Bed alarm on.  Cardiac: SR-ST with HR 90-low 100s. VSS.             Respiratory: Sating 99% on 2L NC while sleeping, otherwise desat into 80's  GI/: frequent voiding. Passing gas.  Diet/appetite: Drank water this shift  Activity:  SBA up to chair and in halls.  Pain: Pt denies pain  Skin: facial bruising and swelling, R supraorbital fx. Generalized bruising.  LDA's: piv, ivf, iv abx    Obs Goals  Pain control--MET  PT/OT eval --Pending  SW/discharge planning--Pending    Plan: Continue with POC. Notify primary team with changes. Plan to d.c tomorrow

## 2017-12-01 NOTE — PLAN OF CARE
Pt is deemed Observation per Utilization Review. 'What is Observation?' information document given to pt/family. All questions answered.

## 2017-12-01 NOTE — PROGRESS NOTES
Care Coordinator- Assessment Note     Admission Date/Time:  11/29/2017  Attending MD:  Matthew Bowden*     Data  Chart reviewed, discussed with interdisciplinary team.   Patient was admitted for:   1. Fall    2. Closed fracture of seventh thoracic vertebra, unspecified fracture morphology, sequela    3. Closed fracture of orbit, initial encounter (H)    4. Facial laceration, initial encounter    5. Other specified hearing loss of both ears    6. Closed fracture of right orbit, initial encounter (H)    7. Closed fracture of seventh thoracic vertebra, unspecified fracture morphology, initial encounter (H)    8. Bilateral deafness    9. Fall, initial encounter        RNCC Assessment  Concerns with insurance coverage for discharge needs: None.  Current Living Situation: Patient lives in an assisted living facility.  Support System: Supportive  Services Involved: Assisted Living  Transportation: Family or Friend will provide  Barriers to Discharge: medical plan of care  Coordination of Care and Referrals: Provided patient/family with options for Assisted Living.    Assessment: Met with patient and her daughter, Linh (patient's daughter) stated that she isn't very familiar with what services are offered at the assisted living.  I placed a call to  [facility]  [phone] and spoke with a nurse to discuss services. The patient lives in a memory care unit at [facility] on [address] [phone]. Spoke with the nurse.  She is unable to determine what additional services would be available.  I told her I would call back tomorrow.     Plan  Anticipated Discharge Date:  TBD  Anticipated Discharge Plan:  pending    CTS Handoff completed: sharee Arboleda RN, MSN, PHN  RNCCPH: [phone]  Pager: [phone]  Covering for : Hafsa Mary,  6B RNCC

## 2017-12-01 NOTE — PLAN OF CARE
Goal: Plan of Care/Patient Progress Review  Outcome: Improving  Neuro: Alert and oriented to self, disoriented x3. Bed alarm on.  Cardiac: ST.     Respiratory: Sating 99% on 2L NC while sleeping, RA while awake.   GI/:  voiding. Passing gas.  Diet/appetite: regular diet  Activity:  SBA up to chair and in halls.  Pain: Pt denies pain  Skin: facial bruising and swelling, R supraorbital fx. Generalized bruising.  LDA's: piv, ivf, iv abx      Obs Goals  Pain control--MET  PT/OT eval -- Pending  SW/discharge planning--MET      Plan: Continue with POC. Notify primary team with changes. Plan to d.c today

## 2017-12-01 NOTE — PLAN OF CARE
Problem: Patient Care Overview  Goal: Plan of Care/Patient Progress Review  Outcome: Improving  Neuro: A&Ox1  Cardiac: SR. VSS. AF   Respiratory: Sating 95% on RA.  GI/: Adequate urine output.   Diet/appetite: Tolerating regular diet. Eating well.  Activity:  Assist of 1, up to chair and in halls.  Pain: At acceptable level on current regimen.   Skin: Intact, no new deficits noted.  LDA's: PIV- NS @ 100 ml/hr     Plan: Continue with POC. Notify primary team with changes.

## 2017-12-01 NOTE — PLAN OF CARE
Goal: Plan of Care/Patient Progress Review  Outcome: Improving  Neuro: Alert and oriented to self, disoriented x3. Bed alarm on.  Cardiac: Tele DC'd   Respiratory: RA while awake.   GI/:  voiding in commode  Diet/appetite: regular diet. Nausea noted- pt still able to eat meals   Activity:  SBA up to chair and BR   Pain: eye pain- scheduled tylenol given with relief noted   Skin: facial bruising and swelling, R supraorbital fx. Generalized bruising.  LDA's: piv, ivf, iv abx      Obs Goals  Pain control--MET  PT/OT eval -- MET- pt able to return to facility with escalated cares.   SW/discharge planning--MET      Plan: Continue with POC. Notify primary team with changes. Plan to d.c today

## 2017-12-01 NOTE — PLAN OF CARE
Problem: Patient Care Overview  Goal: Plan of Care/Patient Progress Review  Outcome: Improving  Neuro: Oriented only to person. Redirectable, but short-term memory loss.   Cardiac: Pt. Monitor showing abnormal rhythms. RN ordered 12-lead EKG and spoke to MD. See Notify MD note.   Respiratory: Sating 98% on 2LNC overnight while sleeping. RA while awake.   GI/: Voiding frequently. No pain or burn on urination. UA was negative.   Diet/appetite: Tolerating regular diet. Eating well.  Activity:  Assist of 1, up to commode.   Pain: At acceptable level on current regimen. Patient refused tylenol   Skin: Intact, no new deficits noted.Brusing, redness, right eye laceration from fall.   LDA's: RFA PIV TKO NS.     Plan: Continue with POC. Notify primary team with changes.

## 2018-01-13 ENCOUNTER — HOSPITAL ENCOUNTER (INPATIENT)
Facility: CLINIC | Age: 83
LOS: 8 days | Discharge: NURSING, REHAB OR LONG-TERM CARE | DRG: 280 | End: 2018-01-21
Attending: EMERGENCY MEDICINE | Admitting: INTERNAL MEDICINE
Payer: MEDICARE

## 2018-01-13 ENCOUNTER — APPOINTMENT (OUTPATIENT)
Dept: CT IMAGING | Facility: CLINIC | Age: 83
DRG: 280 | End: 2018-01-13
Attending: EMERGENCY MEDICINE
Payer: MEDICARE

## 2018-01-13 DIAGNOSIS — I24.9 ACS (ACUTE CORONARY SYNDROME) (H): Primary | ICD-10-CM

## 2018-01-13 DIAGNOSIS — J90 PLEURAL EFFUSION: ICD-10-CM

## 2018-01-13 DIAGNOSIS — I10 ESSENTIAL HYPERTENSION: ICD-10-CM

## 2018-01-13 DIAGNOSIS — R05.9 COUGH: ICD-10-CM

## 2018-01-13 DIAGNOSIS — R07.9 CHEST PAIN, UNSPECIFIED TYPE: ICD-10-CM

## 2018-01-13 LAB
ANION GAP SERPL CALCULATED.3IONS-SCNC: 9 MMOL/L (ref 3–14)
BASOPHILS # BLD AUTO: 0 10E9/L (ref 0–0.2)
BASOPHILS NFR BLD AUTO: 0.7 %
BUN SERPL-MCNC: 20 MG/DL (ref 7–30)
CALCIUM SERPL-MCNC: 9.1 MG/DL (ref 8.5–10.1)
CHLORIDE SERPL-SCNC: 100 MMOL/L (ref 94–109)
CO2 SERPL-SCNC: 27 MMOL/L (ref 20–32)
CREAT SERPL-MCNC: 0.98 MG/DL (ref 0.52–1.04)
D DIMER PPP FEU-MCNC: 1.9 UG/ML FEU (ref 0–0.5)
DIFFERENTIAL METHOD BLD: NORMAL
EOSINOPHIL # BLD AUTO: 0.2 10E9/L (ref 0–0.7)
EOSINOPHIL NFR BLD AUTO: 3.2 %
ERYTHROCYTE [DISTWIDTH] IN BLOOD BY AUTOMATED COUNT: 13.7 % (ref 10–15)
GFR SERPL CREATININE-BSD FRML MDRD: 53 ML/MIN/1.7M2
GLUCOSE SERPL-MCNC: 127 MG/DL (ref 70–99)
HCT VFR BLD AUTO: 36.9 % (ref 35–47)
HGB BLD-MCNC: 11.9 G/DL (ref 11.7–15.7)
IMM GRANULOCYTES # BLD: 0 10E9/L (ref 0–0.4)
IMM GRANULOCYTES NFR BLD: 0.4 %
INTERPRETATION ECG - MUSE: NORMAL
LMWH PPP CHRO-ACNC: 0.49 IU/ML
LYMPHOCYTES # BLD AUTO: 1.7 10E9/L (ref 0.8–5.3)
LYMPHOCYTES NFR BLD AUTO: 30.6 %
MCH RBC QN AUTO: 30.4 PG (ref 26.5–33)
MCHC RBC AUTO-ENTMCNC: 32.2 G/DL (ref 31.5–36.5)
MCV RBC AUTO: 94 FL (ref 78–100)
MONOCYTES # BLD AUTO: 0.5 10E9/L (ref 0–1.3)
MONOCYTES NFR BLD AUTO: 8.1 %
NEUTROPHILS # BLD AUTO: 3.2 10E9/L (ref 1.6–8.3)
NEUTROPHILS NFR BLD AUTO: 57 %
NRBC # BLD AUTO: 0 10*3/UL
NRBC BLD AUTO-RTO: 0 /100
PLATELET # BLD AUTO: 254 10E9/L (ref 150–450)
POTASSIUM SERPL-SCNC: 3.8 MMOL/L (ref 3.4–5.3)
RBC # BLD AUTO: 3.91 10E12/L (ref 3.8–5.2)
SODIUM SERPL-SCNC: 136 MMOL/L (ref 133–144)
TROPONIN I SERPL-MCNC: 0.06 UG/L (ref 0–0.04)
TROPONIN I SERPL-MCNC: 0.06 UG/L (ref 0–0.04)
TROPONIN I SERPL-MCNC: 0.07 UG/L (ref 0–0.04)
WBC # BLD AUTO: 5.7 10E9/L (ref 4–11)

## 2018-01-13 PROCEDURE — 25000128 H RX IP 250 OP 636: Performed by: EMERGENCY MEDICINE

## 2018-01-13 PROCEDURE — 99223 1ST HOSP IP/OBS HIGH 75: CPT | Mod: AI | Performed by: INTERNAL MEDICINE

## 2018-01-13 PROCEDURE — 71260 CT THORAX DX C+: CPT

## 2018-01-13 PROCEDURE — 25000132 ZZH RX MED GY IP 250 OP 250 PS 637: Mod: GY | Performed by: INTERNAL MEDICINE

## 2018-01-13 PROCEDURE — 25000128 H RX IP 250 OP 636: Performed by: INTERNAL MEDICINE

## 2018-01-13 PROCEDURE — 40000895 ZZH STATISTIC SLP IP EVAL DEFER

## 2018-01-13 PROCEDURE — 80048 BASIC METABOLIC PNL TOTAL CA: CPT | Performed by: EMERGENCY MEDICINE

## 2018-01-13 PROCEDURE — 84484 ASSAY OF TROPONIN QUANT: CPT | Performed by: INTERNAL MEDICINE

## 2018-01-13 PROCEDURE — 85025 COMPLETE CBC W/AUTO DIFF WBC: CPT | Performed by: EMERGENCY MEDICINE

## 2018-01-13 PROCEDURE — 93005 ELECTROCARDIOGRAM TRACING: CPT

## 2018-01-13 PROCEDURE — 40000141 ZZH STATISTIC PERIPHERAL IV START W/O US GUIDANCE

## 2018-01-13 PROCEDURE — A9270 NON-COVERED ITEM OR SERVICE: HCPCS | Mod: GY | Performed by: INTERNAL MEDICINE

## 2018-01-13 PROCEDURE — 96365 THER/PROPH/DIAG IV INF INIT: CPT | Mod: 59

## 2018-01-13 PROCEDURE — 84484 ASSAY OF TROPONIN QUANT: CPT | Performed by: EMERGENCY MEDICINE

## 2018-01-13 PROCEDURE — 25000125 ZZHC RX 250: Performed by: EMERGENCY MEDICINE

## 2018-01-13 PROCEDURE — 85379 FIBRIN DEGRADATION QUANT: CPT | Performed by: EMERGENCY MEDICINE

## 2018-01-13 PROCEDURE — 21000001 ZZH R&B HEART CARE

## 2018-01-13 PROCEDURE — 85520 HEPARIN ASSAY: CPT | Performed by: INTERNAL MEDICINE

## 2018-01-13 PROCEDURE — 99285 EMERGENCY DEPT VISIT HI MDM: CPT | Mod: 25

## 2018-01-13 PROCEDURE — 36415 COLL VENOUS BLD VENIPUNCTURE: CPT | Performed by: INTERNAL MEDICINE

## 2018-01-13 RX ORDER — NITROGLYCERIN 0.4 MG/1
0.4 TABLET SUBLINGUAL EVERY 5 MIN PRN
Status: DISCONTINUED | OUTPATIENT
Start: 2018-01-13 | End: 2018-01-21 | Stop reason: HOSPADM

## 2018-01-13 RX ORDER — ALUMINA, MAGNESIA, AND SIMETHICONE 2400; 2400; 240 MG/30ML; MG/30ML; MG/30ML
30 SUSPENSION ORAL EVERY 4 HOURS PRN
Status: DISCONTINUED | OUTPATIENT
Start: 2018-01-13 | End: 2018-01-21 | Stop reason: HOSPADM

## 2018-01-13 RX ORDER — ACETAMINOPHEN 500 MG
1000 TABLET ORAL 3 TIMES DAILY
Status: DISCONTINUED | OUTPATIENT
Start: 2018-01-13 | End: 2018-01-21 | Stop reason: HOSPADM

## 2018-01-13 RX ORDER — ASPIRIN 81 MG/1
81 TABLET, CHEWABLE ORAL DAILY
Status: DISCONTINUED | OUTPATIENT
Start: 2018-01-14 | End: 2018-01-21 | Stop reason: HOSPADM

## 2018-01-13 RX ORDER — NALOXONE HYDROCHLORIDE 0.4 MG/ML
.1-.4 INJECTION, SOLUTION INTRAMUSCULAR; INTRAVENOUS; SUBCUTANEOUS
Status: DISCONTINUED | OUTPATIENT
Start: 2018-01-13 | End: 2018-01-21 | Stop reason: HOSPADM

## 2018-01-13 RX ORDER — ASPIRIN 325 MG
325 TABLET ORAL DAILY
Status: DISCONTINUED | OUTPATIENT
Start: 2018-01-14 | End: 2018-01-13

## 2018-01-13 RX ORDER — PROCHLORPERAZINE MALEATE 5 MG
5 TABLET ORAL EVERY 6 HOURS PRN
Status: DISCONTINUED | OUTPATIENT
Start: 2018-01-13 | End: 2018-01-21 | Stop reason: HOSPADM

## 2018-01-13 RX ORDER — IOPAMIDOL 755 MG/ML
59 INJECTION, SOLUTION INTRAVASCULAR ONCE
Status: COMPLETED | OUTPATIENT
Start: 2018-01-13 | End: 2018-01-13

## 2018-01-13 RX ORDER — PROCHLORPERAZINE 25 MG
12.5 SUPPOSITORY, RECTAL RECTAL EVERY 12 HOURS PRN
Status: DISCONTINUED | OUTPATIENT
Start: 2018-01-13 | End: 2018-01-21 | Stop reason: HOSPADM

## 2018-01-13 RX ORDER — FUROSEMIDE 10 MG/ML
20 INJECTION INTRAMUSCULAR; INTRAVENOUS DAILY
Status: DISCONTINUED | OUTPATIENT
Start: 2018-01-13 | End: 2018-01-14

## 2018-01-13 RX ORDER — ACETAMINOPHEN 325 MG/1
650 TABLET ORAL EVERY 4 HOURS PRN
Status: DISCONTINUED | OUTPATIENT
Start: 2018-01-13 | End: 2018-01-21 | Stop reason: HOSPADM

## 2018-01-13 RX ORDER — ASPIRIN 81 MG/1
324 TABLET, CHEWABLE ORAL ONCE
Status: DISCONTINUED | OUTPATIENT
Start: 2018-01-13 | End: 2018-01-21 | Stop reason: HOSPADM

## 2018-01-13 RX ORDER — MINERAL OIL 100 G/100G
1 OIL RECTAL
COMMUNITY

## 2018-01-13 RX ORDER — POLYETHYLENE GLYCOL 3350 17 G/17G
17 POWDER, FOR SOLUTION ORAL DAILY PRN
Status: DISCONTINUED | OUTPATIENT
Start: 2018-01-13 | End: 2018-01-21 | Stop reason: HOSPADM

## 2018-01-13 RX ORDER — LEVOFLOXACIN 5 MG/ML
250 INJECTION, SOLUTION INTRAVENOUS EVERY 24 HOURS
Status: DISCONTINUED | OUTPATIENT
Start: 2018-01-14 | End: 2018-01-14

## 2018-01-13 RX ORDER — HYDROMORPHONE HYDROCHLORIDE 1 MG/ML
.3-.5 INJECTION, SOLUTION INTRAMUSCULAR; INTRAVENOUS; SUBCUTANEOUS
Status: DISCONTINUED | OUTPATIENT
Start: 2018-01-13 | End: 2018-01-21 | Stop reason: HOSPADM

## 2018-01-13 RX ORDER — ASPIRIN 81 MG/1
81 TABLET, CHEWABLE ORAL DAILY
Status: DISCONTINUED | OUTPATIENT
Start: 2018-01-14 | End: 2018-01-13

## 2018-01-13 RX ORDER — OXYCODONE HYDROCHLORIDE 5 MG/1
5-10 TABLET ORAL
Status: DISCONTINUED | OUTPATIENT
Start: 2018-01-13 | End: 2018-01-21 | Stop reason: HOSPADM

## 2018-01-13 RX ORDER — LEVOTHYROXINE SODIUM 88 UG/1
88 TABLET ORAL DAILY
Status: DISCONTINUED | OUTPATIENT
Start: 2018-01-13 | End: 2018-01-21 | Stop reason: HOSPADM

## 2018-01-13 RX ORDER — SENNOSIDES 8.6 MG
1 TABLET ORAL DAILY PRN
Status: DISCONTINUED | OUTPATIENT
Start: 2018-01-13 | End: 2018-01-21 | Stop reason: HOSPADM

## 2018-01-13 RX ORDER — ONDANSETRON 4 MG/1
4 TABLET, ORALLY DISINTEGRATING ORAL EVERY 6 HOURS PRN
Status: DISCONTINUED | OUTPATIENT
Start: 2018-01-13 | End: 2018-01-21 | Stop reason: HOSPADM

## 2018-01-13 RX ORDER — METOPROLOL TARTRATE 25 MG/1
25 TABLET, FILM COATED ORAL 2 TIMES DAILY
Status: DISCONTINUED | OUTPATIENT
Start: 2018-01-13 | End: 2018-01-14

## 2018-01-13 RX ORDER — LEVOFLOXACIN 5 MG/ML
500 INJECTION, SOLUTION INTRAVENOUS EVERY 24 HOURS
Status: DISCONTINUED | OUTPATIENT
Start: 2018-01-13 | End: 2018-01-13

## 2018-01-13 RX ORDER — LEVOFLOXACIN 5 MG/ML
500 INJECTION, SOLUTION INTRAVENOUS ONCE
Status: COMPLETED | OUTPATIENT
Start: 2018-01-13 | End: 2018-01-13

## 2018-01-13 RX ORDER — BISACODYL 10 MG
10 SUPPOSITORY, RECTAL RECTAL DAILY PRN
Status: DISCONTINUED | OUTPATIENT
Start: 2018-01-13 | End: 2018-01-21 | Stop reason: HOSPADM

## 2018-01-13 RX ORDER — PRAVASTATIN SODIUM 40 MG
40 TABLET ORAL EVERY EVENING
Status: DISCONTINUED | OUTPATIENT
Start: 2018-01-13 | End: 2018-01-21 | Stop reason: HOSPADM

## 2018-01-13 RX ORDER — LIDOCAINE 40 MG/G
CREAM TOPICAL
Status: DISCONTINUED | OUTPATIENT
Start: 2018-01-13 | End: 2018-01-21 | Stop reason: HOSPADM

## 2018-01-13 RX ORDER — ONDANSETRON 2 MG/ML
4 INJECTION INTRAMUSCULAR; INTRAVENOUS EVERY 6 HOURS PRN
Status: DISCONTINUED | OUTPATIENT
Start: 2018-01-13 | End: 2018-01-21 | Stop reason: HOSPADM

## 2018-01-13 RX ORDER — ACETAMINOPHEN 650 MG/1
650 SUPPOSITORY RECTAL EVERY 4 HOURS PRN
Status: DISCONTINUED | OUTPATIENT
Start: 2018-01-13 | End: 2018-01-21 | Stop reason: HOSPADM

## 2018-01-13 RX ADMIN — HEPARIN SODIUM 700 UNITS/HR: 10000 INJECTION, SOLUTION INTRAVENOUS at 10:56

## 2018-01-13 RX ADMIN — ACETAMINOPHEN 1000 MG: 500 TABLET, FILM COATED ORAL at 16:53

## 2018-01-13 RX ADMIN — SODIUM CHLORIDE 85 ML: 9 INJECTION, SOLUTION INTRAVENOUS at 08:19

## 2018-01-13 RX ADMIN — PRAVASTATIN SODIUM 40 MG: 40 TABLET ORAL at 20:40

## 2018-01-13 RX ADMIN — FUROSEMIDE 20 MG: 10 INJECTION, SOLUTION INTRAVENOUS at 13:49

## 2018-01-13 RX ADMIN — IOPAMIDOL 59 ML: 755 INJECTION, SOLUTION INTRAVENOUS at 08:19

## 2018-01-13 RX ADMIN — LEVOTHYROXINE SODIUM 88 MCG: 88 TABLET ORAL at 13:50

## 2018-01-13 RX ADMIN — METOPROLOL TARTRATE 25 MG: 25 TABLET ORAL at 20:40

## 2018-01-13 RX ADMIN — Medication 3000 UNITS: at 10:56

## 2018-01-13 RX ADMIN — LEVOFLOXACIN 500 MG: 5 INJECTION, SOLUTION INTRAVENOUS at 14:45

## 2018-01-13 RX ADMIN — METOPROLOL TARTRATE 25 MG: 25 TABLET ORAL at 13:50

## 2018-01-13 ASSESSMENT — ENCOUNTER SYMPTOMS
LIGHT-HEADEDNESS: 0
SHORTNESS OF BREATH: 1
ABDOMINAL PAIN: 0
NAUSEA: 0
COUGH: 0

## 2018-01-13 NOTE — IP AVS SNAPSHOT
[address], [address]    [facility] [address]    Phone:  [phone]                                       After Visit Summary   1/13/2018    Olga Zelaya    MRN: [id number]           After Visit Summary Signature Page     I have received my discharge instructions, and my questions have been answered. I have discussed any challenges I see with this plan with the nurse or doctor.    ..........................................................................................................................................  Patient/Patient Representative Signature      ..........................................................................................................................................  Patient Representative Print Name and Relationship to Patient    ..................................................               ................................................  Date                                            Time    ..........................................................................................................................................  Reviewed by Signature/Title    ...................................................              ..............................................  Date                                                            Time

## 2018-01-13 NOTE — IP AVS SNAPSHOT
` [place] [address] ONCOLOGY: [phone]                 INTERAGENCY TRANSFER FORM - NOTES (H&P, Discharge Summary, Consults, Procedures, Therapies)   2018                    Hospital Admission Date: 2018  BE MACEDO   : 1923  Sex: Female        Patient PCP Information     Provider PCP Type    ANTIONE PEREZ General         History & Physicals      H&P signed by Emily Crowell MD at 2018  6:09 PM      Author:  Emily Crowell MD Service:  Hospitalist Author Type:  Physician    Filed:  2018  6:09 PM Date of Service:  2018 12:56 PM Creation Time:  2018  1:56 PM    Status:  Signed :  Emily Crowell MD (Physician)         PRIMARY CARE PHYSICIAN:  Dr. Antione Perez with [facility].      CHIEF COMPLAINT:  Chest pain and shortness of breath.      HISTORY OF PRESENT ILLNESS:  Ms. Be Macedo is a 89-year-old female with history of dementia, hypertension, hyperlipidemia, hypothyroidism, diabetes mellitus type 2, diet-controlled.  She is at [facility].  Complained of chest pain this morning at 5:30-6:00 a.m.  She woke up and she was clutching at her chest and so the staff called EMS and the patient was also having shortness of breath and difficulty breathing during transport so she was given a DuoNeb during transportation.  In the emergency room, she was evaluated by Dr. Blankenship, the ER physician.  Her D-dimer was found to be elevated at 1.9.  A 12-lead EKG was done and it showed sinus rhythm, left anterior fascicular block and septal infarct, otherwise no acute signs of ischemia.  Troponin was mildly elevated at 0.069.  Because the D-dimer was elevated at 1.9, a CT scan of the chest for PE with contrast was done and it showed no evidence of PE, but there were new with small bilateral pleural effusions and bibasilar atelectasis, patchy nodular airspace opacities noted, dominantly seen at the right lower lobe, could be pneumonia but  there is possibility of underlying malignancy so followup CT chest in 3 months was recommended for indeterminate finding.  So for the positive troponin, she was started on heparin drip and request for hospitalization was made.  Because of her history of dementia, most of the history is obtained from daughter, Linh who was at her bedside.  She otherwise denied any fevers or chills.  She has occasional chronic cough, which has been stable.  No dysuria, urgency or frequency.  No lightheadedness or any other complaints today.      PAST MEDICAL HISTORY:  Significant for:     1.  History of dementia.  She is at a chronic care facility.   2.  Diabetes mellitus type 2, diet-controlled.   3.  Hypertension.   4.  Hypothyroidism.   5.  Hyperlipidemia.      ALLERGIES:  Allergic to penicillin and shellfish.      SOCIAL HISTORY:  Not a smoker, no alcohol use.  She is .  She is at the care facility at [facility].      REVIEW OF SYSTEMS:  Ten-point review of systems was done and it is negative except as in HPI.      PHYSICAL EXAMINATION:   VITAL SIGNS:  Her temperature is 97.8 degrees Fahrenheit, pulse rate was initially rapid at 102, currently at 96, blood pressure 117/72.  Respiratory rate 10-18.  She is satting 94% on 2 liters by nasal cannula.   GENERAL:  She is alert, oriented, pleasantly confused female resting comfortably in bed, not in any acute distress.   HEENT:  Atraumatic, normocephalic.   NECK:  Supple.   HEART:  S1, S2 heard, no murmurs, rubs or gallops.   LUNGS:  Clear to auscultation, no rales, rhonchi or wheezing.   ABDOMEN:  Soft, nontender, nondistended, no hepatosplenomegaly.   EXTREMITIES:  No clubbing, cyanosis or edema.   NEUROLOGIC:  No focal weakness.   PSYCHIATRIC:  Mood and affect are normal.      LABORATORY AND RADIOLOGICAL INVESTIGATIONS:  As dictated above, CT scan of the chest showed multiple nodular densities, the maximum at 1.2 cm which could be pneumonia or underlying malignancy with  consolidation, newly seen, but no pulmonary embolism.  Troponin is elevated at 0.069.  CBC showed WBC count of 5.7, hemoglobin 11.9, platelet count of 254.  BMP is reviewed and is normal with sodium 136, potassium 3.8, chloride 100, bicarbonate 27, anion gap 9, glucose 127, BUN 20, creatinine 0.98.  Troponin 0.06.  A 12-lead EKG reviewed and showed sinus rhythm with left anterior fascicular block and a septal infarct, otherwise no acute signs of ischemia.      ASSESSMENT AND PLAN:  A 89-year-old female with a history of dementia, hypertension, hyperlipidemia, diet-controlled diabetes mellitus, presenting with:   1.  Chest pain and shortness of breath, multifactorial.  With elevated troponin, non-ST elevation myocardial infarction is a possibility as well and with the bilateral small pleural effusions, congestive heart failure exacerbation is contributing as well and she also has nodular densities with consolidation consistent with pneumonitis so she will be hospitalized and will continue the heparin drip which was started in the emergency room and serial troponins will be obtained.   Goals of care discussion was done with the daughter and she does not want her mom to have any invasive procedures like angiogram.  They want her to be treated medically so will continue the heparin drip, serial troponins.  An echocardiogram will be checked.   2.  Bilateral pleural effusions, could be congestive heart failure exacerbation so will gently diurese her with Lasix 20 mg IV daily.  Cardiology consultation will be obtained for medical management of her non-ST elevation myocardial infarction.   3.  Pneumonitis with community-acquired pneumonia.  Will treat her with IV Levaquin 500 mg daily and she will need to have repeat imaging of CT scan in 3 months to see there is no underlying malignancy contributing to her presentation and with a non-ST elevation myocardial infarction picture, will put her on low dose metoprolol 25 mg p.o.  b.i.d. and aspirin and statin will be started on her.  Will check a fasting lipid panel tomorrow.   4.  Hypothyroidism.  Will continue levothyroxine.   5.  Code status was discussed with the daughter and she wants her to be DNR/DNI, which will be offered to her.  I am anticipating more than 2 nights' stay so she will be admitted as inpatient.     6.  Deep vein thrombosis prophylaxis.  She is on heparin drip.         EMILY SCHREIBER MD             D: 2018 12:56   T: 2018 13:56   MT: DORIAN      Name:     BE MACEDO   MRN:      [id number]        Account:      [id number]   :      1923           Admitted:     [id number]      Document: [id number]       cc: Juan Carlos Perez MD   [ST1.1]   Revision History        User Key Date/Time User Provider Type Action    > ST1.1 2018  6:09 PM Emily Schreiber MD Physician Sign                  Discharge Summaries     No notes of this type exist for this encounter.         Consult Notes      Consults by Jacob Howard MD at 2018  4:34 PM     Author:  Jacob Howard MD Service:  Cardiology Author Type:  Physician    Filed:  2018  4:40 PM Date of Service:  2018  4:34 PM Creation Time:  2018  4:33 PM    Status:  Signed :  Jacob Howard MD (Physician)     Consult Orders:    1. Cardiology IP Consult: Patient to be seen: Routine - within 24 hours; pt with nonstemi. medical management only as per daughter; Consultant may enter orders: Yes [[id number]] ordered by Emily Schreiber MD at 18 1021                [facility]    Cardiology Consultation     Date of Admission:  2018  Date of Consult (When I saw the patient): 18[RW1.1]    Assessment & Plan[RW1.2]   Be Macedo is a 89 year old female who was admitted on 2018. I was asked to see the patient for non-STEMI.    Active Problems:    ACS (acute coronary syndrome) (H)    Assessment: Based on the patient's account of her episode as well as  her daughters, coupled with the laboratory data, and appears of the patient suffered a small NSTEMI. Based on the patient's age and comorbidities it is likely best serve that we manage this in an ischemic driven fashion.    Plan:     - Continue heparin for 48 hours.    - Patient is asymptomatic now but could add long-acting nitrate for symptom relief.    - Continue aspirin (81 mg), beta blocker, and statin    - Cardiology will sign off for now. This was discussed with Dr. Barnes.[RW1.1]        Jacob Howard[RW1.2], MD[RW1.1]    Code Status    DNR/DNI    Reason for Consult[RW1.2]   Reason for consult: I was asked by Dr. Crowell to evaluate this patient for NSTEMI[RW1.1]    Primary Care Physician   ANTIONE LIMON    Chief Complaint[RW1.2]   Chest pain and dyspnea.    History is obtained from the patient and her daughter.[RW1.1]    History of Present Illness[RW1.2]   Olga Zelaya is a 89 year old female who presents with a single episode of chest pain and dyspnea that awoke her this morning at approximately 6 AM. She has had no further symptoms since that time. She does not specifically recall the event, and details were gathered from her daughter. When I interviewed her she specifically denies chest pain, dyspnea, jaw or arm pain, claudication, lower extremity edema, orthopnea, paroxysmal nocturnal dyspnea, or any other cardiovascular or systemic signs or symptoms.[RW1.1]      Past Medical History[RW1.2]   I have reviewed this patient's medical history and updated it with pertinent information if needed.[RW1.1]   Past Medical History:   Diagnosis Date     Dementia      Diabetes mellitus (H)      High cholesterol      Hypothyroid        Past Surgical History[RW1.2]   I have reviewed this patient's surgical history and updated it with pertinent information if needed.[RW1.1]  Past Surgical History:   Procedure Laterality Date     APPENDECTOMY       BIOPSY ARTERY TEMPORAL Left 4/28/2017    Procedure: BIOPSY ARTERY  TEMPORAL;  LEFT TEMPORAL ARTERY BIOPSY.;  Surgeon: Jem Jaramillo MD;  Location: [facility] OR     CHOLECYSTECTOMY         Prior to Admission Medications   Prior to Admission Medications   Prescriptions Last Dose Informant Patient Reported? Taking?   ACETAMINOPHEN PO 1/12/2018 at pm Nursing Home Yes Yes   Sig: Take 1,000 mg by mouth 3 times daily   ASCORBIC ACID PO 1/12/2018 at am Nursing Home Yes Yes   Sig: Take 1,000 mg by mouth daily   ASPIRIN PO 1/12/2018 at am Nursing Home Yes Yes   Sig: Take 81 mg by mouth daily   LEVOTHYROXINE SODIUM PO 1/12/2018 at am Nursing Home Yes Yes   Sig: Take 88 mcg by mouth daily   NITROGLYCERIN SL  at prn Nursing Home Yes Yes   Sig: Place 0.4 mg under the tongue every 5 minutes as needed for chest pain   calcium carbonate (TUMS) 500 MG chewable tablet  at prn Nursing Home Yes Yes   Sig: Take 1 chew tab by mouth 4 times daily as needed for heartburn   cholecalciferol (VITAMIN D) 1000 UNIT tablet 1/12/2018 at am Nursing Home Yes Yes   Sig: Take 1 tablet by mouth daily.   magnesium hydroxide (MILK OF MAGNESIA) 400 MG/5ML suspension  at prn Nursing Home Yes Yes   Sig: Take 30 mLs by mouth daily as needed for constipation or heartburn   mineral oil enema  at prn Nursing Home Yes Yes   Sig: Place 1 enema rectally once as needed for constipation   sennosides (SENOKOT) 8.6 MG tablet  at prn Nursing Home Yes Yes   Sig: Take 1 tablet by mouth daily as needed for constipation      Facility-Administered Medications: None     Allergies   Allergies   Allergen Reactions     Biaxin [Clarithromycin]      Codeine      Nabumetone      Penicillins      Seafood      Sulfa Drugs        Social History[RW1.2]   I have reviewed this patient's social history and updated it with pertinent information if needed. Olga ARREOLA Garcia[RW1.1]  reports that she has never smoked. She does not have any smokeless tobacco history on file. She reports that she does not drink alcohol or use illicit drugs.    Family  History[RW1.2]   I have reviewed this patient's family history and updated it with pertinent information if needed.[RW1.1]   Family History   Problem Relation Age of Onset     DIABETES Mother        Review of Systems[RW1.2]   The 10 point Review of Systems is negative other than noted in the HPI.[RW1.1]    Physical Exam   Temp: 97.7  F (36.5  C) Temp src: Oral BP: 106/64 Pulse: 82 Heart Rate: 92 Resp: 18 SpO2: 96 % O2 Device: Nasal cannula Oxygen Delivery: 2 LPM[RW1.2]  Vital Signs with Ranges[RW1.1]  Temp:  [97.7  F (36.5  C)-97.8  F (36.6  C)] 97.7  F (36.5  C)  Pulse:  [] 82  Heart Rate:  [] 92  Resp:  [10-25] 18  BP: (106-149)/(64-99) 106/64  SpO2:  [94 %-97 %] 96 %  0 lbs 0 oz[RW1.2]    Constitutional:Alert but not oriented   Hematologic / Lymphatic: Examined and normal.  Respiratory: Faint crackles and wheezes heard diffusely  Cardiovascular: Slight RV lift with normal JVP. S1 and S2 are normal with soft systolic murmur.  GI: No scars, normal bowel sounds, soft, non-distended, non-tender, no masses palpated, no hepatosplenomegally  Skin: No redness, warmth, or swelling  Musculoskeletal: 1+ lower extremity pitting edema present[RW1.1]      Data[RW1.2]   Most Recent 2 LFT's:[RW1.1]  Recent Labs   Lab Test  11/30/17   1234  02/19/12   2250   AST  14  35   ALT  9  13   ALKPHOS  55  78   BILITOTAL  0.6  1.1[RW1.2]     Most Recent 3 Creatinines:[RW1.1]  Recent Labs   Lab Test  01/13/18   0635  12/01/17   0536  11/30/17   1234   CR  0.98  0.93  0.92[RW1.2]     Most Recent 3 Hemoglobins:[RW1.1]  Recent Labs   Lab Test  01/13/18   0635  12/01/17   0536  11/30/17   1234   HGB  11.9  9.9*  11.0*[RW1.2]     Most Recent 3 Troponin's:[RW1.1]  Recent Labs   Lab Test  01/13/18   1430  01/13/18   0635  11/29/17   1948   TROPI  0.060*  0.069*  0.019[RW1.2]     Most Recent Urinalysis:[RW1.1]  Recent Labs   Lab Test  11/30/17   0947   COLOR  Light Yellow   APPEARANCE  Clear   URINEGLC  Negative   URINEBILI  Negative    URINEKETONE  5*   SG  1.033   UBLD  Negative   URINEPH  6.0   PROTEIN  Negative   NITRITE  Negative   LEUKEST  Trace*   RBCU  2   WBCU  7*[RW1.2]     Most Recent CPK:[RW1.1]No lab results found.[RW1.2]                   Revision History        User Key Date/Time User Provider Type Action    > RW1.2 1/13/2018  4:40 PM Jacob Howard MD Physician Sign     RW1.1 1/13/2018  4:33 PM Jacob Howard MD Physician                      Progress Notes - Physician (Notes from 01/18/18 through 01/21/18)      Progress Notes by Medhat Miller MD at 1/20/2018  9:20 AM     Author:  Medhat Miller MD Service:  Hospitalist Author Type:  Physician    Filed:  1/20/2018  9:35 AM Date of Service:  1/20/2018  9:20 AM Creation Time:  1/20/2018  9:20 AM    Status:  Signed :  Medhat Miller MD (Physician)         [facility]    Hospitalist Progress Note    Date of Service (when I saw the patient): 01/20/2018    Assessment & Plan   Olga Zelaya is a 89 year old female who was admitted on 1/13/2018.  Past history of dementia, DM II, HTN, hyperlipidemia, hypothyroidism who presents with chest pain and dyspnea, found to have NSTEMI and possible PNA.     NSTEMI:    Admission EKG without ischemic changes, serial troponin mildly elevated but flat.  HDL 61, LDL 95.  Managed medically per family wishes, completed 48-hours heparin gtt on 1/15.  TTE 1/14 with EF 25-30% with anteroapical and inferolateral hypokinesis (new from TTE 9/2015 in [facility]).  Cardiology signed off.  - continue aspirin, pravastatin, lisinopril, metoprolol, Imdur (dose split bid due to intermittent hypotension), lasix (dose reduced 1/18 due to bump in Cr)  - Cr stable 1/19[JR1.1]. BMP pending today.[JR1.2]   -[JR1.1] N[JR1.2]o complaints of chest pressure[JR1.1] since[JR1.2] 1/19, continue regimen as above[JR1.1].[JR1.2]      CAP, possible lung nodules:   Admission CT chest with patchy nodular  opacities, primarily of RLL, in addition to some mediastinal LAD.  No fever or leukocytosis.  Initiated on levofloxacin 1/13.  Flu swab negative 1/17.  Repeat CXR 1/18 consistent with RLL PNA.  Procalcitonin 1/18 low risk.  - follow up CT as outpatient in 3 months pending goals of care  -[JR1.1] N[JR1.2]o significant improvement in resp status despite levo, but remains afebrile without leukocytosis; will switch back to levofloxacin to complete course as do not feel broadened coverage indicated given current evidence (curb-sided with ID)[JR1.1].[JR1.2]      Epigastric pain:    On 1/16 complaining of epigastric discomfort reproducible on palpation.  LFT's and lipase 1/16 wnl.    -[JR1.1] D[JR1.2]iscomfort persists and is reproducible on exam 1/19; will trial PPI[JR1.1].  - No Pain on exam 1/20.[JR1.2]      DM II:    Diet controlled.  A1C 6.9% this admission.     Chronic anemia:    Baseline hgb 10-11 g/dL, stable.     Hypothyroidism:    Continue PTA Synthroid.     Dementia:    Pleasantly confused and oriented to self.  Monitor for delirium.     Goals of care:    See discussion with daughter, Linh, in progress note 1/18.  Per discussion 1/19, her sister is in agreement that we can continue antibiotics and other meds to optimize her status, but they would not be interested in more aggressive testing.     DVT Prophylaxis: Pneumatic Compression Devices  Code Status: DNR/DNI     Disposition:[JR1.1] Stable resp status on 2 L at present.  If renal fxn is stable in am then pt can possibly discharge 1/21 back to her memory care unit vs TCU.[JR1.2]        Medhat Miller       Interval History[JR1.1]   No acute overnight events reported. Vitals stable. Saturating well on 2 L NC.[JR1.2]     -Data reviewed today: I reviewed all new labs and imaging results over the last 24 hours. I personally reviewed no images or EKG's today.    Physical Exam   Temp: 98.4  F (36.9  C) Temp src: Axillary BP: 124/61 Pulse: 98 Heart Rate: 94  Resp: 16 SpO2: 94 % O2 Device: Nasal cannula Oxygen Delivery: 2 LPM  Vitals:    01/18/18 0455 01/19/18 0300 01/20/18 0451   Weight: 64 kg (141 lb 3.2 oz) 64 kg (141 lb 3.2 oz) 64.4 kg (142 lb)     Vital Signs with Ranges  Temp:  [96.9  F (36.1  C)-98.8  F (37.1  C)] 98.4  F (36.9  C)  Pulse:  [98] 98  Heart Rate:  [85-96] 94  Resp:  [16-22] 16  BP: (113-135)/(61-77) 124/61  SpO2:  [92 %-95 %] 94 %  I/O last 3 completed shifts:  In: 340 [P.O.:340]  Out: 1100 [Urine:1100]    Gen: Patient in no acute distress.  Appears comfortable[JR1.1] sleeping. Arouses to voice and pleasantly confused.[JR1.2]   Heart:  S1S2+, regular rate and rhythm, No murmurs.  Lungs:  C[JR1.1]oarse BS b/l anteriorly, faint exp wheeze. Decreased posteriorly with no wheezing or rales.[JR1.2]    Abdomen:  Soft, non tender, non distended, bowel sounds positive.  Extremities:[JR1.1]  Trace pedal[JR1.2] edema.    Medications     - MEDICATION INSTRUCTIONS -       - MEDICATION INSTRUCTIONS -       Reason ACE/ARB/ARNI order not selected         omeprazole  40 mg Oral QAM AC     levofloxacin  250 mg Oral Daily     furosemide  10 mg Oral Daily     isosorbide mononitrate  15 mg Oral BID     metoprolol tartrate  6.25 mg Oral BID     lisinopril  2.5 mg Oral Daily     albuterol  2.5 mg Nebulization Q6H WA     acetaminophen (TYLENOL) tablet 1,000 mg  1,000 mg Oral TID     levothyroxine (SYNTHROID/LEVOTHROID) tablet 88 mcg  88 mcg Oral Daily     sodium chloride (PF)  3 mL Intracatheter Q8H     aspirin  324 mg Oral Once     pravastatin  40 mg Oral QPM     aspirin  81 mg Oral Daily       Data     Recent Labs  Lab 01/19/18  0515 01/18/18  0550 01/17/18  0539 01/16/18  0626 01/15/18  0550  01/13/18 2153 01/13/18  1745   WBC  --  4.1  --  5.3 3.8*  --   --   --    HGB  --  10.4*  --  10.3* 10.4*  --   --   --    MCV  --  94  --  95 96  --   --   --    PLT  --  198  --  206 232  --   --   --     134 132* 136 135  < >  --   --    POTASSIUM 4.3 4.4 4.2 4.1 4.0  <  >  --   --    CHLORIDE 99 97 96 98 99  < >  --   --    CO2 30 31 32 33* 31  < >  --   --    BUN 32* 28 21 23 22  < >  --   --    CR 1.23* 1.26* 1.07* 1.13* 1.07*  < >  --   --    ANIONGAP 7 6 4 5 5  < >  --   --    RADHA 8.6 8.4* 8.8 8.5 8.5  < >  --   --    * 114* 130* 146* 114*  < >  --   --    ALBUMIN  --   --   --  3.0*  --   --   --   --    PROTTOTAL  --   --   --  6.5*  --   --   --   --    BILITOTAL  --   --   --  0.5  --   --   --   --    ALKPHOS  --   --   --  48  --   --   --   --    ALT  --   --   --  12  --   --   --   --    AST  --   --   --  15  --   --   --   --    LIPASE  --   --   --  98  --   --   --   --    TROPI  --  0.038  --   --   --   --  0.060* 0.059*   < > = values in this interval not displayed.    No results found for this or any previous visit (from the past 24 hour(s)).[JR1.1]     Revision History        User Key Date/Time User Provider Type Action    > JR1.2 1/20/2018  9:35 AM Medhat Miller MD Physician Sign     JR1.1 1/20/2018  9:20 AM Medhat Miller MD Physician             Progress Notes by Owen Melara MD at 1/19/2018 10:23 AM     Author:  Owen Melara MD Service:  Hospitalist Author Type:  Physician    Filed:  1/19/2018  6:01 PM Date of Service:  1/19/2018 10:23 AM Creation Time:  1/19/2018 10:23 AM    Status:  Signed :  Owen Melara MD (Physician)         [facility]    Hospitalist Progress Note      Assessment & Plan   Olga Zelaya is a 89 year old female who was admitted on 1/13/2018.  Past history of dementia, DM II, HTN, hyperlipidemia, hypothyroidism who presents with chest pain and dyspnea, found to have NSTEMI and possible PNA.    NSTEMI:  Admission EKG without ischemic changes, serial troponin mildly elevated but flat.  HDL 61, LDL 95.  Managed medically per family wishes, completed 48-hours heparin gtt on 1/15.  TTE 1/14 with EF 25-30% with anteroapical and inferolateral hypokinesis (new from TTE 9/2015 in  [facility]).  Cardiology signed off.  - continue aspirin, pravastatin, lisinopril, metoprolol, Imdur (dose split bid due to intermittent hypotension), lasix (dose reduced 1/18 due to bump in Cr)  - Cr stable 1/19  - no complaints of chest pressure 1/19, continue regimen as above    CAP, possible lung nodules: Admission CT chest with patchy nodular opacities, primarily of RLL, in addition to some mediastinal LAD.  No fever or leukocytosis.  Initiated on levofloxacin 1/13.  Flu swab negative 1/17.  Repeat CXR 1/18 consistent with RLL PNA.  Procalcitonin 1/18 low risk.  - follow up CT as outpatient in 3 months pending goals of care  - no significant improvement in resp status despite levo, but remains afebrile without leukocytosis; will switch back to levofloxacin to complete course as do not feel broadened coverage indicated given current evidence (curb-sided with ID)    Epigastric pain:  On 1/16 complaining of epigastric discomfort reproducible on palpation.  LFT's and lipase 1/16 wnl.    - discomfort persists and is reproducible on exam 1/19; will trial PPI    DM II:  Diet controlled.  A1C 6.9% this admission.    Chronic anemia:  Baseline hgb 10-11 g/dL, stable.    Hypothyroidism:  Continue PTA Synthroid.    Dementia:  Pleasantly confused and oriented to self.  Monitor for delirium.    Goals of care:  See discussion with daughterLinh, in progress note 1/18.[PB1.1]  Per discussion 1/19, her sister is in agreement that we can continue antibiotics and other meds to optimize her status, but they would not be interested in more aggressive testing.[PB1.2]    DVT Prophylaxis: Pneumatic Compression Devices  Code Status: DNR/DNI    Disposition: Expected discharge[PB1.1] stable[PB1.2] respiratory status[PB1.1] and renal function[PB1.2], med optimization.  ?[PB1.1]1-[PB1.2]2 days    Updated daughter, Linh Welsh,[PB1.1] at bedside 1/19.[PB1.2]    Owen Melara    Interval History   Reports ongoing shortness of breath,  also seems to endorse some pleuritic chest discomfort.  Denies chest pain or pressure but again endorses some epigastric discomfort (though reproducible).  Denies fever/chills.    -Data reviewed today: I reviewed all new labs and imaging results over the last 24 hours. I personally reviewed no images or EKG's today.    Physical Exam   Temp: 98.2  F (36.8  C) Temp src: Oral BP: 116/73   Heart Rate: 98 Resp: 20 SpO2: 94 % O2 Device: Nasal cannula Oxygen Delivery: 2 LPM  Vitals:    01/17/18 0300 01/18/18 0455 01/19/18 0300   Weight: 63.9 kg (140 lb 12.8 oz) 64 kg (141 lb 3.2 oz) 64 kg (141 lb 3.2 oz)     Vital Signs with Ranges  Temp:  [97.5  F (36.4  C)-98.2  F (36.8  C)] 98.2  F (36.8  C)  Heart Rate:  [] 98  Resp:  [20-22] 20  BP: ()/(50-73) 116/73  SpO2:  [90 %-97 %] 94 %  I/O last 3 completed shifts:  In: 490 [P.O.:490]  Out: 1050 [Urine:1050]    Constitutional: Well developed, well nourished elderly female, sitting up in chair in no acute distress  Respiratory: Coarse lung sounds with scattered crackles bilaterally, no increased work of breathing  Cardiovascular: regular rate and rhythm, normal S1/S2 without murmur, rubs or gallops  GI: abdomen soft, epigastric tenderness to palpation, non-distended, normal bowel sounds  Lymph:  trace peripheral edema  Other:  alert, confused, cranial nerves grossly intact    Medications     - MEDICATION INSTRUCTIONS -       - MEDICATION INSTRUCTIONS -       Reason ACE/ARB/ARNI order not selected         furosemide  10 mg Oral Daily     ertapenem (INVanz) IV  500 mg Intravenous Q24H     isosorbide mononitrate  15 mg Oral BID     metoprolol tartrate  6.25 mg Oral BID     lisinopril  2.5 mg Oral Daily     albuterol  2.5 mg Nebulization Q6H WA     acetaminophen (TYLENOL) tablet 1,000 mg  1,000 mg Oral TID     levothyroxine (SYNTHROID/LEVOTHROID) tablet 88 mcg  88 mcg Oral Daily     sodium chloride (PF)  3 mL Intracatheter Q8H     aspirin  324 mg Oral Once     pravastatin   40 mg Oral QPM     aspirin  81 mg Oral Daily       Data     Recent Labs  Lab 01/19/18  0515 01/18/18  0550 01/17/18  0539 01/16/18  0626 01/15/18  0550  01/13/18  2153 01/13/18  1745   WBC  --  4.1  --  5.3 3.8*  --   --   --    HGB  --  10.4*  --  10.3* 10.4*  --   --   --    MCV  --  94  --  95 96  --   --   --    PLT  --  198  --  206 232  --   --   --     134 132* 136 135  < >  --   --    POTASSIUM 4.3 4.4 4.2 4.1 4.0  < >  --   --    CHLORIDE 99 97 96 98 99  < >  --   --    CO2 30 31 32 33* 31  < >  --   --    BUN 32* 28 21 23 22  < >  --   --    CR 1.23* 1.26* 1.07* 1.13* 1.07*  < >  --   --    ANIONGAP 7 6 4 5 5  < >  --   --    RADHA 8.6 8.4* 8.8 8.5 8.5  < >  --   --    * 114* 130* 146* 114*  < >  --   --    ALBUMIN  --   --   --  3.0*  --   --   --   --    PROTTOTAL  --   --   --  6.5*  --   --   --   --    BILITOTAL  --   --   --  0.5  --   --   --   --    ALKPHOS  --   --   --  48  --   --   --   --    ALT  --   --   --  12  --   --   --   --    AST  --   --   --  15  --   --   --   --    LIPASE  --   --   --  98  --   --   --   --    TROPI  --  0.038  --   --   --   --  0.060* 0.059*   < > = values in this interval not displayed.    No results found for this or any previous visit (from the past 24 hour(s)).[PB1.1]       Revision History        User Key Date/Time User Provider Type Action    > PB1.2 1/19/2018  6:01 PM Owen Melara MD Physician Sign     PB1.1 1/19/2018 10:23 AM Owen Melara MD Physician             Progress Notes by oJssie Hernandes RD, LD at 1/19/2018  8:36 AM     Author:  Jossie Hernandes RD, LD Service:  Nutrition Author Type:  Registered Dietitian    Filed:  1/19/2018  8:55 AM Date of Service:  1/19/2018  8:36 AM Creation Time:  1/19/2018  8:36 AM    Status:  Signed :  Jossie Hernandes RD, LD (Registered Dietitian)         CLINICAL NUTRITION SERVICES  -  ASSESSMENT NOTE      Recommendations Ordered by Registered Dietitian (RADHA):   Medical Food Supplement:  "Ensure BID   Malnutrition: Non-Severe malnutrition  In Context of:  Acute illness or injury        REASON FOR ASSESSMENT  Olga Zelaya is a 89 year old female seen by Registered Dietitian for LOS      NUTRITION HISTORY  -Pt with hx of dementia and living assisted living facility in memory care unit. Per RN note, pt alert to self only. Unable to get full nutrition history at this time. Pt sleeping on attempt to see   -Pt with shellfish allergy     CURRENT NUTRITION ORDERS  Diet Order:     Low Saturated Fat/2400 mg Sodium     Current Intake/Tolerance:  -Per RN flowsheet, pt consuming 25%-50% of meals ordered   -1/16: Per RN note \"appetite poor on cardiac diet\"  -Per MD note - \"On 1/16 complaining of epigastric discomfort reproducible on palpation.\"  -Per RN notes, pt c/o of nausea - Zofran  -Wt fairly stable pt on lasix       PHYSICAL FINDINGS  Observed  Muscle Wasting   Subcutaneous fat loss   Obtained from Chart/Interdisciplinary Team  1/18: SLP - \"Pt presents with no appreciable dysphagia on limited clinical swallow evaluation\"  1/18: SLP - \"ST evaluation attempted, however, pt reported epigastric pain and adamantly refused PO at this time\"    ANTHROPOMETRICS  Height: 5'6\"  Weight: 141 lbs 3.2 oz (64kg)  Body mass index is 22.79 kg/(m^2).  Weight Status:  Normal BMI  IBW: 59 kg   % IBW: 108%  Weight History: wt loss of 2.8kg (4%) over the last month. Difficult to assess at this time due to fluid status and pt on lasix this admission[AP1.1]   Wt Readings from Last 10 Encounters:   01/19/18 64 kg (141 lb 3.2 oz)   12/01/17 66.8 kg (147 lb 4.3 oz)   04/26/17 76.2 kg (167 lb 14.4 oz)   04/24/17 69.9 kg (154 lb)   08/09/13 54.4 kg (120 lb)   03/31/13 72.6 kg (160 lb)   03/31/13 72.6 kg (160 lb)   02/20/12 63.5 kg (140 lb)[AP1.2]         LABS  Labs reviewed    MEDICATIONS  Medications reviewed    Dosing Weight 64 kg (actual wt from 1/19)    ASSESSED NUTRITION NEEDS PER APPROVED PRACTICE GUIDELINES:  Estimated Energy " Needs: 1171-5351 kcals (25-30 Kcal/Kg)  Justification: maintenance  Estimated Protein Needs: 77-96 grams protein (1.2-1.5 g pro/Kg)  Justification: preservation of lean body mass  Estimated Fluid Needs: 2780-6543 mL (25-30 mL/kg)  Justification: maintenance    MALNUTRITION:  % Weight Loss:  Weight loss does not meet criteria for malnutrition   % Intake: Unable to assess due to limited diet history   Subcutaneous Fat Loss:  Orbital region mild depletion  Muscle Loss:  Temporal region mild-moderate depletion, Clavicle bone region mild depletion and Acromion bone region mild depletion  Fluid Retention: right and left ankle and foot 1+ (trace) edema     Malnutrition Diagnosis: Non-Severe malnutrition  In Context of:  Acute illness or injury    NUTRITION DIAGNOSIS:  Inadequate oral intake related to decreased appetite and nausea as evidenced by RN reports of pt consuming 25%-50% of meals with c/o of nausea and poor appetite       NUTRITION INTERVENTIONS  Recommendations / Nutrition Prescription  Continue diet as ordered   Meals TID   Ensure BID     Implementation  Nutrition education: Not appropriate at this time due to patient condition  Medical Food Supplement: Ensure BID      Nutrition Goals  Pt to consume at least 75% of meals ordered TID and oral nutrition supplements BID       MONITORING AND EVALUATION:  Progress towards goals will be monitored and evaluated per protocol and Practice Guidelines      Jossie Hernandes RD, LD[AP1.1]     Revision History        User Key Date/Time User Provider Type Action    > AP1.2 1/19/2018  8:55 AM Jossie Hernandes RD, LD Registered Dietitian Sign     AP1.1 1/19/2018  8:36 AM Jossie Hernandes RD, LD Registered Dietitian             Progress Notes by Alena Ballesteros RN at 1/18/2018  4:50 PM     Author:  Alena Ballesteros RN Service:  (none) Author Type:  Registered Nurse    Filed:  1/18/2018  4:51 PM Date of Service:  1/18/2018  4:50 PM Creation Time:  1/18/2018  4:50 PM     Status:  Signed :  Alena Ballesteros, RN (Registered Nurse)         Okay from Daughter Linh that if the patients granddaughter calls, its okay for staff to talk to her. The Granddaughters name is Myrna.[MC1.1]      Revision History        User Key Date/Time User Provider Type Action    > MC1.1 1/18/2018  4:51 PM Alena Ballesteros, RN Registered Nurse Sign            Progress Notes by Lety Camargo SLP at 1/18/2018  3:58 PM     Author:  Lety Camargo SLP Service:  (none) Author Type:  Speech Language Pathologist    Filed:  1/18/2018  3:58 PM Date of Service:  1/18/2018  3:58 PM Creation Time:  1/18/2018  3:58 PM    Status:  Signed :  Lety Camargo SLP (Speech Language Pathologist)            01/18/18 1552   General Information   Onset Date 01/13/18   Start of Care Date 01/18/18   Referring Physician Dr. Melara   Patient Profile Review/OT: Additional Occupational Profile Info See Profile for full history and prior level of function   Patient/Family Goals Statement did not state   Swallowing Evaluation Bedside swallow evaluation   Behaviorial Observations Alert;Confused;Distractible   Mode of current nutrition Oral diet   Type of oral diet Regular;Thin liquid   Respiratory Status O2 Supply   Type of O2 supply Nasal cannula   Comments Olga Zelaya is a 89 year old female who was admitted on 1/13/2018.  Past history of dementia, DM II, HTN, hyperlipidemia, hypothyroidism who presents with chest pain and dyspnea, found to have NSTEMI and possible PNA.   Clinical Swallow Evaluation   Oral Musculature unable to assess due to poor participation/comprehension   Oral Musculature Comments unable to follow commands or case history questions   Additional Documentation Yes   Clinical Swallow Eval: Thin Liquid Texture Trial   Mode of Presentation, Thin Liquids cup;self-fed   Oral Phase of Swallow WFL   Pharyngeal Phase of Swallow intact   Diagnostic Statement no overt s/sx of aspiration   Clinical Swallow Eval:  Puree Solid Texture Trial   Mode of Presentation, Puree spoon;self-fed   Oral Phase, Puree WFL   Pharyngeal Phase, Puree intact   Diagnostic Statement no overt s/sx of aspiration   Clinical Swallow Eval: Solid Food Texture Trial   Mode of Presentation, Solid self-fed   Oral Phase, Solid WFL   Pharyngeal Phase, Solid intact   Diagnostic Statement no overt s/sx of aspiration   Swallow Eval: Clinical Impressions   Skilled Criteria for Therapy Intervention No problems identified which require skilled intervention   Functional Assessment Scale (FAS) 7   Diet texture recommendations Regular diet;Thin liquids   Recommended Feeding/Eating Techniques alternate between small bites and sips of food/liquid;check mouth frequently for oral residue/pocketing;hard swallow w/ each bite or sip;small sips/bites   Anticipated Discharge Disposition extended care facility   Risks and Benefits of Treatment have been explained. Yes   Patient, family and/or staff in agreement with Plan of Care Yes   Clinical Impression Comments Pt presents with no appreciable dysphagia on limited clinical swallow evaluation. Pt unable to follow commands for oral motor exam or provide responses to history questions. Pt upright in a chair for exam. Pt drinking thin liquids via cup and agreeable to trial small amounts of puree solids and regular texture crackers. Adequate oral phase with no overt s/sx of aspiration throughout. Pt reported continued discomfort and requested to hold PO. Recommended: continue regular textures with thin liquids with pt upright in a chair for meals. No further ST need identified at this time. Will sign off.    Total Evaluation Time   Total Evaluation Time (Minutes) 13[AQ1.1]        Revision History        User Key Date/Time User Provider Type Action    > AQ1.1 1/18/2018  3:58 PM Lety Camargo, SLP Speech Language Pathologist Sign            Progress Notes by Owen Melara MD at 1/18/2018  8:43 AM     Author:  Owen Melara MD  Service:  Hospitalist Author Type:  Physician    Filed:  1/18/2018 12:28 PM Date of Service:  1/18/2018  8:43 AM Creation Time:  1/18/2018  8:43 AM    Status:  Addendum :  Owen Melara MD (Physician)         [facility]    Hospitalist Progress Note      Assessment & Plan   Olga Zelaya is a 89 year old female who was admitted on 1/13/2018.  Past history of dementia, DM II, HTN, hyperlipidemia, hypothyroidism who presents with chest pain and dyspnea, found to have NSTEMI and possible PNA.    NSTEMI:  Admission EKG without ischemic changes, serial troponin mildly elevated but flat.  HDL 61, LDL 95.  Managed medically per family wishes, completed 48-hours heparin gtt on 1/15.  TTE 1/14 with EF 25-30% with anteroapical and inferolateral hypokinesis (new from TTE 9/2015 in [facility]).  Cardiology signed off.  - continue aspirin, pravastatin, lisinopril, metoprolol  - Imdur increased to 30 mg daily 1/17 as continuing to report chest pressure (will switch to 15 mg bid as having intermittent hypotension and more frequently reporting pressure in AM prior to meds)  - lasix resumed at 20 mg po daily on 1/17 with increase in Cr 1/18, will decrease to 10 mg daily and monitor BMP  - will repeat EKG and add-on trop given persistent reports of chest pressure[PB1.1]  ADDENDUM:  EKG unchanged and troponin wnl.[PB1.2]    CAP, possible lung nodules: Admission CT chest with patchy nodular opacities, primarily of RLL, in addition to some mediastinal LAD.  No fever or leukocytosis.  Initiated on levofloxacin 1/13.  Flu swab negative 1/17.  - follow up CT as outpatient in 3 months  - continues to report dyspnea though does not appear volume overloaded  - CXR 1/18 with right lower lobe infiltrates consistent with prior CT chest, formal read pending  - slight increase in fever curve (is on scheduled tylenol) and oxygen requirements past 24 hours; will switch levo to ertapenem (PCN allergy) and check  procalcitonin    Epigastric pain:  On 1/16 complaining of epigastric discomfort reproducible on palpation.  LFT's and lipase 1/16 wnl.    - unclear if significant improvement with GI cocktail, does not appear to respond to nitro; am concerned this represents anginal equivalent    DM II:  Diet controlled.  A1C 6.9% this admission.    Chronic anemia:  Baseline hgb 10-11 g/dL, stable.    Hypothyroidism:  Continue PTA Synthroid.    Dementia:  Monitor for delirium.    DVT Prophylaxis: Pneumatic Compression Devices  Code Status: DNR/DNI    Disposition: Expected discharge pending improvement in respiratory status, med optimization.  ?2 days[PB1.1]    Updated daughter, Linh Welsh, via phone on 1/18.  Discussed that we have not seen any improvement in respiratory or cardiac complaints despite medical therapy.  Discussed the small tweaks to meds and antibiotic change made today, but raised my concern that if we do not see improvement soon, we may not see any at all.  Raised the prospect of transition to comfort care and focusing on symptoms.  Daughter reports her mother has stated in the past she would not want recurrent hospitalization or aggressive treatments and daughter reports her mother has lost interest in things she normally enjoys (puzzles).  She agrees with the changes made today, but if we are not seeing improvement in the near future, would likely favor a comfort-focused approach.  She will contact her sister who lives in [facility] to discuss this.[PB1.2]    Owen Melara    Interval History   Called early this morning for report of epigastric discomfort and dyspnea which did not respond to albuterol or nitro.  Upon assessment, patient reports substernal pressure which is persistent, mild dyspnea and general fatigue.  No complaints of cough or fever/chills, however, her memory is quite poor.    -Data reviewed today: I reviewed all new labs and imaging results over the last 24 hours. I personally reviewed no images  or EKG's today.    Physical Exam   Temp: 99.2  F (37.3  C) Temp src: Oral BP: 109/57   Heart Rate: 99 Resp: 24 SpO2: 92 % O2 Device: Nasal cannula Oxygen Delivery: 3 LPM  Vitals:    01/16/18 0500 01/17/18 0300 01/18/18 0455   Weight: 64.2 kg (141 lb 9.6 oz) 63.9 kg (140 lb 12.8 oz) 64 kg (141 lb 3.2 oz)     Vital Signs with Ranges  Temp:  [97.8  F (36.6  C)-99.2  F (37.3  C)] 99.2  F (37.3  C)  Heart Rate:  [] 99  Resp:  [18-24] 24  BP: ()/(47-74) 109/57  SpO2:  [91 %-96 %] 92 %  I/O last 3 completed shifts:  In: 700 [P.O.:700]  Out: 275 [Urine:275]    Constitutional: Well developed, well nourished elderly female, sitting up in chair in no acute distress  Respiratory: Mild wheezing bilaterally, crackles seem more distributed today, no tachypnea  Cardiovascular: regular rate and rhythm, normal S1/S2 without murmur, rubs or gallops  GI: abdomen soft, nontender, non-distended, normal bowel sounds  Lymph:  No peripheral edema  Other:  alert, confused, cranial nerves grossly intact    Medications     - MEDICATION INSTRUCTIONS -       - MEDICATION INSTRUCTIONS -       Reason ACE/ARB/ARNI order not selected         furosemide  10 mg Oral Daily     ertapenem (INVanz) IV  500 mg Intravenous Q24H     metoprolol tartrate  6.25 mg Oral BID     isosorbide mononitrate  30 mg Oral Daily     lisinopril  2.5 mg Oral Daily     albuterol  2.5 mg Nebulization Q6H WA     acetaminophen (TYLENOL) tablet 1,000 mg  1,000 mg Oral TID     levothyroxine (SYNTHROID/LEVOTHROID) tablet 88 mcg  88 mcg Oral Daily     sodium chloride (PF)  3 mL Intracatheter Q8H     aspirin  324 mg Oral Once     pravastatin  40 mg Oral QPM     aspirin  81 mg Oral Daily       Data     Recent Labs  Lab 01/18/18  0550 01/17/18  0539 01/16/18  0626 01/15/18  0550  01/13/18  2153 01/13/18  1745 01/13/18  1430   WBC 4.1  --  5.3 3.8*  --   --   --   --    HGB 10.4*  --  10.3* 10.4*  --   --   --   --    MCV 94  --  95 96  --   --   --   --      --  206  232  --   --   --   --     132* 136 135  < >  --   --   --    POTASSIUM 4.4 4.2 4.1 4.0  < >  --   --   --    CHLORIDE 97 96 98 99  < >  --   --   --    CO2 31 32 33* 31  < >  --   --   --    BUN 28 21 23 22  < >  --   --   --    CR 1.26* 1.07* 1.13* 1.07*  < >  --   --   --    ANIONGAP 6 4 5 5  < >  --   --   --    RADHA 8.4* 8.8 8.5 8.5  < >  --   --   --    * 130* 146* 114*  < >  --   --   --    ALBUMIN  --   --  3.0*  --   --   --   --   --    PROTTOTAL  --   --  6.5*  --   --   --   --   --    BILITOTAL  --   --  0.5  --   --   --   --   --    ALKPHOS  --   --  48  --   --   --   --   --    ALT  --   --  12  --   --   --   --   --    AST  --   --  15  --   --   --   --   --    LIPASE  --   --  98  --   --   --   --   --    TROPI  --   --   --   --   --  0.060* 0.059* 0.060*   < > = values in this interval not displayed.    No results found for this or any previous visit (from the past 24 hour(s)).[PB1.1]       Revision History        User Key Date/Time User Provider Type Action    > PB1.2 1/18/2018 12:28 PM Owen Melara MD Physician Addend     PB1.1 1/18/2018  8:55 AM Owen Melara MD Physician Sign                  Procedure Notes     No notes of this type exist for this encounter.      Progress Notes - Therapies (Notes from 01/18/18 through 01/21/18)     No notes of this type exist for this encounter.

## 2018-01-13 NOTE — ED NOTES
Bed: ED05  Expected date: 1/13/18  Expected time:   Means of arrival:   Comments:  [facility] 89 f/cp

## 2018-01-13 NOTE — IP AVS SNAPSHOT
` [place] [address] ONCOLOGY: [phone]            Medication Administration Report for Olga Zelaya as of 01/21/18 1550   Legend:    Given Hold Not Given Due Canceled Entry Other Actions    Time Time (Time) Time  Time-Action       Inactive    Active    Linked        Medications 01/15/18 01/16/18 01/17/18 01/18/18 01/19/18 01/20/18 01/21/18    acetaminophen (TYLENOL) Suppository 650 mg  Dose: 650 mg Freq: EVERY 4 HOURS PRN Route: RE  PRN Reason: mild pain  Start: 01/13/18 1147   Admin Instructions: Maximum acetaminophen dose from all sources = 75 mg/kg/day not to exceed 4 grams/day.               acetaminophen (TYLENOL) tablet 1,000 mg  Dose: 1,000 mg Freq: 3 TIMES DAILY Route: PO  Start: 01/13/18 1600   Admin Instructions: Maximum acetaminophen dose from all sources = 75 mg/kg/day not to exceed 4 gram     0823 (1,000 mg)-Given       1744 (1,000 mg)-Given       2212 (1,000 mg)-Given        0945 (1,000 mg)-Given       1656 (1,000 mg)-Given       2111 (1,000 mg)-Given        0922 (1,000 mg)-Given       1604 (1,000 mg)-Given       2130 (1,000 mg)-Given        0713 (1,000 mg)-Given              1740 (1,000 mg)-Given       2102 (1,000 mg)-Given        0927 (1,000 mg)-Given       1719 (1,000 mg)-Given       (2151)-Not Given        1044 (1,000 mg)-Given       (1627)-Not Given       2122 (1,000 mg)-Given        0845 (1,000 mg)-Given       [ ] 1600       [ ] 2200           acetaminophen (TYLENOL) tablet 650 mg  Dose: 650 mg Freq: EVERY 4 HOURS PRN Route: PO  PRN Reason: mild pain  Start: 01/13/18 1147   Admin Instructions: Maximum acetaminophen dose from all sources = 75 mg/kg/day not to exceed 4 grams/day.               albuterol neb solution 2.5 mg  Dose: 2.5 mg Freq: EVERY 2 HOURS PRN Route: NEBULIZATION  PRN Reason: other  PRN Comment: dyspnea  Start: 01/17/18 0818              albuterol neb solution 2.5 mg  Dose: 2.5 mg Freq: EVERY 6 HOURS WHILE AWAKE Route: NEBULIZATION  Start: 01/15/18 0945    1013  (2.5 mg)-Given       1343 (2.5 mg)-Given       (1947)-Not Given        0313 (2.5 mg)-Given       0819 (2.5 mg)-Given       (1506)-Not Given       2005 (2.5 mg)-Given        0754 (2.5 mg)-Given       1231 (2.5 mg)-Given       2004 (2.5 mg)-Given        0652 (2.5 mg)-Given       1247 (2.5 mg)-Given       1927 (2.5 mg)-Given        0705 (2.5 mg)-Given       1352 (2.5 mg)-Given       2019 (2.5 mg)-Given        0733 (2.5 mg)-Given       1247 (2.5 mg)-Given       1915 (2.5 mg)-Given        (0817)-Not Given       1259 (2.5 mg)-Given       [ ] 1900           alum & mag hydroxide-simethicone (MYLANTA ES/MAALOX  ES) suspension 30 mL  Dose: 30 mL Freq: EVERY 4 HOURS PRN Route: PO  PRN Reason: indigestion  Start: 01/13/18 1147   Admin Instructions: Shake well.        0608 (30 mL)-Given              aspirin chewable tablet 324 mg  Dose: 324 mg Freq: ONCE Route: PO  Start: 01/13/18 1200   Admin Instructions: if not given in ED or by EMS               aspirin chewable tablet 81 mg  Dose: 81 mg Freq: DAILY Route: PO  Start: 01/14/18 0900    0822 (81 mg)-Given        0945 (81 mg)-Given        0921 (81 mg)-Given        0902 (81 mg)-Given        0927 (81 mg)-Given        1045 (81 mg)-Given        0845 (81 mg)-Given           bisacodyl (DULCOLAX) Suppository 10 mg  Dose: 10 mg Freq: DAILY PRN Route: RE  PRN Reason: constipation  Start: 01/13/18 1147   Admin Instructions: Hold for loose stools.  This is the third step of a three step constipation treatment.               calcium carbonate (TUMS) chewable tablet 500-1,000 mg  Dose: 500-1,000 mg Freq: EVERY 2 HOURS PRN Route: PO  PRN Reason: heartburn  Start: 01/17/18 0257   Admin Instructions: Do not give if calcium level greater than 10 mg/dL.               furosemide (LASIX) half-tab 10 mg  Dose: 10 mg Freq: DAILY Route: PO  Start: 01/18/18 0900       1109 (10 mg)-Given        0926 (10 mg)-Given        1044 (10 mg)-Given        0845 (10 mg)-Given           guaiFENesin-dextromethorphan  (ROBITUSSIN DM) 100-10 MG/5ML syrup 5 mL  Dose: 5 mL Freq: EVERY 4 HOURS PRN Route: PO  PRN Reason: cough  Start: 01/19/18 1114              HYDROmorphone (PF) (DILAUDID) injection 0.3-0.5 mg  Dose: 0.3-0.5 mg Freq: EVERY 2 HOURS PRN Route: IV  PRN Reason: severe pain  PRN Comment: IF unable to take PO.  Hold while on PCA.  Start: 01/13/18 1147   Admin Instructions: Start at the lowest dose.  May adjust dose by 0.1 mg every 2 hours as needed for pain control or improvement in physical function.  Hold dose for analgesic side effects.  Notify provider to assess for uncontrolled pain or analgesic side effects.  For ordered doses up to 4 mg give IV Push undiluted. Administer each 2mg over 2-5 minutes.               isosorbide mononitrate (IMDUR) 24 hr half-tab 15 mg  Dose: 15 mg Freq: 2 TIMES DAILY Route: PO  Start: 01/18/18 0900   Admin Instructions: DO NOT CRUSH. Can split tablet in half along score rocky.hold if systolic<100        1109 (15 mg)-Given       2018 (15 mg)-Given        0926 (15 mg)-Given       2028 (15 mg)-Given        1045 (15 mg)-Given       2122 (15 mg)-Given        0845 (15 mg)-Given       [ ] 2100           levofloxacin (LEVAQUIN) tablet 250 mg  Dose: 250 mg Freq: DAILY Route: PO  Indications of Use: COMMUNITY ACQUIRED PNEUMONIA  Start: 01/19/18 1045   Admin Instructions: Administer at least 2 hrs before or 4 hrs after aluminum, calcium, iron, zinc or magnesium containing products.         1124 (250 mg)-Given        1045 (250 mg)-Given        0844 (250 mg)-Given           levothyroxine (SYNTHROID/LEVOTHROID) tablet 88 mcg  Dose: 88 mcg Freq: DAILY Route: PO  Start: 01/13/18 1200    0822 (88 mcg)-Given        0945 (88 mcg)-Given        0921 (88 mcg)-Given        0902 (88 mcg)-Given        0927 (88 mcg)-Given        1044 (88 mcg)-Given        0844 (88 mcg)-Given           lidocaine (LMX4) cream  Freq: EVERY 1 HOUR PRN Route: Top  PRN Reason: pain  PRN Comment: with VAD insertion or accessing implanted  "port.  Start: 01/13/18 1147   Admin Instructions: Do NOT give if patient has a history of allergy to any local anesthetic or any \"katei\" product.   Apply 30 minutes prior to VAD insertion or port access.  MAX Dose:  2.5 g (  of 5 g tube)               lidocaine 1 % 1 mL  Dose: 1 mL Freq: EVERY 1 HOUR PRN Route: OTHER  PRN Comment: mild pain with VAD insertion or accessing implanted port  Start: 01/13/18 1147   Admin Instructions: Do NOT give if patient has a history of allergy to any local anesthetic or any \"katie\" product. MAX dose 1 mL subcutaneous OR intradermal in divided doses.               lisinopril (PRINIVIL/Zestril) tablet 2.5 mg  Dose: 2.5 mg Freq: DAILY Route: PO  Start: 01/16/18 0900   Admin Instructions: Hold for SBP < 100      0945 (2.5 mg)-Given        0921 (2.5 mg)-Given        0902 (2.5 mg)-Given        0926 (2.5 mg)-Given        1044 (2.5 mg)-Given        0844 (2.5 mg)-Given           magnesium hydroxide (MILK OF MAGNESIA) suspension 30 mL  Dose: 30 mL Freq: DAILY PRN Route: PO  PRN Reasons: constipation,heartburn  Start: 01/13/18 1147   Admin Instructions: Shake well.               medication instruction  Freq: CONTINUOUS PRN Route: XX  Start: 01/13/18 1147   Admin Instructions: No IV fluids               metoprolol tartrate (LOPRESSOR) quarter-tab 6.25 mg  Dose: 6.25 mg Freq: 2 TIMES DAILY Route: PO  Start: 01/17/18 0900   Admin Instructions: Hold IF Heart Rate less than 55 or IF systolic blood pressure less than 85 mmHg       1111 (6.25 mg)-Given       2130 (6.25 mg)-Given        0902 (6.25 mg)-Given       2018 (6.25 mg)-Given        0927 (6.25 mg)-Given       2028 (6.25 mg)-Given        1045 (6.25 mg)-Given       2123 (6.25 mg)-Given        0845 (6.25 mg)-Given       [ ] 2100           naloxone (NARCAN) injection 0.1-0.4 mg  Dose: 0.1-0.4 mg Freq: EVERY 2 MIN PRN Route: IV  PRN Reason: opioid reversal  Start: 01/13/18 1147   Admin Instructions: For respiratory rate LESS than or EQUAL to 8.  " Partial reversal dose:  0.1 mg titrated q 2 minutes for Analgesia Side Effects Monitoring Sedation Level of 3 (frequently drowsy, arousable, drifts to sleep during conversation).Full reversal dose:  0.4 mg bolus for Analgesia Side Effects Monitoring Sedation Level of 4 (somnolent, minimal or no response to stimulation).  For ordered doses up to 2mg give IVP. Give each 0.4mg over 15 seconds in emergency situations. For non-emergent situations further dilute in 9mL of NS to facilitate titration of response.               nitroGLYcerin (NITROSTAT) sublingual tablet 0.4 mg  Dose: 0.4 mg Freq: EVERY 5 MIN PRN Route: SL  PRN Reason: chest pain  Start: 01/13/18 1147     0357 (0.4 mg)-Given [C]         0630 (0.4 mg)-Given              omeprazole (priLOSEC) CR capsule 40 mg  Dose: 40 mg Freq: EVERY MORNING BEFORE BREAKFAST Route: PO  Start: 01/19/18 1030        1123 (40 mg)-Given        1045 (40 mg)-Given        0831 (40 mg)-Given           ondansetron (ZOFRAN-ODT) ODT tab 4 mg  Dose: 4 mg Freq: EVERY 6 HOURS PRN Route: PO  PRN Reasons: nausea,vomiting  Start: 01/13/18 1147   Admin Instructions: This is Step 1 of nausea and vomiting management.  If nausea not resolved in 15 minutes, go to Step 2 prochlorperazine (COMPAZINE). Do not push through foil backing. Peel back foil and gently remove. Place on tongue immediately. Administration with liquid unnecessary  With dry hands, peel back foil backing and gently remove tablet; do not push oral disintegrating tablet through foil backing; administer immediately on tongue and oral disintegrating tablet dissolves in seconds; then swallow with saliva; liquid not required.     0547 (4 mg)-Given                               1244 (4 mg)-Given       2046 (4 mg)-Given             Or  ondansetron (ZOFRAN) injection 4 mg  Dose: 4 mg Freq: EVERY 6 HOURS PRN Route: IV  PRN Reasons: nausea,vomiting  Start: 01/13/18 1147   Admin Instructions: This is Step 1 of nausea and vomiting management.   If nausea not resolved in 15 minutes, go to Step 2 prochlorperazine (COMPAZINE).  Irritant. For ordered doses up to 4 mg, give IV Push undiluted over 2-5 minutes.             0628 (4 mg)-Given        0920 (4 mg)-Given        0643 (4 mg)-Given                            oxyCODONE IR (ROXICODONE) tablet 5-10 mg  Dose: 5-10 mg Freq: EVERY 3 HOURS PRN Route: PO  PRN Reason: moderate to severe pain  Start: 01/13/18 1147   Admin Instructions: Start with the lowest dose.  May adjust dose by 5 mg every 3 hours as needed for pain control or improvement in physical function.  Hold dose for analgesic side effects.  Notify provider to assess for uncontrolled pain or analgesic side effects.               Patient is already receiving anticoagulation with heparin, enoxaparin (LOVENOX), warfarin (COUMADIN)  or other anticoagulant medication  Freq: CONTINUOUS PRN Route: XX  Start: 01/13/18 1147              polyethylene glycol (MIRALAX/GLYCOLAX) Packet 17 g  Dose: 17 g Freq: DAILY PRN Route: PO  PRN Reason: constipation  Start: 01/13/18 1147   Admin Instructions: Give in 8oz of  water, juice, or soda. Hold for loose stools.  This is the second step of a three step constipation treatment.  1 Packet = 17 grams. Mixed prescribed dose in 8 ounces of water. Follow with 8 oz. of water.               pravastatin (PRAVACHOL) tablet 40 mg  Dose: 40 mg Freq: EVERY EVENING Route: PO  Start: 01/13/18 2000 2212 (40 mg)-Given        2111 (40 mg)-Given        2130 (40 mg)-Given        2018 (40 mg)-Given        1937 (40 mg)-Given        2123 (40 mg)-Given        [ ] 2000           prochlorperazine (COMPAZINE) injection 5 mg  Dose: 5 mg Freq: EVERY 6 HOURS PRN Route: IV  PRN Reasons: nausea,vomiting  Start: 01/13/18 1147   Admin Instructions: This is Step 2 of nausea and vomiting management. Give if nausea not resolved 15 minutes after giving ondansetron (ZOFRAN). If nausea not resolved in 15 minutes, go to Step 3 metoclopramide (REGLAN), if  ordered.  For ordered doses up to 10 mg, give IV Push undiluted. Each 5mg over 1 minute.      0808 (5 mg)-Given               Or  prochlorperazine (COMPAZINE) tablet 5 mg  Dose: 5 mg Freq: EVERY 6 HOURS PRN Route: PO  PRN Reason: vomiting  Start: 01/13/18 1147   Admin Instructions: This is Step 2 of nausea and vomiting management. Give if nausea not resolved 15 minutes after giving ondansetron (ZOFRAN). If nausea not resolved in 15 minutes, go to Step 3 metoclopramide (REGLAN), if ordered.                     Or  prochlorperazine (COMPAZINE) Suppository 12.5 mg  Dose: 12.5 mg Freq: EVERY 12 HOURS PRN Route: RE  PRN Reasons: nausea,vomiting  Start: 01/13/18 1147   Admin Instructions: This is Step 2 of nausea and vomiting management. Give if nausea not resolved 15 minutes after giving ondansetron (ZOFRAN). If nausea not resolved in 15 minutes, go to Step 3 metoclopramide (REGLAN), if ordered.                      Reason ACE/ARB/ARNI order not selected  Freq: DOES NOT GO TO MAR Route: OTHER  PRN Reason: other  Start: 01/13/18 1147   Admin Instructions: Not Indicated - Patient has Acute coronary Syndrome - NO Acute MI               sennosides (SENOKOT) tablet 1 tablet  Dose: 1 tablet Freq: DAILY PRN Route: PO  PRN Reason: constipation  Start: 01/13/18 1147              sodium chloride (PF) 0.9% PF flush 3 mL  Dose: 3 mL Freq: EVERY 8 HOURS Route: IK  Start: 01/13/18 1200   Admin Instructions: And Q1H PRN, to lock peripheral IV dormant line.     (0543)-Not Given       1513 (3 mL)-Given       2216 (3 mL)-Given        0634 (3 mL)-Given       1340 (3 mL)-Given        1121 (3 mL)-Given                     2134 (3 mL)-Given        (0525)-Not Given       (1435)-Not Given       2021 (3 mL)-Given        0657 (3 mL)-Given       1126 (3 mL)-Given       1938 (3 mL)-Given        0439 (3 mL)-Given       1048 (3 mL)-Given              2242 (3 mL)-Given        (0701)-Not Given       [ ] 1200       [ ] 2000           sodium chloride  (PF) 0.9% PF flush 3 mL  Dose: 3 mL Freq: EVERY 1 HOUR PRN Route: IK  PRN Reason: line flush  PRN Comment: for peripheral IV flush post IV meds  Start: 01/13/18 1147             Discontinued Medications  Medications 01/15/18 01/16/18 01/17/18 01/18/18 01/19/18 01/20/18 01/21/18         Dose: 500 mg Freq: EVERY 24 HOURS Route: IV  Indications of Use: HEALTHCARE-ASSOCIATED PNEUMONIA  Start: 01/18/18 1000   End: 01/19/18 1031   Admin Instructions: Infuse over 30 minutes.  Give IVP over 5 minutes        1027 (500 mg)-Given        1031-Med Discontinued  (1115)-Not Given

## 2018-01-13 NOTE — H&P
PRIMARY CARE PHYSICIAN:  Dr. Juan Carlos Perez with [facility].      CHIEF COMPLAINT:  Chest pain and shortness of breath.      HISTORY OF PRESENT ILLNESS:  Ms. Olga Zelaya is a 89-year-old female with history of dementia, hypertension, hyperlipidemia, hypothyroidism, diabetes mellitus type 2, diet-controlled.  She is at [facility] in [place].  Complained of chest pain this morning at 5:30-6:00 a.m.  She woke up and she was clutching at her chest and so the staff called EMS and the patient was also having shortness of breath and difficulty breathing during transport so she was given a DuoNeb during transportation.  In the emergency room, she was evaluated by Dr. Blankenship, the ER physician.  Her D-dimer was found to be elevated at 1.9.  A 12-lead EKG was done and it showed sinus rhythm, left anterior fascicular block and septal infarct, otherwise no acute signs of ischemia.  Troponin was mildly elevated at 0.069.  Because the D-dimer was elevated at 1.9, a CT scan of the chest for PE with contrast was done and it showed no evidence of PE, but there were new with small bilateral pleural effusions and bibasilar atelectasis, patchy nodular airspace opacities noted, dominantly seen at the right lower lobe, could be pneumonia but there is possibility of underlying malignancy so followup CT chest in 3 months was recommended for indeterminate finding.  So for the positive troponin, she was started on heparin drip and request for hospitalization was made.  Because of her history of dementia, most of the history is obtained from daughter, Linh who was at her bedside.  She otherwise denied any fevers or chills.  She has occasional chronic cough, which has been stable.  No dysuria, urgency or frequency.  No lightheadedness or any other complaints today.      PAST MEDICAL HISTORY:  Significant for:     1.  History of dementia.  She is at a chronic care facility.   2.  Diabetes mellitus type 2, diet-controlled.    3.  Hypertension.   4.  Hypothyroidism.   5.  Hyperlipidemia.      ALLERGIES:  Allergic to penicillin and shellfish.      SOCIAL HISTORY:  Not a smoker, no alcohol use.  She is .  She is at the care facility at [facility].      REVIEW OF SYSTEMS:  Ten-point review of systems was done and it is negative except as in HPI.      PHYSICAL EXAMINATION:   VITAL SIGNS:  Her temperature is 97.8 degrees Fahrenheit, pulse rate was initially rapid at 102, currently at 96, blood pressure 117/72.  Respiratory rate 10-18.  She is satting 94% on 2 liters by nasal cannula.   GENERAL:  She is alert, oriented, pleasantly confused female resting comfortably in bed, not in any acute distress.   HEENT:  Atraumatic, normocephalic.   NECK:  Supple.   HEART:  S1, S2 heard, no murmurs, rubs or gallops.   LUNGS:  Clear to auscultation, no rales, rhonchi or wheezing.   ABDOMEN:  Soft, nontender, nondistended, no hepatosplenomegaly.   EXTREMITIES:  No clubbing, cyanosis or edema.   NEUROLOGIC:  No focal weakness.   PSYCHIATRIC:  Mood and affect are normal.      LABORATORY AND RADIOLOGICAL INVESTIGATIONS:  As dictated above, CT scan of the chest showed multiple nodular densities, the maximum at 1.2 cm which could be pneumonia or underlying malignancy with consolidation, newly seen, but no pulmonary embolism.  Troponin is elevated at 0.069.  CBC showed WBC count of 5.7, hemoglobin 11.9, platelet count of 254.  BMP is reviewed and is normal with sodium 136, potassium 3.8, chloride 100, bicarbonate 27, anion gap 9, glucose 127, BUN 20, creatinine 0.98.  Troponin 0.06.  A 12-lead EKG reviewed and showed sinus rhythm with left anterior fascicular block and a septal infarct, otherwise no acute signs of ischemia.      ASSESSMENT AND PLAN:  A 89-year-old female with a history of dementia, hypertension, hyperlipidemia, diet-controlled diabetes mellitus, presenting with:   1.  Chest pain and shortness of breath, multifactorial.  With  elevated troponin, non-ST elevation myocardial infarction is a possibility as well and with the bilateral small pleural effusions, congestive heart failure exacerbation is contributing as well and she also has nodular densities with consolidation consistent with pneumonitis so she will be hospitalized and will continue the heparin drip which was started in the emergency room and serial troponins will be obtained.   Goals of care discussion was done with the daughter and she does not want her mom to have any invasive procedures like angiogram.  They want her to be treated medically so will continue the heparin drip, serial troponins.  An echocardiogram will be checked.   2.  Bilateral pleural effusions, could be congestive heart failure exacerbation so will gently diurese her with Lasix 20 mg IV daily.  Cardiology consultation will be obtained for medical management of her non-ST elevation myocardial infarction.   3.  Pneumonitis with community-acquired pneumonia.  Will treat her with IV Levaquin 500 mg daily and she will need to have repeat imaging of CT scan in 3 months to see there is no underlying malignancy contributing to her presentation and with a non-ST elevation myocardial infarction picture, will put her on low dose metoprolol 25 mg p.o. b.i.d. and aspirin and statin will be started on her.  Will check a fasting lipid panel tomorrow.   4.  Hypothyroidism.  Will continue levothyroxine.   5.  Code status was discussed with the daughter and she wants her to be DNR/DNI, which will be offered to her.  I am anticipating more than 2 nights' stay so she will be admitted as inpatient.     6.  Deep vein thrombosis prophylaxis.  She is on heparin drip.         FRITZ SCHREIBER MD             D: 2018 12:56   T: 2018 13:56   MT: DORIAN      Name:     BE MACEDO   MRN:      [id number]        Account:      [id number]   :      1923           Admitted:     [id number]      Document: [id number]        cc: Juan Carlos Perez MD

## 2018-01-13 NOTE — PLAN OF CARE
Problem: Patient Care Overview  Goal: Plan of Care/Patient Progress Review  SLP: Bedside swallow eval orders received. Pt known to ST caseload with previous recommendations for regular textures and thin liquids. Attempted to see pt for swallow eval, however pt was just finishing breakfast tray. Pt and daughter deny any difficulties swallowing and report good tolerance of current diet level. Will defer swallow eval and complete ST orders. Please re-consult should pts swallow function change.

## 2018-01-13 NOTE — ED NOTES
Spoke with pt's daughter, Linh, and updated on pt condition. Daughter states she is coming to the ED. Informed pt.

## 2018-01-13 NOTE — CONSULTS
[facility]    Cardiology Consultation     Date of Admission:  1/13/2018  Date of Consult (When I saw the patient): 01/13/18    Assessment & Plan   Olga Zelaya is a 89 year old female who was admitted on 1/13/2018. I was asked to see the patient for non-STEMI.    Active Problems:    ACS (acute coronary syndrome) (H)    Assessment: Based on the patient's account of her episode as well as her daughters, coupled with the laboratory data, and appears of the patient suffered a small NSTEMI. Based on the patient's age and comorbidities it is likely best serve that we manage this in an ischemic driven fashion.    Plan:     - Continue heparin for 48 hours.    - Patient is asymptomatic now but could add long-acting nitrate for symptom relief.    - Continue aspirin (81 mg), beta blocker, and statin    - Cardiology will sign off for now. This was discussed with Dr. Barnes.        Jacob Howard MD    Code Status    DNR/DNI    Reason for Consult   Reason for consult: I was asked by Dr. Crowell to evaluate this patient for NSTEMI    Primary Care Physician   ANTIONE LIMON    Chief Complaint   Chest pain and dyspnea.    History is obtained from the patient and her daughter.    History of Present Illness   Olga Zelaya is a 89 year old female who presents with a single episode of chest pain and dyspnea that awoke her this morning at approximately 6 AM. She has had no further symptoms since that time. She does not specifically recall the event, and details were gathered from her daughter. When I interviewed her she specifically denies chest pain, dyspnea, jaw or arm pain, claudication, lower extremity edema, orthopnea, paroxysmal nocturnal dyspnea, or any other cardiovascular or systemic signs or symptoms.      Past Medical History   I have reviewed this patient's medical history and updated it with pertinent information if needed.   Past Medical History:   Diagnosis Date     Dementia      Diabetes mellitus  (H)      High cholesterol      Hypothyroid        Past Surgical History   I have reviewed this patient's surgical history and updated it with pertinent information if needed.  Past Surgical History:   Procedure Laterality Date     APPENDECTOMY       BIOPSY ARTERY TEMPORAL Left 4/28/2017    Procedure: BIOPSY ARTERY TEMPORAL;  LEFT TEMPORAL ARTERY BIOPSY.;  Surgeon: Jem Jaramillo MD;  Location: [facility] OR     CHOLECYSTECTOMY         Prior to Admission Medications   Prior to Admission Medications   Prescriptions Last Dose Informant Patient Reported? Taking?   ACETAMINOPHEN PO 1/12/2018 at pm Nursing Home Yes Yes   Sig: Take 1,000 mg by mouth 3 times daily   ASCORBIC ACID PO 1/12/2018 at am Nursing Home Yes Yes   Sig: Take 1,000 mg by mouth daily   ASPIRIN PO 1/12/2018 at am Nursing Home Yes Yes   Sig: Take 81 mg by mouth daily   LEVOTHYROXINE SODIUM PO 1/12/2018 at am Nursing Home Yes Yes   Sig: Take 88 mcg by mouth daily   NITROGLYCERIN SL  at prn Nursing Home Yes Yes   Sig: Place 0.4 mg under the tongue every 5 minutes as needed for chest pain   calcium carbonate (TUMS) 500 MG chewable tablet  at prn Nursing Home Yes Yes   Sig: Take 1 chew tab by mouth 4 times daily as needed for heartburn   cholecalciferol (VITAMIN D) 1000 UNIT tablet 1/12/2018 at am Nursing Home Yes Yes   Sig: Take 1 tablet by mouth daily.   magnesium hydroxide (MILK OF MAGNESIA) 400 MG/5ML suspension  at prn Nursing Home Yes Yes   Sig: Take 30 mLs by mouth daily as needed for constipation or heartburn   mineral oil enema  at prn Nursing Home Yes Yes   Sig: Place 1 enema rectally once as needed for constipation   sennosides (SENOKOT) 8.6 MG tablet  at prn Nursing Home Yes Yes   Sig: Take 1 tablet by mouth daily as needed for constipation      Facility-Administered Medications: None     Allergies   Allergies   Allergen Reactions     Biaxin [Clarithromycin]      Codeine      Nabumetone      Penicillins      Seafood      Sulfa Drugs        Social  History   I have reviewed this patient's social history and updated it with pertinent information if needed. Olga Zelaya  reports that she has never smoked. She does not have any smokeless tobacco history on file. She reports that she does not drink alcohol or use illicit drugs.    Family History   I have reviewed this patient's family history and updated it with pertinent information if needed.   Family History   Problem Relation Age of Onset     DIABETES Mother        Review of Systems   The 10 point Review of Systems is negative other than noted in the HPI.    Physical Exam   Temp: 97.7  F (36.5  C) Temp src: Oral BP: 106/64 Pulse: 82 Heart Rate: 92 Resp: 18 SpO2: 96 % O2 Device: Nasal cannula Oxygen Delivery: 2 LPM  Vital Signs with Ranges  Temp:  [97.7  F (36.5  C)-97.8  F (36.6  C)] 97.7  F (36.5  C)  Pulse:  [] 82  Heart Rate:  [] 92  Resp:  [10-25] 18  BP: (106-149)/(64-99) 106/64  SpO2:  [94 %-97 %] 96 %  0 lbs 0 oz    Constitutional:Alert but not oriented   Hematologic / Lymphatic: Examined and normal.  Respiratory: Faint crackles and wheezes heard diffusely  Cardiovascular: Slight RV lift with normal JVP. S1 and S2 are normal with soft systolic murmur.  GI: No scars, normal bowel sounds, soft, non-distended, non-tender, no masses palpated, no hepatosplenomegally  Skin: No redness, warmth, or swelling  Musculoskeletal: 1+ lower extremity pitting edema present      Data   Most Recent 2 LFT's:  Recent Labs   Lab Test  11/30/17   1234  02/19/12   2250   AST  14  35   ALT  9  13   ALKPHOS  55  78   BILITOTAL  0.6  1.1     Most Recent 3 Creatinines:  Recent Labs   Lab Test  01/13/18   0635  12/01/17   0536  11/30/17   1234   CR  0.98  0.93  0.92     Most Recent 3 Hemoglobins:  Recent Labs   Lab Test  01/13/18   0635  12/01/17   0536  11/30/17   1234   HGB  11.9  9.9*  11.0*     Most Recent 3 Troponin's:  Recent Labs   Lab Test  01/13/18   1430  01/13/18   0635  11/29/17 1948   TROPI  0.060*   0.069*  0.019     Most Recent Urinalysis:  Recent Labs   Lab Test  11/30/17   0947   COLOR  Light Yellow   APPEARANCE  Clear   URINEGLC  Negative   URINEBILI  Negative   URINEKETONE  5*   SG  1.033   UBLD  Negative   URINEPH  6.0   PROTEIN  Negative   NITRITE  Negative   LEUKEST  Trace*   RBCU  2   WBCU  7*     Most Recent CPK:No lab results found.

## 2018-01-13 NOTE — IP AVS SNAPSHOT
` [place] [address] ONCOLOGY: [phone]                                              INTERAGENCY TRANSFER FORM - NURSING   2018                    Hospital Admission Date: 2018  BE MACEDO   : 1923  Sex: Female        Attending Provider: Emily Crowell MD     Allergies:  Biaxin [Clarithromycin], Codeine, Nabumetone, Penicillins, Seafood, Sulfa Drugs    Infection:  None   Service:  HOSPITALIST    Ht:  --   Wt:  64.4 kg (142 lb)   Admission Wt:  64.2 kg (141 lb 9.6 oz)    BMI:  --   BSA:  --            Patient PCP Information     Provider PCP Type    ANTIONE Byers      Current Code Status     Date Active Code Status Order ID Comments User Context       Prior      Code Status History     Date Active Date Inactive Code Status Order ID Comments User Context    2018  2:21 PM  DNR/DNI [id number]  Medhat Miller MD Outpatient    2018 11:47 AM 2018  2:21 PM DNR/DNI [id number]  Emily Crowell MD Inpatient    2017 11:05 AM 2018 11:47 AM DNR/DNI [id number]  Becka Fulton APRN CNP Outpatient    2017  1:47 AM 2017 11:05 AM DNR/DNI [id number]  Sarita Bronson MD Inpatient    2017 10:42 AM 2017  1:47 AM DNR/DNI [id number]  Teodoro Leonard MD Outpatient    2017  9:41 PM 2017 10:42 AM DNR/DNI [id number]  Patricio Ho MD Inpatient    2012 12:08 AM 2012  5:16 PM Full Code [id number]  Gareth Blanchard MD Inpatient      Advance Directives        Does patient have a scanned Advance Directive/ACP document in EPIC?           Yes        Hospital Problems as of 2018              Priority Class Noted POA    ACS (acute coronary syndrome) (H) Medium  2018 Yes      Non-Hospital Problems as of 2018              Priority Class Noted    Left elbow fracture Medium  2012    Type 2 diabetes, HbA1c goal < 7% (H) Medium  2012    HTN (hypertension) Medium  2012    Hypothyroidism Medium   "2/20/2012    Hyperlipidemia LDL goal <100 Medium  2/20/2012    Vision loss of left eye Medium  4/26/2017    Vision loss Medium  4/27/2017    Advance Care Planning Medium  8/4/2017    Fall Medium  11/30/2017    Orbital wall fracture (H) Medium  11/30/2017      Immunizations     Name Date      TD (ADULT, 7+) 03/31/13          END      ASSESSMENT     Discharge Profile Flowsheet     EXPECTED DISCHARGE     Referrals Placed  Emergent Admission to SNF/TCU 04/30/17 1139    Expected Discharge Date  01/21/18 (? back to memory care?) 01/19/18 1441   SKIN      DISCHARGE NEEDS ASSESSMENT     Inspection of bony prominences  Full 01/21/18 1248    Equipment Currently Used at Home  walker, standard;grab bar 01/14/18 1039   Inspection under devices  Full 01/21/18 1248    GASTROINTESTINAL (ADULT,PEDIATRIC,OB)     Skin WDL  ex 01/21/18 1248    GI WDL  WDL 01/21/18 1248   Skin Temperature  warm 01/21/18 1248    Last Bowel Movement  01/20/18 01/21/18 0006   Skin Moisture  dry 01/21/18 1248    GI Signs/Symptoms  nausea 01/21/18 1248   Skin Elasticity  slow return to original state 01/21/18 1248    Passing flatus  yes 01/21/18 1248   Skin Integrity  bruise(s);scar(s) 01/21/18 1248    COMMUNICATION ASSESSMENT     Skin Color/Characteristics  bruised (ecchymotic) 01/21/18 1248    Patient's communication style  spoken language (English or Bilingual) 01/13/18 0627   SAFETY      FINAL RESOURCES     Safety WDL  WDL 01/21/18 1248    PAS Number  [id number] 04/30/17 1139   All Alarms  alarm(s) activated and audible 01/21/18 1509    Senior Linkage Line Referral Placed  04/30/17 04/30/17 1139                      Assessment WDL (Within Defined Limits) Definitions           Safety WDL     Effective: 09/28/15    Row Information: <b>WDL Definition:</b> Bed in low position, wheels locked; call light in reach; upper side rails up x 2; ID band on<br> <font color=\"gray\"><i>Item=AS safety wdl>>List=AS safety wdl>>Version=F14</i></font>      Skin WDL     " "Effective: 09/28/15    Row Information: <b>WDL Definition:</b> Warm; dry; intact; elastic; without discoloration; pressure points without redness<br> <font color=\"gray\"><i>Item=AS skin wdl>>List=AS skin wdl>>Version=F14</i></font>      Vitals     Vital Signs Flowsheet     COMMENTS     Response to Interventions  Decrease in pain 01/19/18 1326    Comments  OT/CR: With activity  01/14/18 1028   ANALGESIA SIDE EFFECTS MONITORING      VITAL SIGNS     Side Effects Monitoring: Respiratory Quality  R 01/20/18 2117    Temp  96.6  F (35.9  C) 01/21/18 1543   Side Effects Monitoring: Respiratory Depth  N 01/20/18 2117    Temp src  Oral 01/21/18 1543   Side Effects Monitoring: Sedation Level  1 01/20/18 2117    Resp  18 01/21/18 1543   HEIGHT AND WEIGHT      Pulse  98 01/19/18 1937   Weight  64.4 kg (142 lb) 01/20/18 0451    Heart Rate  104 01/21/18 1543   Weight Method  Standing scale 01/19/18 0258    Pulse/Heart Rate Source  Monitor 01/21/18 1543   OLGA COMA SCALE      BP  93/64 01/21/18 1543   Best Eye Response  4-->(E4) spontaneous 01/19/18 2012    BP Location  Left arm 01/21/18 1543   Best Motor Response  6-->(M6) obeys commands 01/19/18 2012    OXYGEN THERAPY     Best Verbal Response  4-->(V4) confused 01/19/18 2012    SpO2  97 % 01/21/18 1543   Olga Coma Scale Score  14 01/19/18 2012    O2 Device  None (Room air) 01/21/18 1543   POSITIONING      Oxygen Delivery  1 LPM 01/20/18 1647   Body Position  independently positioning;side-lying, right 01/21/18 1248    PAIN/COMFORT     Head of Bed (HOB)  HOB at 20 degrees 01/21/18 1248    Patient Currently in Pain  denies 01/21/18 0104   Positioning/Transfer Devices  pillows;in use 01/21/18 0104    Preferred Pain Scale  number (Numeric Rating Pain Scale) 01/19/18 1326   Chair  Upright in chair 01/21/18 1509    Patient's Stated Pain Goal  2 01/19/18 1326   DAILY CARE      0-10 Pain Scale  0 01/19/18 1719   Activity Management  ambulated to bathroom 01/21/18 1448    Pain " Location  Generalized 01/19/18 1326   Activity Assistance Provided  assistance, 1 person 01/21/18 1448    Pain Orientation  Mid 01/18/18 0650   Assistive Device Utilized  gait belt;walker 01/21/18 0610    Pain Descriptors  Aching 01/19/18 1326   Additional Documentation  Activity Device Assistance (Row) 01/13/18 1629    Pain Intervention(s)  Medication (See eMAR) 01/19/18 1326                 Patient Lines/Drains/Airways Status    Active LINES/DRAINS/AIRWAYS     Name: Placement date: Placement time: Site: Days: Last dressing change:    Peripheral IV 01/17/18 Left Lower forearm 01/17/18   1121   Lower forearm   4     Wound 11/30/17 Right Eye Other (comment) [place] [facility]/Lower Right eye Laceration 11/30/17   0137   Eye   52     Wound 11/30/17 Anterior;Lower;Left Arm Skin tear scabbed 11/30/17   0400   Arm   52     Incision/Surgical Site 04/28/17 Left Head 04/28/17   1558    268             Patient Lines/Drains/Airways Status    Active PICC/CVC     None            Intake/Output Detail Report     Date Intake     Output Net    Shift P.O. I.V. IV Piggyback Total Urine Total       Day 01/20/18 0700 - 01/20/18 1459 125 -- -- 125 350 350 -225    Cally 01/20/18 1500 - 01/20/18 2259 120 -- -- 120 150 150 -30    Noc 01/20/18 2300 - 01/21/18 0659 240 -- -- 240 100 100 140    Day 01/21/18 0700 - 01/21/18 1459 120 -- -- 120 100 100 20    Cally 01/21/18 1500 - 01/21/18 2259 -- -- -- -- 200 200 -200      Last Void/BM       Most Recent Value    Urine Occurrence 1 at 01/21/2018 1500    Stool Occurrence 1 at 01/21/2018 1500      Case Management/Discharge Planning     Case Management/Discharge Planning Flowsheet     REFERRAL INFORMATION     Equipment Currently Used at Home  walker, standard;grab bar 01/14/18 1039    Arrived From  other (see comments[facility] ([facility] 04/27/17 1236   PAS Number  [id number] 04/30/17 1139    LIVING ENVIRONMENT     Senior Linkage Line Referral Placed  04/30/17 04/30/17 1139    Lives With  facility resident 01/14/18 1008    Referrals Placed  Emergent Admission to SNF/TCU 04/30/17 1139    Living Arrangements  assisted living 01/14/18 1008   ABUSE RISK SCREEN      COPING/STRESS     QUESTION TO PATIENT:  Has a member of your family or a partner(now or in the past) intimidated, hurt, manipulated, or controlled you in any way?  no 01/13/18 0630    Major Change/Loss/Stressor  hospitalization 01/16/18 0738   QUESTION TO PATIENT: Do you feel safe going back to the place where you are living?  yes 01/13/18 0630    EXPECTED DISCHARGE     OBSERVATION: Is there reason to believe there has been maltreatment of a vulnerable adult (ie. Physical/Sexual/Emotional abuse, self neglect, lack of adequate food, shelter, medical care, or financial exploitation)?  no 01/13/18 0630    Expected Discharge Date  01/21/18 (? back to memory care?) 01/19/18 1441   (R) MENTAL HEALTH SUICIDE RISK      FINAL RESOURCES     Are you depressed or being treated for depression?  No 01/16/18 0720

## 2018-01-13 NOTE — IP AVS SNAPSHOT
` ` Patient Information     Patient Name Sex     Olga Zelaya ([id number]) Female 1923       Room Bed    8832 8832-01      Patient Demographics     Address Phone    [address] [place] [address] [phone] (Home)  [phone] (Mobile) *Preferred*      Patient Ethnicity & Race     Ethnic Group Patient Race    American White      PCP and Center    Primary Care Provider  Phone Center     Juan Carlos Perez [phone] [facility] PHYSICIAN [facility] [address] [facility] [address] [place] *        Emergency Contact(s)     Name Relation Home Work Mobile    Linh eWlsh [phone]      Documents on File        Status Date Received Description       Documents for the Patient    Privacy Notice - [place] Received 12     Insurance Card Received 12     Patient ID Received 12     Consent for Services - Hospital/Clinic Received () 14     Insurance Card Received 12     Advance Directives and Living Will Received 12 Health Care Directive 03    Consent for EHR Access  13 Copied from existing Consent for services - IP/ED collected on 2012    [facility] Specified Other       Insurance Card Received 13 BCBS    Insurance Card Received 13 MEDICARE    Consent for EHR Access Received 13     External Medication Information Consent  17     External Medication Information Consent Accepted 13     Consent for Services - Hospital/Clinic Received () 13     Patient ID Received 17     Insurance Card Received 14     Insurance Card Received 14     Advance Directives and Living Will Received 14 POLST 2013-     Consent for Services/Privacy Notice - Hospital/Clinic Received 17     Insurance Card Received 17     Advance Directives and Living Will Received 17 POLST 2016    Advance Directives and Living Will Not Received  Validation of AD 2003    Care  Everywhere Prospective Auth Received 11/29/17     External Medication Information Consent Accepted (Deleted) 03/31/13        Documents for the Encounter    CMS IM for Patient Signature Received 01/13/18 1mm    EMS/Ambulance Record  01/13/18 [facility] MEDICAL TRANSPORTATION    CMS IM for Patient Signature Received 01/21/18   2MM      Admission Information     Attending Provider Admitting Provider Admission Type Admission Date/Time    Emily Crowell MD Tata, Sujatha, MD Emergency 01/13/18  0627    Discharge Date Hospital Service Auth/Cert Status Service Area     Hospitalist Incomplete [facility]    Unit Room/Bed Admission Status        88 ONCOLOGY 8832/8832-01 Admission (Confirmed)       Admission     Complaint    ACS (acute coronary syndrome) (H)      Hospital Account     Name Acct ID Class Status Primary Coverage    Olga Zelaya [id number] Inpatient Open MEDICARE - RR MEDICARE             Guarantor Account (for Hospital Account #[id number])     Name Relation to Pt Service Area Active? Acct Type    Olga Zelaya  FCS Yes Personal/Family    Address Phone          [address] [place] [address] [phone](H)              Coverage Information (for Hospital Account #[id number])     1. MEDICARE/RR MEDICARE      F/O Payor/Plan Precert #    MEDICARE/RR MEDICARE      Subscriber Subscriber #    Olga Zelaya [id number]    Address Phone    [address] [address]  [place] [id number] [phone]          2. [place]     F/O Payor/Plan Precert #    [place]     Subscriber Subscriber #    Olga Zelaya [id number]    Address Phone    [address] [address]  [place], [place] [id number] [phone]

## 2018-01-13 NOTE — PHARMACY-ADMISSION MEDICATION HISTORY
Admission medication history interview status for the 1/13/2018  admission is complete. See EPIC admission navigator for prior to admission medications     Medication history source reliability:Good    Actions taken by pharmacist (provider contacted, etc):Obtained MAR from patient's facility      Additional medication history information not noted on PTA med list :None    Medication reconciliation/reorder completed by provider prior to medication history? Yes    Time spent in this activity: 15 minutes     Prior to Admission medications    Medication Sig Last Dose Taking? Auth Provider   mineral oil enema Place 1 enema rectally once as needed for constipation  at prn Yes Unknown, Entered By History   calcium carbonate (TUMS) 500 MG chewable tablet Take 1 chew tab by mouth 4 times daily as needed for heartburn  at prn Yes Unknown, Entered By History   ASPIRIN PO Take 81 mg by mouth daily 1/12/2018 at am Yes Reported, Patient   NITROGLYCERIN SL Place 0.4 mg under the tongue every 5 minutes as needed for chest pain  at prn Yes Unknown, Entered By History   LEVOTHYROXINE SODIUM PO Take 88 mcg by mouth daily 1/12/2018 at am Yes Unknown, Entered By History   ASCORBIC ACID PO Take 1,000 mg by mouth daily 1/12/2018 at am Yes Unknown, Entered By History   ACETAMINOPHEN PO Take 1,000 mg by mouth 3 times daily 1/12/2018 at pm Yes Unknown, Entered By History   magnesium hydroxide (MILK OF MAGNESIA) 400 MG/5ML suspension Take 30 mLs by mouth daily as needed for constipation or heartburn  at prn Yes Unknown, Entered By History   sennosides (SENOKOT) 8.6 MG tablet Take 1 tablet by mouth daily as needed for constipation  at prn Yes Unknown, Entered By History   cholecalciferol (VITAMIN D) 1000 UNIT tablet Take 1 tablet by mouth daily. 1/12/2018 at am Yes Unknown, Entered By History

## 2018-01-13 NOTE — ED NOTES
[facility]  ED Nurse Handoff Report    ED Chief complaint: Chest Pain (Had CP at [facility].  Nitro was given and ASA was given en route.  States she has a little pain.  Very [place])      ED Diagnosis:   Final diagnoses:   Chest pain, unspecified type   Pleural effusion       Code Status: TBD - DNR/DNI as of 12/1/17    Allergies:   Allergies   Allergen Reactions     Biaxin [Clarithromycin]      Codeine      Nabumetone      Penicillins      Seafood      Sulfa Drugs        Activity level - Baseline/Home:  Stand with Assist-uses walker    Activity Level - Current:   Unable to Assess     Needed?: No    Isolation: No  Infection: Not Applicable    Bariatric?: No    Vital Signs:   Vitals:    01/13/18 0730 01/13/18 0906 01/13/18 0908 01/13/18 0909   BP: 123/89 117/66     Pulse:       Resp: 25   10   Temp:       TempSrc:       SpO2: 94% 94% 94% 94%       Cardiac Rhythm: ,        Pain level:      Is this patient confused?: Yes-lives in [facility]    Patient Report: Initial Complaint: Pt is a 89 year old female with a history of dementia, chronic chest pain, hyperlipidemia and DM who presents to the ED via EMS for evaluation of chest pain. The staff at the patient's [facility] care noticed that she was clutching her left chest and called EMS to bring her in for evaluation. En route, EMS gave her Duoneb because they thought she was diminished with grunted breathing.      Here in the ED, she is having difficulty breathing, but denies nausea, abdominal pain, light-headedness, or coughing.     Focused Assessment: RA sats in low 90's. Appears to become slightly SOB when speaking a lot. Denies SOB or CP. Pt very [place] and poor historian due to dementia.  Tests Performed: blood, chest CT  Abnormal Results: see EPIC  Treatments provided: 2L oxygen, Heparin being dosed by pharmacy    Family Comments: daughter at bedside    OBS brochure/video discussed/provided to patient: N/A    ED Medications:   Medications    iopamidol (ISOVUE-370) solution 59 mL (59 mLs Intravenous Given 1/13/18 0819)   Saline flush (85 mLs Intravenous Given 1/13/18 0819)       Drips infusing?:  Yes-Heparin if dosed before admission      ED NURSE PHONE NUMBER: [phone]

## 2018-01-13 NOTE — ED PROVIDER NOTES
History     Chief Complaint:  Chest pain    The history is provided by the EMS personnel. History limited by: the pateint's advanced dementia.      Olga Zelaya is a 89 year old female with a history of dementia, chronic chest pain, hyperlipidemia and DM who presents to the ED via EMS for evaluation of chest pain. The staff at the patient's memory care noticed that she was clutching her left chest and called EMS to bring her in for evaluation. En route, EMS gave her Duoneb because they thought she was diminished with grunted breathing.     Here in the ED, she is having difficulty breathing, but denies nausea, abdominal pain, light-headedness, or coughing.     Allergies:  Biaxin [Clarithromycin]  Codeine  Nabumetone  Penicillins  Seafood  Sulfa Drugs    Medications:    Tums  Aspirin  Nitroglycerin  Levothyroxine  Ascorbic acid  Magnesium hydroxide  Sennosides    Past Medical History:    Dementia   Diabetes mellitus (H)   High cholesterol   Hypothyroid     Past Surgical History:    Appendectomy  Biopsy artery temporal  Cholecystectomy    Family History:    DM    Social History:  Presents alone  Negative for tobacco use.  Negative for alcohol use.  Marital Status:   [5]    Review of Systems   Unable to perform ROS: Dementia   Respiratory: Positive for shortness of breath. Negative for cough.    Cardiovascular: Positive for chest pain.   Gastrointestinal: Negative for abdominal pain and nausea.   Neurological: Negative for light-headedness.     Physical Exam   First Vitals:  Patient Vitals for the past 24 hrs:   BP Temp Temp src Pulse Heart Rate Resp SpO2   01/13/18 0636 138/79 - - - 98 16 95 %   01/13/18 0634 (!) 149/99 97.7  F (36.5  C) Oral 102 - 18 97 %       Physical Exam  SKIN:  Warm, dry.  HEMATOLOGIC/IMMUNOLOGIC/LYMPHATIC:  No pallor.  No edema.  HENT:  No JVD.  EYES:  Conjunctivae normal.  CARDIOVASCULAR:  Regular rate and rhythm.  No murmur.  RESPIRATORY:  No respiratory distress, breath sounds  equal and normal.  GASTROINTESTINAL:  Soft, nontender abdomen with active bowel sounds.  No mass or distension.  MUSCULOSKELETAL:  Painless passive ROM of the limbs and torso.  Chest non-tender.  NEUROLOGIC:  Alert, impaired cognition and memory.  No gross motor or sensory deficit.  PSYCHIATRIC:  Unable.    Emergency Department Course   ECG:  Indication: Chest pain  Time: 0630  Vent. Rate 105 bpm. AR interval 186. QRS duration 110. QT/QTc 368/486. P-R-T axis 61 -72 71. Sinus tachycardia. Left anterior fascicular block. Septal infarct, age undetermined. Abnormal ECG. Read time: 0635.    Imaging:  Radiographic findings were communicated with the patient who voiced understanding of the findings.    CT Chest, IV contrast only:   IMPRESSION:  1. No acute thoracic aortic abnormality. No evidence for acute  pulmonary embolism.  2. New small bibasilar pleural effusions and bibasilar atelectasis.  3. Patchy nodular airspace opacities noted, dominantly seen at the  right lower lobe base, with a few other areas also identified.  Recommend follow-up CT chest in three months for this indeterminate  finding. Cannot exclude pneumonia, particularly at the right lung  base.  4. Several lymph nodes are mildly larger in the mediastinum. Recommend  further attention to these at follow-up imaging.  5. Cardiomegaly. As per radiology.    Laboratory:  0635 Troponin: 0.069 (H)  0635 D dimer: 1.9 (H)    CBC: WBC: 5.7, HGB: 11.9, PLT: 254  BMP: Glucose 127 (H), GFR 53 (L), o/w WNL (Creatinine: 0.98)    Intervention:  Heparin bolus and infusion IV    Emergency Department Course:  Nursing notes and vitals reviewed. 0628 I performed an exam of the patient as documented above. EKG obtained in the ED, see results above.     IV placed. Blood drawn. This was sent to the lab for further testing, results above.    The patient was sent for a Chest CT while in the emergency department, findings above.     0934 I rechecked the patient and discussed the  results of her workup thus far with the patient's daughter. She recently arrived in the ED.      0942  I consulted with Dr. Emily Crowell of the hospitalist services. They are in agreement to accept the patient for admission.    Findings and plan explained to the daughter who consents to admission. Discussed the patient with Dr. Crowell, who will admit the patient to a [facility] inpatient bed for further monitoring, evaluation, and treatment.    Impression & Plan    Medical Decision Making:  Olga Zelaya is a 89 year old female who's history is difficult to obtain due to her underlying dementia. From what we could gather, the patient suffered chest pain prior to arrival and EMS noted labored breathing as does the patient.  Testing revealed an elevated d dimer with respect to pulmonary embolism which prompted a CT chest. This revealed pleural effusions, but no pulmonary embolism.  Otherwise cardiac revealed a normal EKG but an elevated troponin.  Acute coronary syndrome is a possibility.  During her time in the ED the patient appeared to be comfortably resting. After discussion with the admitting hospitalist we opted to administer heparin with concern for ACS.    Diagnosis:    ICD-10-CM   1. Chest pain, unspecified type R07.9   2. Pleural effusion J90       Disposition:  Admitted to Dr. Emily Crowell of the hospitalist service.     I, Светлана Apple, am serving as a scribe on 1/13/2018 at 6:28 AM to personally document services performed by Srikanth Blankenship MD based on my observations and the provider's statements to me.       Светлана Apple  1/13/2018    EMERGENCY DEPARTMENT       Srikanth Blankenship MD  01/13/18 1546

## 2018-01-13 NOTE — ED NOTES
Pt back from CT. Placed back on monitors. Daughter at bedside. Updated on wait time for test results.

## 2018-01-13 NOTE — ED NOTES
Pt repositioned for comfort with EDT assist. Pt very [place]. Asks repetitively why she's in the ED and is asking for food. Informed pt she needed an additional test before eating.

## 2018-01-13 NOTE — IP AVS SNAPSHOT
MRN:[id number]                      After Visit Summary   1/13/2018    Olga Zelaya    MRN: [id number]           Thank you!     Thank you for choosing [facility] for your care. Our goal is always to provide you with excellent care. Hearing back from our patients is one way we can continue to improve our services. Please take a few minutes to complete the written survey that you may receive in the mail after you visit with us. Thank you!        Patient Information     Date Of Birth          8/23/1923        Designated Caregiver       Most Recent Value    Caregiver    Will someone help with your care after discharge? yes    Name of designated caregiver na [pt lives at nursing facility in memory unit]    Phone number of caregiver na    Caregiver address na      About your hospital stay     You were admitted on:  January 13, 2018 You last received care in the:  [place] [facility] [address] [facility]    You were discharged on:  January 21, 2018       Who to Call     For medical emergencies, please call 911.  For non-urgent questions about your medical care, please call your primary care provider or clinic, [phone]          Attending Provider     Provider Specialty    Srikanth Blankenship MD Emergency Medicine    Emily Crowell MD Internal Medicine       Primary Care Provider Office Phone # Fax #    Antione Perez [phone]      After Care Instructions     Activity       Your activity upon discharge: activity as tolerated            Diet       Follow this diet upon discharge:       Snacks/Supplements Adult: Ensure Plus (Adult); Between Meals      Combination Diet Low Saturated Fat Na <2400mg Diet                  Follow-up Appointments     Follow Up and recommended labs and tests       Follow up with primary care provider, ANTIONE PEREZ, within 7 days to evaluate medication change and for hospital follow- up.  Will need to have a  follow up CT as outpatient in 3 months for lung nodules  "pending goals of care. Will need to follow her BP on her new regimen. Monitor for resolution of her PNA.    - Cardiology follow up in one month.                  Pending Results     Date and Time Order Name Status Description    2018 1117 EKG 12-lead, tracing only Preliminary     2018 0336 EKG 12-lead, tracing only Preliminary             Statement of Approval     Ordered          18 1422  I have reviewed and agree with all the recommendations and orders detailed in this document.  EFFECTIVE NOW     Approved and electronically signed by:  Medhat Miller MD             Admission Information     Date & Time Provider Department Dept. Phone    2018 Emily Crowell MD [place] [address] [facility] [phone]      Your Vitals Were     Blood Pressure Pulse Temperature Respirations Weight Pulse Oximetry    93/64 (BP Location: Left arm) 98 96.6  F (35.9  C) (Oral) 18 64.4 kg (142 lb) 97%    BMI (Body Mass Index)                   22.92 kg/m2           [employer] Information     [employer] lets you send messages to your doctor, view your test results, renew your prescriptions, schedule appointments and more. To sign up, go to [email or web address] . Click on \"Log in\" on the left side of the screen, which will take you to the Welcome page. Then click on \"Sign up Now\" on the right side of the page.     You will be asked to enter the access code listed below, as well as some personal information. Please follow the directions to create your username and password.     Your access code is: [email or web address]  Expires: 3/1/2018  2:48 PM     Your access code will  in 90 days. If you need help or a new code, please call your [facility] clinic or [phone].        Care EveryWhere ID     This is your Care EveryWhere ID. This could be used by other organizations to access your [facility] medical records  [id number]        Equal Access to Services     STACI MELTON AH: barbara Alvarez " mayuri, ellievaughnandrew brennamary jiménez, luis salazaraan ah. So [facility] [phone].    ATENCIÓN: Si laura alicea, tiene a wade disposición servicios gratuitos de asistencia lingüística. Llame al [phone].    We comply with applicable federal civil rights laws and [place] laws. We do not discriminate on the basis of race, color, national origin, age, disability, sex, sexual orientation, or gender identity.               Review of your medicines      START taking        Dose / Directions    furosemide 20 MG tablet   Commonly known as:  LASIX   Used for:  Essential hypertension        Dose:  10 mg   Start taking on:  1/22/2018   Take 0.5 tablets (10 mg) by mouth daily   Quantity:  15 tablet   Refills:  3       guaiFENesin-dextromethorphan 100-10 MG/5ML syrup   Commonly known as:  ROBITUSSIN DM   Used for:  Cough        Dose:  5 mL   Take 5 mLs by mouth every 4 hours as needed for cough   Quantity:  560 mL   Refills:  0       isosorbide mononitrate 30 MG 24 hr tablet   Commonly known as:  IMDUR   Used for:  Essential hypertension        Dose:  15 mg   Take 0.5 tablets (15 mg) by mouth 2 times daily   Quantity:  30 tablet   Refills:  3       lisinopril 2.5 MG tablet   Commonly known as:  PRINIVIL/Zestril        Dose:  2.5 mg   Start taking on:  1/22/2018   Take 1 tablet (2.5 mg) by mouth daily   Quantity:  30 tablet   Refills:  3       metoprolol tartrate 25 MG tablet   Commonly known as:  LOPRESSOR        Dose:  6.25 mg   Take 0.25 tablets (6.25 mg) by mouth 2 times daily   Quantity:  15 tablet   Refills:  3       omeprazole 40 MG capsule   Commonly known as:  priLOSEC   Used for:  Cough        Dose:  40 mg   Start taking on:  1/22/2018   Take 1 capsule (40 mg) by mouth every morning (before breakfast)   Quantity:  30 capsule   Refills:  3       pravastatin 40 MG tablet   Commonly known as:  PRAVACHOL        Dose:  40 mg   Take 1 tablet (40 mg) by mouth every evening   Quantity:  30 tablet    Refills:  3         CONTINUE these medicines which have NOT CHANGED        Dose / Directions    ACETAMINOPHEN PO        Dose:  1000 mg   Take 1,000 mg by mouth 3 times daily   Refills:  0       ASCORBIC ACID PO        Dose:  1000 mg   Take 1,000 mg by mouth daily   Refills:  0       ASPIRIN PO        Dose:  81 mg   Take 81 mg by mouth daily   Refills:  0       calcium carbonate 500 MG chewable tablet   Commonly known as:  TUMS        Dose:  1 chew tab   Take 1 chew tab by mouth 4 times daily as needed for heartburn   Refills:  0       cholecalciferol 1000 UNIT tablet   Commonly known as:  vitamin D3        Dose:  1 tablet   Take 1 tablet by mouth daily.   Refills:  0       LEVOTHYROXINE SODIUM PO        Dose:  88 mcg   Take 88 mcg by mouth daily   Refills:  0       magnesium hydroxide 400 MG/5ML suspension   Commonly known as:  MILK OF MAGNESIA        Dose:  30 mL   Take 30 mLs by mouth daily as needed for constipation or heartburn   Refills:  0       mineral oil enema        Dose:  1 enema   Place 1 enema rectally once as needed for constipation   Refills:  0       NITROGLYCERIN SL        Dose:  0.4 mg   Place 0.4 mg under the tongue every 5 minutes as needed for chest pain   Refills:  0       sennosides 8.6 MG tablet   Commonly known as:  SENOKOT        Dose:  1 tablet   Take 1 tablet by mouth daily as needed for constipation   Refills:  0            Where to get your medicines      These medications were sent to [facility] - [address]  [address] [facility] [address], [place] [address]     Phone:  [phone]     furosemide 20 MG tablet    guaiFENesin-dextromethorphan 100-10 MG/5ML syrup    isosorbide mononitrate 30 MG 24 hr tablet    lisinopril 2.5 MG tablet    metoprolol tartrate 25 MG tablet    omeprazole 40 MG capsule    pravastatin 40 MG tablet                Protect others around you: Learn how to safely use, store and throw away your medicines at www.disposemymeds.org.              Medication List: This is a list of all your medications and when to take them. Check marks below indicate your daily home schedule. Keep this list as a reference.      Medications           Morning Afternoon Evening Bedtime As Needed    ACETAMINOPHEN PO   Take 1,000 mg by mouth 3 times daily   Last time this was given:  1,000 mg on 1/21/2018  8:45 AM                                         ASCORBIC ACID PO   Take 1,000 mg by mouth daily   Next Dose Due:  Resume upon discharge                                    ASPIRIN PO   Take 81 mg by mouth daily   Last time this was given:  81 mg on 1/21/2018  8:45 AM                                   calcium carbonate 500 MG chewable tablet   Commonly known as:  TUMS   Take 1 chew tab by mouth 4 times daily as needed for heartburn                                   cholecalciferol 1000 UNIT tablet   Commonly known as:  vitamin D3   Take 1 tablet by mouth daily.                                   furosemide 20 MG tablet   Commonly known as:  LASIX   Take 0.5 tablets (10 mg) by mouth daily   Start taking on:  1/22/2018   Last time this was given:  10 mg on 1/21/2018  8:45 AM                                   guaiFENesin-dextromethorphan 100-10 MG/5ML syrup   Commonly known as:  ROBITUSSIN DM   Take 5 mLs by mouth every 4 hours as needed for cough                                   isosorbide mononitrate 30 MG 24 hr tablet   Commonly known as:  IMDUR   Take 0.5 tablets (15 mg) by mouth 2 times daily   Last time this was given:  15 mg on 1/21/2018  8:45 AM                                   LEVOTHYROXINE SODIUM PO   Take 88 mcg by mouth daily   Last time this was given:  88 mcg on 1/21/2018  8:44 AM                                   lisinopril 2.5 MG tablet   Commonly known as:  PRINIVIL/Zestril   Take 1 tablet (2.5 mg) by mouth daily   Start taking on:  1/22/2018   Last time this was given:  2.5 mg on 1/21/2018  8:44 AM                                   magnesium hydroxide 400  MG/5ML suspension   Commonly known as:  MILK OF MAGNESIA   Take 30 mLs by mouth daily as needed for constipation or heartburn                                   metoprolol tartrate 25 MG tablet   Commonly known as:  LOPRESSOR   Take 0.25 tablets (6.25 mg) by mouth 2 times daily   Last time this was given:  6.25 mg on 1/21/2018  8:45 AM                                      mineral oil enema   Place 1 enema rectally once as needed for constipation                                   NITROGLYCERIN SL   Place 0.4 mg under the tongue every 5 minutes as needed for chest pain   Last time this was given:  0.4 mg on 1/18/2018  6:30 AM                                   omeprazole 40 MG capsule   Commonly known as:  priLOSEC   Take 1 capsule (40 mg) by mouth every morning (before breakfast)   Start taking on:  1/22/2018   Last time this was given:  40 mg on 1/21/2018  8:31 AM                                   pravastatin 40 MG tablet   Commonly known as:  PRAVACHOL   Take 1 tablet (40 mg) by mouth every evening   Last time this was given:  40 mg on 1/20/2018  9:23 PM                                   sennosides 8.6 MG tablet   Commonly known as:  SENOKOT   Take 1 tablet by mouth daily as needed for constipation

## 2018-01-13 NOTE — IP AVS SNAPSHOT
[place] [address] ONCOLOGY: [phone]                                              INTERAGENCY TRANSFER FORM - LAB / IMAGING / EKG / EMG RESULTS   2018                    Hospital Admission Date: 2018  BE MACEDO   : 1923  Sex: Female        Attending Provider: Emily Crowell MD     Allergies:  Biaxin [Clarithromycin], Codeine, Nabumetone, Penicillins, Seafood, Sulfa Drugs    Infection:  None   Service:  HOSPITALIST    Ht:  --   Wt:  64.4 kg (142 lb)   Admission Wt:  64.2 kg (141 lb 9.6 oz)    BMI:  --   BSA:  --            Patient PCP Information     Provider PCP Type    ANTIONE LIMON General         Lab Results - 3 Days      Basic metabolic panel [[id number]] (Abnormal)  Resulted: 18 1356, Result status: Final result    Ordering provider: Medhat Miller MD  18 09 Resulting lab: [facility]    Specimen Information    Type Source Collected On   Blood  18 1320          Components       Value Reference Range Flag Lab   Sodium 136 133 - 144 mmol/L  FrStHsLb   Potassium 4.6 3.4 - 5.3 mmol/L  FrStHsLb   Chloride 96 94 - 109 mmol/L  FrStHsLb   Carbon Dioxide 34 20 - 32 mmol/L H FrStHsLb   Anion Gap 6 3 - 14 mmol/L  FrStHsLb   Glucose 135 70 - 99 mg/dL H FrStHsLb   Urea Nitrogen 25 7 - 30 mg/dL  FrStHsLb   Creatinine 1.03 0.52 - 1.04 mg/dL  FrStHsLb   GFR Estimate 50 >60 mL/min/1.7m2 L FrStHsLb   Comment:  Non  GFR Calc   GFR Estimate If Black 60 >60 mL/min/1.7m2 L FrStHsLb   Comment:  African American GFR Calc   Calcium 8.8 8.5 - 10.1 mg/dL  FrStHsLb            Lactic acid level STAT [[id number]]  Resulted: 18 0945, Result status: Final result    Ordering provider: Owen Melara MD  18 0906 Resulting lab: [facility]    Specimen Information    Type Source Collected On   Blood  18 0918          Components       Value Reference Range Flag Lab   Lactic Acid 1.0 0.7 - 2.0 mmol/L  FrStHsLb             Basic metabolic panel [[id number]] (Abnormal)  Resulted: 01/19/18 0555, Result status: Final result    Ordering provider: Owen Melara MD  01/19/18 0000 Resulting lab: [facility]    Specimen Information    Type Source Collected On   Blood  01/19/18 0515          Components       Value Reference Range Flag Lab   Sodium 136 133 - 144 mmol/L  FrStHsLb   Potassium 4.3 3.4 - 5.3 mmol/L  FrStHsLb   Chloride 99 94 - 109 mmol/L  FrStHsLb   Carbon Dioxide 30 20 - 32 mmol/L  FrStHsLb   Anion Gap 7 3 - 14 mmol/L  FrStHsLb   Glucose 104 70 - 99 mg/dL H FrStHsLb   Urea Nitrogen 32 7 - 30 mg/dL H FrStHsLb   Creatinine 1.23 0.52 - 1.04 mg/dL H FrStHsLb   GFR Estimate 41 >60 mL/min/1.7m2 L FrStHsLb   Comment:  Non  GFR Calc   GFR Estimate If Black 49 >60 mL/min/1.7m2 L FrStHsLb   Comment:  African American GFR Calc   Calcium 8.6 8.5 - 10.1 mg/dL  FrStHsLb            Procalcitonin [[id number]]  Resulted: 01/18/18 0945, Result status: Final result    Ordering provider: Owen Melara MD  01/18/18 0550 Resulting lab: [facility]    Specimen Information    Type Source Collected On     01/18/18 0550          Components       Value Reference Range Flag Lab   Procalcitonin 0.15 ng/ml  FrStHsLb   Comment:         0.05-0.24 ng/ml Low risk of systemic bacterial infection. Local bacterial   infection possible.  Recommendation: Assess other clinical features of   infection. Discourage antibiotics unless strong clinical suspicion for serious   infection.              Troponin I [[id number]]  Resulted: 01/18/18 0917, Result status: Final result    Ordering provider: Owen Melara MD  01/18/18 0550 Resulting lab: [facility]    Specimen Information    Type Source Collected On     01/18/18 0550          Components       Value Reference Range Flag Lab   Troponin I ES 0.038 0.000 - 0.045 ug/L  FrStHsLb   Comment:         The 99th percentile for upper reference range is 0.045 ug/L.   Troponin values   in the range of 0.045 - 0.120 ug/L may be associated with risks of adverse   clinical events.              Lactic acid level STAT [[id number]]  Resulted: 01/18/18 0646, Result status: Final result    Ordering provider: Owen Melara MD  01/18/18 0615 Resulting lab: [facility]    Specimen Information    Type Source Collected On   Blood  01/18/18 0630          Components       Value Reference Range Flag Lab   Lactic Acid 0.7 0.7 - 2.0 mmol/L  FrStHsLb            Basic metabolic panel [[id number]] (Abnormal)  Resulted: 01/18/18 0645, Result status: Final result    Ordering provider: Owen Melara MD  01/18/18 0000 Resulting lab: [facility]    Specimen Information    Type Source Collected On   Blood  01/18/18 0550          Components       Value Reference Range Flag Lab   Sodium 134 133 - 144 mmol/L  FrStHsLb   Potassium 4.4 3.4 - 5.3 mmol/L  FrStHsLb   Chloride 97 94 - 109 mmol/L  FrStHsLb   Carbon Dioxide 31 20 - 32 mmol/L  FrStHsLb   Anion Gap 6 3 - 14 mmol/L  FrStHsLb   Glucose 114 70 - 99 mg/dL H FrStHsLb   Urea Nitrogen 28 7 - 30 mg/dL  FrStHsLb   Creatinine 1.26 0.52 - 1.04 mg/dL H FrStHsLb   GFR Estimate 40 >60 mL/min/1.7m2 L FrStHsLb   Comment:  Non  GFR Calc   GFR Estimate If Black 48 >60 mL/min/1.7m2 L FrStHsLb   Comment:  African American GFR Calc   Calcium 8.4 8.5 - 10.1 mg/dL L FrStHsLb            CBC with platelets [[id number]] (Abnormal)  Resulted: 01/18/18 0625, Result status: Final result    Ordering provider: Owen Melara MD  01/18/18 0000 Resulting lab: [facility]    Specimen Information    Type Source Collected On   Blood  01/18/18 0550          Components       Value Reference Range Flag Lab   WBC 4.1 4.0 - 11.0 10e9/L  FrStHsLb   RBC Count 3.40 3.8 - 5.2 10e12/L L FrStHsLb   Hemoglobin 10.4 11.7 - 15.7 g/dL L FrStHsLb   Hematocrit 32.1 35.0 - 47.0 % L FrStHsLb   MCV 94 78 - 100 fl  FrStHsLb   MCH 30.6 26.5 -  33.0 pg  FrStHsLb   MCHC 32.4 31.5 - 36.5 g/dL  FrStHsLb   RDW 13.8 10.0 - 15.0 %  FrStHsLb   Platelet Count 198 150 - 450 10e9/L  FrStHsLb            Testing Performed By     Lab - Abbreviation Name Director Address Valid Date Range    14 - FrStHsLb [facility] Unknown [address] [place] [address] 05/08/15 1057 - Present            Unresulted Labs     None         Imaging Results - 3 Days      XR Chest 2 Views [[id number]]  Resulted: 01/18/18 1159, Result status: Final result    Ordering provider: Owen Melara MD  01/18/18 0718 Resulted by: Minerva Moore MD    Performed: 01/18/18 0755 - 01/18/18 0802 Resulting lab: RADI[facility]Y RESULTS    Narrative:       CHEST TWO VIEWS  1/18/2018 8:02 AM     HISTORY:  Dyspnea.     COMPARISON: Chest CT dated 1/13/2018, chest x-rays dated 4/28/2017.    FINDINGS:  Small left and small to moderate-sized right pleural fluid  collections are noted. Hyperaeration is again seen. Increased  interstitial markings in the right lung as compared to the left are  concerning for pulmonary infiltrate. Superimposed asymmetric pulmonary  edema is also possible. Chronic silhouette is enlarged. No  pneumothorax is identified.      Impression:       IMPRESSION:  1. Findings concerning for right mid and lower lung pulmonary  infiltrates versus asymmetric pulmonary edema.  2. Cardiomegaly and bilateral pleural fluid collections could  represent underlying congestive heart failure.  3. Hyperaeration is again noted.     MINERVA MOORE MD      Testing Performed By     Lab - Abbreviation Name Director Address Valid Date Range    104 - Rad Rslts RADIOLOGY RESULTS Unknown Unknown 02/16/05 1553 - Present               ECG/EMG Results      ECHO COMPLETE WITH OPTISON [[id number]]  Resulted: 01/14/18 0912, Result status: Edited Result - FINAL    Ordering provider: Emily Crowell MD  01/13/18 1147 Resulted by: Aydin Sage MD    Performed: 01/14/18 0952 - 01/14/18 0954 Resulting  lab: RADIOLOGY RESULTS    Narrative:       [id number]  [id number]  [id number]  [id number]^CANDIE^FRITZ^           [facility]  Echocardiography Laboratory  [address] [place] [address]        Name: BE MACEDO  MRN: [id number]  : 1923  Study Date: 2018 09:12 AM  Age: 89 yrs  Gender: Female  Patient Location: [facility]  Reason For Study: Chest Pain  Ordering Physician: FRITZ SCHREIBER  Referring Physician: Juan Carlos Perez  Performed By: Serenity Juarez JUAN     BSA: 1.7 m2  Height: 65 in  Weight: 140 lb  HR: 94  BP: 117/60 mmHg  _____________________________________________________________________________  __        Procedure  Complete Portable Echo Adult. Contrast Optison.  _____________________________________________________________________________  __        Interpretation Summary     The left ventricle is normal in size.  The visual ejection fraction is estimated at 25-30%.  E by E prime ratio is greater than 15, that likely suggests increased left  ventricular filling pressures.  Distal anteroapical hypokinesis, distal inferolateral hypokinesis.  Mild to moderate valvular aortic stenosis.  _____________________________________________________________________________  __        Left Ventricle  The left ventricle is normal in size. There is borderline concentric left  ventricular hypertrophy. The visual ejection fraction is estimated at 25-30%.  E by E prime ratio is greater than 15, that likely suggests increased left  ventricular filling pressures. Distal anteroapical hypokinesis, distal  inferolateral hypokinesis.     Right Ventricle  The right ventricle is moderately dilated. Mildly decreased right ventricular  systolic function.     Atria  The left atrium is severely dilated. Right atrial size is normal. There is no  color Doppler evidence of an atrial shunt.     Mitral Valve  The mitral valve leaflets are mildly thickened. There is moderate mitral  annular calcification. There  is mild (1+) mitral regurgitation.        Tricuspid Valve  There is mild (1+) tricuspid regurgitation. The right ventricular systolic  pressure is approximated at 33.3 mmHg plus the right atrial pressure. Normal  IVC (1.5-2.5cm) with <50% respiratory collapse; right atrial pressure is  estimated at 10-15mmHg.     Aortic Valve  The aortic valve is trileaflet. No aortic regurgitation is present. The mean  AoV pressure gradient is 14.2 mmHg. Mild to moderate valvular aortic stenosis.     Pulmonic Valve  There is trace pulmonic valvular regurgitation.     Vessels  Normal size aorta. The aortic root is normal size.     Pericardium  Small pericardial effusion.        Rhythm  Possible 1st degree AV block. The rhythm was sinus with wide QRS.  _____________________________________________________________________________  __  MMode/2D Measurements & Calculations  IVSd: 1.2 cm     LVIDd: 5.0 cm  LVIDs: 3.5 cm  LVPWd: 1.3 cm  FS: 30.6 %  EDV(Teich): 118.2 ml  ESV(Teich): 49.8 ml  LV mass(C)d: 247.2 grams  LV mass(C)dI: 145.4 grams/m2  Ao root diam: 3.1 cm  LA dimension: 4.3 cm  asc Aorta Diam: 3.3 cm  LA/Ao: 1.4  LVOT diam: 2.2 cm  LVOT area: 3.7 cm2  LA Volume (BP): 108.0 ml  LA Volume Index (BP): 63.5 ml/m2  RWT: 0.51           Doppler Measurements & Calculations  MV E max quynh: 91.7 cm/sec  MV A max quynh: 49.1 cm/sec  MV E/A: 1.9  MV dec time: 0.18 sec  Ao V2 max: 286.4 cm/sec  Ao max P.8 mmHg  Ao V2 mean: 162.5 cm/sec  Ao mean P.2 mmHg  Ao V2 VTI: 60.1 cm  ELAINE(I,D): 0.91 cm2  ELAINE(V,D): 1.0 cm2  LV V1 max P.5 mmHg  LV V1 max: 78.8 cm/sec  LV V1 VTI: 14.8 cm  SV(LVOT): 54.7 ml  SI(LVOT): 32.2 ml/m2  PA V2 max: 92.1 cm/sec  PA max PG: 3.4 mmHg  PI end-d quynh: 118.2 cm/sec  TR max quynh: 288.4 cm/sec  TR max P.3 mmHg  ELAINE Index (cm2/m2): 0.54  Medial E/e': 17.5              _____________________________________________________________________________  __        Report approved by: Sumeet Fox 01/15/2018  10:34 AM       1    Type Source Collected On     01/14/18 0912          View Image (below)        Echocardiogram Complete [[id number]]  Resulted: 01/14/18 0953, Result status: In process    Ordering provider: Emily Crowell MD  01/13/18 1147 Performed: 01/14/18 0952 - 01/14/18 0952    Resulting lab: [facility]                   Encounter-Level Documents:     There are no encounter-level documents.      Order-Level Documents:     There are no order-level documents.

## 2018-01-13 NOTE — IP AVS SNAPSHOT
[place] [address] ONCOLOGY: [phone]                                              INTERAGENCY TRANSFER FORM - PHYSICIAN ORDERS   2018                    Hospital Admission Date: 2018  BE MACEDO   : 1923  Sex: Female        Attending Provider: Emily Crowell MD     Allergies:  Biaxin [Clarithromycin], Codeine, Nabumetone, Penicillins, Seafood, Sulfa Drugs    Infection:  None   Service:  HOSPITALIST    Ht:  --   Wt:  64.4 kg (142 lb)   Admission Wt:  64.2 kg (141 lb 9.6 oz)    BMI:  --   BSA:  --            Patient PCP Information     Provider PCP Type    ANTIONE LIMON General      ED Clinical Impression     Diagnosis Description Comment Added By Time Added    Chest pain, unspecified type [R07.9] Chest pain, unspecified type [R07.9]  Srikanth Blankenship MD 2018  9:35 AM    Pleural effusion [J90] Pleural effusion [J90]  Srikanth Blankenship MD 2018  9:36 AM      Hospital Problems as of 2018              Priority Class Noted POA    ACS (acute coronary syndrome) (H) Medium  2018 Yes      Non-Hospital Problems as of 2018              Priority Class Noted    Left elbow fracture Medium  2012    Type 2 diabetes, HbA1c goal < 7% (H) Medium  2012    HTN (hypertension) Medium  2012    Hypothyroidism Medium  2012    Hyperlipidemia LDL goal <100 Medium  2012    Vision loss of left eye Medium  2017    Vision loss Medium  2017    Advance Care Planning Medium  2017    Fall Medium  2017    Orbital wall fracture (H) Medium  2017      Code Status History     Date Active Date Inactive Code Status Order ID Comments User Context    2018  2:21 PM  DNR/DNI [id number]  Medhat Miller MD Outpatient    2018 11:47 AM 2018  2:21 PM DNR/DNI [id number]  Emily Crowell MD Inpatient    2017 11:05 AM 2018 11:47 AM DNR/DNI [id number]  Becka Fulton APRN CNP Outpatient    2017  1:47 AM 2017  11:05 AM DNR/DNI [id number]  Sarita Bronson MD Inpatient    4/30/2017 10:42 AM 11/30/2017  1:47 AM DNR/DNI [id number]  Teodoro Leonard MD Outpatient    4/26/2017  9:41 PM 4/30/2017 10:42 AM DNR/DNI [id number]  Patricio Ho MD Inpatient    2/20/2012 12:08 AM 2/20/2012  5:16 PM Full Code [id number]  Gareth Blanchard MD Inpatient         Medication Review      START taking        Dose / Directions Comments    furosemide 20 MG tablet   Commonly known as:  LASIX   Used for:  Essential hypertension        Dose:  10 mg   Start taking on:  1/22/2018   Take 0.5 tablets (10 mg) by mouth daily   Quantity:  15 tablet   Refills:  3    Hold for sbp < 100       guaiFENesin-dextromethorphan 100-10 MG/5ML syrup   Commonly known as:  ROBITUSSIN DM   Used for:  Cough        Dose:  5 mL   Take 5 mLs by mouth every 4 hours as needed for cough   Quantity:  560 mL   Refills:  0        isosorbide mononitrate 30 MG 24 hr tablet   Commonly known as:  IMDUR   Used for:  Essential hypertension        Dose:  15 mg   Take 0.5 tablets (15 mg) by mouth 2 times daily   Quantity:  30 tablet   Refills:  3    Hold for sbp < 100       lisinopril 2.5 MG tablet   Commonly known as:  PRINIVIL/Zestril        Dose:  2.5 mg   Start taking on:  1/22/2018   Take 1 tablet (2.5 mg) by mouth daily   Quantity:  30 tablet   Refills:  3    Hold for sbp < 100 or Hr < 60       metoprolol tartrate 25 MG tablet   Commonly known as:  LOPRESSOR        Dose:  6.25 mg   Take 0.25 tablets (6.25 mg) by mouth 2 times daily   Quantity:  15 tablet   Refills:  3    Hold for sbp , 100 or HR < 60       omeprazole 40 MG capsule   Commonly known as:  priLOSEC   Used for:  Cough        Dose:  40 mg   Start taking on:  1/22/2018   Take 1 capsule (40 mg) by mouth every morning (before breakfast)   Quantity:  30 capsule   Refills:  3        pravastatin 40 MG tablet   Commonly known as:  PRAVACHOL        Dose:  40 mg   Take 1 tablet (40 mg) by mouth every evening   Quantity:  30 tablet    Refills:  3          CONTINUE these medications which have NOT CHANGED        Dose / Directions Comments    ACETAMINOPHEN PO        Dose:  1000 mg   Take 1,000 mg by mouth 3 times daily   Refills:  0        ASCORBIC ACID PO        Dose:  1000 mg   Take 1,000 mg by mouth daily   Refills:  0        ASPIRIN PO        Dose:  81 mg   Take 81 mg by mouth daily   Refills:  0        calcium carbonate 500 MG chewable tablet   Commonly known as:  TUMS        Dose:  1 chew tab   Take 1 chew tab by mouth 4 times daily as needed for heartburn   Refills:  0        cholecalciferol 1000 UNIT tablet   Commonly known as:  vitamin D3        Dose:  1 tablet   Take 1 tablet by mouth daily.   Refills:  0        LEVOTHYROXINE SODIUM PO        Dose:  88 mcg   Take 88 mcg by mouth daily   Refills:  0        magnesium hydroxide 400 MG/5ML suspension   Commonly known as:  MILK OF MAGNESIA        Dose:  30 mL   Take 30 mLs by mouth daily as needed for constipation or heartburn   Refills:  0        mineral oil enema        Dose:  1 enema   Place 1 enema rectally once as needed for constipation   Refills:  0        NITROGLYCERIN SL        Dose:  0.4 mg   Place 0.4 mg under the tongue every 5 minutes as needed for chest pain   Refills:  0        sennosides 8.6 MG tablet   Commonly known as:  SENOKOT        Dose:  1 tablet   Take 1 tablet by mouth daily as needed for constipation   Refills:  0                After Care     Activity       Your activity upon discharge: activity as tolerated       Diet       Follow this diet upon discharge:       Snacks/Supplements Adult: Ensure Plus (Adult); Between Meals      Combination Diet Low Saturated Fat Na <2400mg Diet             Follow-Up Appointment Instructions     Future Labs/Procedures    Follow Up and recommended labs and tests     Comments:    Follow up with primary care provider, ANTIONE LIMON, within 7 days to evaluate medication change and for hospital follow- up.  Will need to have a  follow up  CT as outpatient in 3 months for lung nodules pending goals of care. Will need to follow her BP on her new regimen. Monitor for resolution of her PNA.    - Cardiology follow up in one month.      Follow-Up Appointment Instructions     Follow Up and recommended labs and tests       Follow up with primary care provider, ANTIONE LIMON, within 7 days to evaluate medication change and for hospital follow- up.  Will need to have a  follow up CT as outpatient in 3 months for lung nodules pending goals of care. Will need to follow her BP on her new regimen. Monitor for resolution of her PNA.    - Cardiology follow up in one month.             Statement of Approval     Ordered          01/21/18 1422  I have reviewed and agree with all the recommendations and orders detailed in this document.  EFFECTIVE NOW     Approved and electronically signed by:  Medhat Miller MD

## 2018-01-13 NOTE — PROVIDER NOTIFICATION
MD Notification    Notified Person:  MD    Notified Persons Name: Dr. Crowell    Notification Date/Time: 01/13/18 @ 4:00 PM    Notification Interaction:  Talked with Physician    Purpose of Notification: Pt has history of diet controlled DM, do you want blood glucose checks?    Orders Received: DM is diet controlled, no blood glucose checks at this time.    Comments:

## 2018-01-14 ENCOUNTER — APPOINTMENT (OUTPATIENT)
Dept: CARDIOLOGY | Facility: CLINIC | Age: 83
DRG: 280 | End: 2018-01-14
Attending: INTERNAL MEDICINE
Payer: MEDICARE

## 2018-01-14 ENCOUNTER — APPOINTMENT (OUTPATIENT)
Dept: OCCUPATIONAL THERAPY | Facility: CLINIC | Age: 83
DRG: 280 | End: 2018-01-14
Attending: INTERNAL MEDICINE
Payer: MEDICARE

## 2018-01-14 LAB
ANION GAP SERPL CALCULATED.3IONS-SCNC: 8 MMOL/L (ref 3–14)
BUN SERPL-MCNC: 21 MG/DL (ref 7–30)
CALCIUM SERPL-MCNC: 8.8 MG/DL (ref 8.5–10.1)
CHLORIDE SERPL-SCNC: 101 MMOL/L (ref 94–109)
CHOLEST SERPL-MCNC: 176 MG/DL
CO2 SERPL-SCNC: 28 MMOL/L (ref 20–32)
CREAT SERPL-MCNC: 1.03 MG/DL (ref 0.52–1.04)
GFR SERPL CREATININE-BSD FRML MDRD: 50 ML/MIN/1.7M2
GLUCOSE SERPL-MCNC: 108 MG/DL (ref 70–99)
HDLC SERPL-MCNC: 61 MG/DL
LDLC SERPL CALC-MCNC: 95 MG/DL
LMWH PPP CHRO-ACNC: 0.21 IU/ML
LMWH PPP CHRO-ACNC: 0.23 IU/ML
LMWH PPP CHRO-ACNC: 0.45 IU/ML
NONHDLC SERPL-MCNC: 115 MG/DL
POTASSIUM SERPL-SCNC: 4 MMOL/L (ref 3.4–5.3)
SODIUM SERPL-SCNC: 137 MMOL/L (ref 133–144)
TRIGL SERPL-MCNC: 99 MG/DL
TROPONIN I SERPL-MCNC: 0.06 UG/L (ref 0–0.04)

## 2018-01-14 PROCEDURE — 93306 TTE W/DOPPLER COMPLETE: CPT | Mod: 26 | Performed by: INTERNAL MEDICINE

## 2018-01-14 PROCEDURE — 99233 SBSQ HOSP IP/OBS HIGH 50: CPT | Performed by: INTERNAL MEDICINE

## 2018-01-14 PROCEDURE — 85520 HEPARIN ASSAY: CPT | Performed by: INTERNAL MEDICINE

## 2018-01-14 PROCEDURE — 25000132 ZZH RX MED GY IP 250 OP 250 PS 637: Mod: GY | Performed by: INTERNAL MEDICINE

## 2018-01-14 PROCEDURE — 97535 SELF CARE MNGMENT TRAINING: CPT | Mod: GO

## 2018-01-14 PROCEDURE — 40000133 ZZH STATISTIC OT WARD VISIT

## 2018-01-14 PROCEDURE — 21000001 ZZH R&B HEART CARE

## 2018-01-14 PROCEDURE — 99207 ZZC CDG-MDM COMPONENT: MEETS MODERATE - UP CODED: CPT | Performed by: INTERNAL MEDICINE

## 2018-01-14 PROCEDURE — A9270 NON-COVERED ITEM OR SERVICE: HCPCS | Mod: GY | Performed by: INTERNAL MEDICINE

## 2018-01-14 PROCEDURE — 93306 TTE W/DOPPLER COMPLETE: CPT

## 2018-01-14 PROCEDURE — 80048 BASIC METABOLIC PNL TOTAL CA: CPT | Performed by: INTERNAL MEDICINE

## 2018-01-14 PROCEDURE — 97110 THERAPEUTIC EXERCISES: CPT | Mod: GO

## 2018-01-14 PROCEDURE — 36415 COLL VENOUS BLD VENIPUNCTURE: CPT | Performed by: INTERNAL MEDICINE

## 2018-01-14 PROCEDURE — 25500064 ZZH RX 255 OP 636: Performed by: INTERNAL MEDICINE

## 2018-01-14 PROCEDURE — 80061 LIPID PANEL: CPT | Performed by: INTERNAL MEDICINE

## 2018-01-14 PROCEDURE — 97165 OT EVAL LOW COMPLEX 30 MIN: CPT | Mod: GO

## 2018-01-14 RX ORDER — ACYCLOVIR 200 MG/1
30 CAPSULE ORAL ONCE
Status: DISCONTINUED | OUTPATIENT
Start: 2018-01-14 | End: 2018-01-14

## 2018-01-14 RX ORDER — LEVOFLOXACIN 250 MG/1
250 TABLET, FILM COATED ORAL DAILY
Status: DISCONTINUED | OUTPATIENT
Start: 2018-01-14 | End: 2018-01-18

## 2018-01-14 RX ORDER — FUROSEMIDE 20 MG
20 TABLET ORAL DAILY
Status: DISCONTINUED | OUTPATIENT
Start: 2018-01-14 | End: 2018-01-16

## 2018-01-14 RX ADMIN — HUMAN ALBUMIN MICROSPHERES AND PERFLUTREN 7 ML: 10; .22 INJECTION, SOLUTION INTRAVENOUS at 10:00

## 2018-01-14 RX ADMIN — ASPIRIN 81 MG 81 MG: 81 TABLET ORAL at 10:34

## 2018-01-14 RX ADMIN — ACETAMINOPHEN 1000 MG: 500 TABLET, FILM COATED ORAL at 17:18

## 2018-01-14 RX ADMIN — FUROSEMIDE 20 MG: 20 TABLET ORAL at 10:34

## 2018-01-14 RX ADMIN — PRAVASTATIN SODIUM 40 MG: 40 TABLET ORAL at 21:40

## 2018-01-14 RX ADMIN — Medication 12.5 MG: at 10:34

## 2018-01-14 RX ADMIN — LEVOFLOXACIN 250 MG: 250 TABLET, FILM COATED ORAL at 14:45

## 2018-01-14 RX ADMIN — Medication 12.5 MG: at 21:40

## 2018-01-14 RX ADMIN — ACETAMINOPHEN 1000 MG: 500 TABLET, FILM COATED ORAL at 21:40

## 2018-01-14 RX ADMIN — ACETAMINOPHEN 1000 MG: 500 TABLET, FILM COATED ORAL at 10:35

## 2018-01-14 RX ADMIN — Medication 15 MG: at 10:34

## 2018-01-14 RX ADMIN — LEVOTHYROXINE SODIUM 88 MCG: 88 TABLET ORAL at 10:35

## 2018-01-14 NOTE — PLAN OF CARE
Problem: Patient Care Overview  Goal: Plan of Care/Patient Progress Review  Outcome: No Change  Pt is alert but not orientated to place time or situation. Pt can be impulsive at times wanting to get out of bed. Vss on 2l of O2, slept well through the night, was up 1 time to void.  heparin drip  Adjusted to 450 unit Continue to monitor.

## 2018-01-14 NOTE — PLAN OF CARE
Problem: Patient Care Overview  Goal: Plan of Care/Patient Progress Review  Outcome: No Change  Alert to self, very confused. Denies pain, SOB on exertion. Vitals stable. TELE SR. Up with 1 and a walker. Heparin gtt running at 450 units/hr. Next 10a in the morning. IV lasix changed to Po lasix and metoprolol decreased to 12.5 mg. IV levaquin changed to PO levaquin.Plan to d/c in 1-2 days. Will continue to monitor the patient.

## 2018-01-14 NOTE — PROGRESS NOTES
[facility]    Hospitalist Progress Note    Date of Service (when I saw the patient): 01/14/2018    Assessment & Plan   Olga Zelaya is a 89 year old female who was admitted on 1/13/2018.   89-year-old female with a history of dementia, hypertension, hyperlipidemia, diet-controlled diabetes mellitus, presenting with:     Chest pain and shortness of breath, multifactorial.  With elevated troponin, non-ST elevation myocardial infarction is a possibility as well and with the bilateral small pleural effusions, congestive heart failure exacerbation is contributing as well and she also has nodular densities with consolidation consistent with pneumonitis and underlying malignancy can not be ruled out\  1. NONSTEMI:  Troponin remained flat at 0.06  Medical management per family request as pt has significant dementia and old age   Continue heparin drip for 48hours. On low dose aspirin, metoprolol 12.5mg PO BID, statin(LDL of 95), long acting nitroglycerin imdur added   Echo pending. Cardiology has signed off   2. Acute CHF exacerbation;  Started on lasix 20mg IV daily on admission will switch to lasix 20mg PO daily today   Follow BMP closely   Echo pending   3. Pneumonia and lung  nodules on CT chest with possible underlying malignancy:  Started on levaquin on admission will complete for 10days course   Repeat CT chest in 3months to f/u on the lung nodules   4. HYpothyroidism:  On levothryroxine   5. Dementia:  At risk of delirium . Monitor closely     DVT Prophylaxis: Heparin drip   Code Status: DNR/DNI    Disposition: Expected discharge in 1-2days if remains stable     Emily Crowell MD  [phone] (P)      Interval History   No SOb this AM. Eating lunch . No chest pain since admission     -Data reviewed today: I reviewed all new labs and imaging results over the last 24 hours. I personally reviewed no images or EKG's today.    Physical Exam   Temp: 97.9  F (36.6  C) Temp src: Oral BP: 124/60 Pulse:  93 Heart Rate: 94 Resp: 16 SpO2: 92 % O2 Device: None (Room air) Oxygen Delivery: 2 LPM  Vitals:    01/14/18 0300   Weight: 64.2 kg (141 lb 9.6 oz)     Vital Signs with Ranges  Temp:  [97.4  F (36.3  C)-98.1  F (36.7  C)] 97.9  F (36.6  C)  Pulse:  [82-93] 93  Heart Rate:  [86-96] 94  Resp:  [16-18] 16  BP: (100-124)/(53-72) 124/60  SpO2:  [92 %-96 %] 92 %  I/O last 3 completed shifts:  In: -   Out: 1250 [Urine:1250]    Constitutional: Awake, alert, cooperative, no apparent distress  Respiratory: Clear to auscultation bilaterally, no crackles or wheezing  Cardiovascular: Regular rate and rhythm, normal S1 and S2, and no murmur noted  GI: Normal bowel sounds, soft, non-distended, non-tender  Skin/Integumen: No rashes, no cyanosis, no edema  Other:     Medications     - MEDICATION INSTRUCTIONS -       - MEDICATION INSTRUCTIONS -       Reason ACE/ARB/ARNI order not selected       HEParin 450 Units/hr (01/14/18 1035)       metoprolol tartrate  12.5 mg Oral BID     isosorbide mononitrate  15 mg Oral Daily     furosemide  20 mg Oral Daily     acetaminophen (TYLENOL) tablet 1,000 mg  1,000 mg Oral TID     levothyroxine (SYNTHROID/LEVOTHROID) tablet 88 mcg  88 mcg Oral Daily     sodium chloride (PF)  3 mL Intracatheter Q8H     aspirin  324 mg Oral Once     pravastatin  40 mg Oral QPM     levofloxacin  250 mg Intravenous Q24H     aspirin  81 mg Oral Daily       Data     Recent Labs  Lab 01/14/18  0543 01/13/18  2153 01/13/18  1745 01/13/18  1430 01/13/18  0635   WBC  --   --   --   --  5.7   HGB  --   --   --   --  11.9   MCV  --   --   --   --  94   PLT  --   --   --   --  254     --   --   --  136   POTASSIUM 4.0  --   --   --  3.8   CHLORIDE 101  --   --   --  100   CO2 28  --   --   --  27   BUN 21  --   --   --  20   CR 1.03  --   --   --  0.98   ANIONGAP 8  --   --   --  9   RADHA 8.8  --   --   --  9.1   *  --   --   --  127*   TROPI  --  0.060* 0.059* 0.060* 0.069*       No results found for this or any  previous visit (from the past 24 hour(s)).

## 2018-01-14 NOTE — PLAN OF CARE
Problem: Patient Care Overview  Goal: Plan of Care/Patient Progress Review  Outcome: Improving  A&O to self only, pleasantly confused, redirectable. Patient does become slightly anxious at times due to confusion. VSS on O2 @ 2L per nasal cannula. Up with assist x1 with walker. Denied chest pain. Heparin drip infusing. IV lasix given, adequate urine output. Tele monitored, SR. Discharge plan pending.

## 2018-01-14 NOTE — PLAN OF CARE
Problem: Patient Care Overview  Goal: Plan of Care/Patient Progress Review  OT/CR: Evaluation and treatment iniatited. Pt lives in memory care at the [facility] in [place]. Pt reported independence in all ADLs (dressing, bathing, toileting) and was ambulating with a walker. Past medical history includes dementia. PLOF needs to be confirmed.    Discharge Planner OT   Patient plan for discharge: Home    Current status: Pt ambulated in the hallway with FWW for 120 feet with CGA. Pt demonstrated increase in fatigue during session and some SOB.Pt confused during session and not oriented to place. Pt on RA during session, oxygen 92%    Barriers to return to prior living situation: Decreased endurance for ADLs; SOB; Oxygen needs    Recommendations for discharge: home with previous level of assist     Rationale for recommendations: Pt lives in memory care and has assist available for I/ADLs        Entered by: Nick Whitlock 01/14/2018 10:46 AM

## 2018-01-14 NOTE — PLAN OF CARE
Problem: Cardiac: ACS (Acute Coronary Syndrome) (Adult)  Goal: Signs and Symptoms of Listed Potential Problems Will be Absent, Minimized or Managed (Cardiac: ACS)  Signs and symptoms of listed potential problems will be absent, minimized or managed by discharge/transition of care (reference Cardiac: ACS (Acute Coronary Syndrome) (Adult) CPG).   Outcome: No Change  VSS. Monitor remains sinus rhythm. Pt. Denies pain. Heparin drip continues at 550 u/hr. Plan for echo tomorrow. Continue to monitor and observe closely.

## 2018-01-14 NOTE — PROGRESS NOTES
"   01/14/18 1008   Quick Adds   Type of Visit Initial Occupational Therapy Evaluation   Living Environment   Lives With facility resident   Living Arrangements assisted living   Home Accessibility no concerns   Self-Care   Usual Activity Tolerance moderate   Current Activity Tolerance moderate   Equipment Currently Used at Home walker, standard;grab bar   Functional Level Prior   Ambulation 1-->assistive equipment   Transferring 1-->assistive equipment   Toileting 0-->independent   Bathing 0-->independent   Dressing 0-->independent   Cognition 1 - attention or memory deficits   General Information   Onset of Illness/Injury or Date of Surgery - Date 01/13/18   Referring Physician Mervat   Patient/Family Goals Statement Home   Additional Occupational Profile Info/Pertinent History of Current Problem Per chart: \"small NSTEMI. Based on the patient's age and comorbidities it is likely best serve that we manage this in an ischemic driven fashion.\"   Precautions/Limitations fall precautions   General Info Comments Hard of hearing    Cognitive Status Examination   Orientation not oriented to person, place or time   Cognitive Comment Per chart: Dementia   Visual Perception   Visual Perception Wears glasses   Mobility   Bed Mobility Bed mobility skill: Sit to supine;Bed mobility skill: Supine to sit   Bed Mobility Skill: Sit to Supine   Level of [place]: Sit/Supine contact guard   Bed Mobility Skill: Supine to Sit   Level of [place]: Supine/Sit contact guard   Transfer Skills   Transfer Transfer Skill: Stand to Sit;Transfer Safety Analysis Bed/Chair;Transfer Safety Analysis Sit/Stand   Transfer Skill: Bed to Chair/Chair to Bed   Level of [place]: Bed to Chair contact guard   Transfer Skill: Sit to Stand   Level of [place]: Sit/Stand contact guard   Toilet Transfer   Toilet Transfer Toilet Transfer Skill;Toilet Transfer Safety Analysis   Transfer Skill: Toilet Transfer   Level of [place]: Toilet contact " "guard   General Therapy Interventions   Planned Therapy Interventions ADL retraining;IADL retraining;cognition;strengthening;risk factor education;progressive activity/exercise;home program guidelines;transfer training   Clinical Impression   Criteria for Skilled Therapeutic Interventions Met yes, treatment indicated   OT Diagnosis Decreased endurance for ADLs   Influenced by the following impairments cognition, decreased endurance for ADLs   Assessment of Occupational Performance 1-3 Performance Deficits   Identified Performance Deficits Decreased endurance for ADLs (dressing, bathing, toileting)   Clinical Decision Making (Complexity) Low complexity   Therapy Frequency daily   Predicted Duration of Therapy Intervention (days/wks) 3 days   Anticipated Discharge Disposition Home with Assist  (To facility residence )   Risks and Benefits of Treatment have been explained. Yes   Patient, Family & other staff in agreement with plan of care Yes   [facility] TM \"6 Clicks\"   2016, Trustees of [facility], under license to [employer].  All rights reserved.   6 Clicks Short Forms Daily Activity Inpatient Short Form   [facility]  \"6 Clicks\" Daily Activity Inpatient Short Form   1. Putting on and taking off regular lower body clothing? 3 - A Little   2. Bathing (including washing, rinsing, drying)? 3 - A Little   3. Toileting, which includes using toilet, bedpan or urinal? 3 - A Little   4. Putting on and taking off regular upper body clothing? 4 - None   5. Taking care of personal grooming such as brushing teeth? 3 - A Little   6. Eating meals? 4 - None   Daily Activity Raw Score (Score out of 24.Lower scores equate to lower levels of function) 20   Total Evaluation Time   Total Evaluation Time (Minutes) 8     "

## 2018-01-15 ENCOUNTER — DOCUMENTATION ONLY (OUTPATIENT)
Dept: OTHER | Facility: CLINIC | Age: 83
End: 2018-01-15

## 2018-01-15 ENCOUNTER — APPOINTMENT (OUTPATIENT)
Dept: OCCUPATIONAL THERAPY | Facility: CLINIC | Age: 83
DRG: 280 | End: 2018-01-15
Payer: MEDICARE

## 2018-01-15 LAB
ANION GAP SERPL CALCULATED.3IONS-SCNC: 5 MMOL/L (ref 3–14)
BUN SERPL-MCNC: 22 MG/DL (ref 7–30)
CALCIUM SERPL-MCNC: 8.5 MG/DL (ref 8.5–10.1)
CHLORIDE SERPL-SCNC: 99 MMOL/L (ref 94–109)
CO2 SERPL-SCNC: 31 MMOL/L (ref 20–32)
CREAT SERPL-MCNC: 1.07 MG/DL (ref 0.52–1.04)
ERYTHROCYTE [DISTWIDTH] IN BLOOD BY AUTOMATED COUNT: 13.7 % (ref 10–15)
GFR SERPL CREATININE-BSD FRML MDRD: 48 ML/MIN/1.7M2
GLUCOSE SERPL-MCNC: 114 MG/DL (ref 70–99)
HBA1C MFR BLD: 6.9 % (ref 4.3–6)
HCT VFR BLD AUTO: 32.3 % (ref 35–47)
HGB BLD-MCNC: 10.4 G/DL (ref 11.7–15.7)
LMWH PPP CHRO-ACNC: 0.15 IU/ML
MCH RBC QN AUTO: 31 PG (ref 26.5–33)
MCHC RBC AUTO-ENTMCNC: 32.2 G/DL (ref 31.5–36.5)
MCV RBC AUTO: 96 FL (ref 78–100)
PLATELET # BLD AUTO: 232 10E9/L (ref 150–450)
POTASSIUM SERPL-SCNC: 4 MMOL/L (ref 3.4–5.3)
RBC # BLD AUTO: 3.36 10E12/L (ref 3.8–5.2)
SODIUM SERPL-SCNC: 135 MMOL/L (ref 133–144)
WBC # BLD AUTO: 3.8 10E9/L (ref 4–11)

## 2018-01-15 PROCEDURE — 97110 THERAPEUTIC EXERCISES: CPT | Mod: GO | Performed by: OCCUPATIONAL THERAPIST

## 2018-01-15 PROCEDURE — 94640 AIRWAY INHALATION TREATMENT: CPT

## 2018-01-15 PROCEDURE — 85027 COMPLETE CBC AUTOMATED: CPT | Performed by: INTERNAL MEDICINE

## 2018-01-15 PROCEDURE — A9270 NON-COVERED ITEM OR SERVICE: HCPCS | Mod: GY | Performed by: INTERNAL MEDICINE

## 2018-01-15 PROCEDURE — 36415 COLL VENOUS BLD VENIPUNCTURE: CPT | Performed by: INTERNAL MEDICINE

## 2018-01-15 PROCEDURE — 40000133 ZZH STATISTIC OT WARD VISIT: Performed by: OCCUPATIONAL THERAPIST

## 2018-01-15 PROCEDURE — 25000125 ZZHC RX 250: Performed by: INTERNAL MEDICINE

## 2018-01-15 PROCEDURE — 25000132 ZZH RX MED GY IP 250 OP 250 PS 637: Mod: GY | Performed by: INTERNAL MEDICINE

## 2018-01-15 PROCEDURE — 83036 HEMOGLOBIN GLYCOSYLATED A1C: CPT | Performed by: INTERNAL MEDICINE

## 2018-01-15 PROCEDURE — 25000128 H RX IP 250 OP 636: Performed by: INTERNAL MEDICINE

## 2018-01-15 PROCEDURE — 99233 SBSQ HOSP IP/OBS HIGH 50: CPT | Performed by: HOSPITALIST

## 2018-01-15 PROCEDURE — 94640 AIRWAY INHALATION TREATMENT: CPT | Mod: 76

## 2018-01-15 PROCEDURE — 85520 HEPARIN ASSAY: CPT | Performed by: INTERNAL MEDICINE

## 2018-01-15 PROCEDURE — 40000275 ZZH STATISTIC RCP TIME EA 10 MIN

## 2018-01-15 PROCEDURE — 80048 BASIC METABOLIC PNL TOTAL CA: CPT | Performed by: INTERNAL MEDICINE

## 2018-01-15 PROCEDURE — 21000001 ZZH R&B HEART CARE

## 2018-01-15 PROCEDURE — 83036 HEMOGLOBIN GLYCOSYLATED A1C: CPT | Performed by: HOSPITALIST

## 2018-01-15 PROCEDURE — 25000125 ZZHC RX 250: Performed by: HOSPITALIST

## 2018-01-15 PROCEDURE — 99207 ZZC CDG-MDM COMPONENT: MEETS MODERATE - UP CODED: CPT | Performed by: HOSPITALIST

## 2018-01-15 RX ORDER — ALBUTEROL SULFATE 0.83 MG/ML
2.5 SOLUTION RESPIRATORY (INHALATION)
Status: DISCONTINUED | OUTPATIENT
Start: 2018-01-15 | End: 2018-01-21 | Stop reason: HOSPADM

## 2018-01-15 RX ADMIN — ONDANSETRON 4 MG: 4 TABLET, ORALLY DISINTEGRATING ORAL at 05:47

## 2018-01-15 RX ADMIN — FUROSEMIDE 20 MG: 20 TABLET ORAL at 08:22

## 2018-01-15 RX ADMIN — Medication 12.5 MG: at 22:12

## 2018-01-15 RX ADMIN — ACETAMINOPHEN 1000 MG: 500 TABLET, FILM COATED ORAL at 22:12

## 2018-01-15 RX ADMIN — PRAVASTATIN SODIUM 40 MG: 40 TABLET ORAL at 22:12

## 2018-01-15 RX ADMIN — ALBUTEROL SULFATE 2.5 MG: 2.5 SOLUTION RESPIRATORY (INHALATION) at 10:13

## 2018-01-15 RX ADMIN — LEVOFLOXACIN 250 MG: 250 TABLET, FILM COATED ORAL at 15:13

## 2018-01-15 RX ADMIN — HEPARIN SODIUM 450 UNITS/HR: 10000 INJECTION, SOLUTION INTRAVENOUS at 06:14

## 2018-01-15 RX ADMIN — Medication 15 MG: at 08:22

## 2018-01-15 RX ADMIN — ACETAMINOPHEN 1000 MG: 500 TABLET, FILM COATED ORAL at 17:44

## 2018-01-15 RX ADMIN — ASPIRIN 81 MG 81 MG: 81 TABLET ORAL at 08:22

## 2018-01-15 RX ADMIN — ACETAMINOPHEN 1000 MG: 500 TABLET, FILM COATED ORAL at 08:23

## 2018-01-15 RX ADMIN — LEVOTHYROXINE SODIUM 88 MCG: 88 TABLET ORAL at 08:22

## 2018-01-15 RX ADMIN — ALBUTEROL SULFATE 2.5 MG: 2.5 SOLUTION RESPIRATORY (INHALATION) at 13:43

## 2018-01-15 RX ADMIN — Medication 12.5 MG: at 08:23

## 2018-01-15 NOTE — PLAN OF CARE
Problem: Patient Care Overview  Goal: Plan of Care/Patient Progress Review  Discharge Planner OT   Patient plan for discharge: none stated  Current status: Supine <> sit with CGA/SBA, sit <> stand with CGA and ambulates with ww with CGA approx 400 ft with fatigue and stable CV response. Pt forgetful, needed several cues that she is in the hospital due to heart attack. O2 sats 97% after walk with 2 L O2.   Barriers to return to prior living situation: decreased cognition, activity tolerance, balance, overall strength.   Recommendations for discharge: return to memory care with A with ADLs as needed and A with all IADL's.   Rationale for recommendations: A at memory care for ADLs' and all IADL's to insure safety.        Entered by: Lorene Mendez 01/15/2018 2:58 PM

## 2018-01-15 NOTE — PLAN OF CARE
Problem: Patient Care Overview  Goal: Plan of Care/Patient Progress Review  Confused. Restless at time. Using oxygen at 2L n.c.Lungs have few crackles at base and expiratory wheeze. VSS.  Ambulating to bathroom and in hill. Heparin drip stopped. Taking diet well.

## 2018-01-15 NOTE — PLAN OF CARE
Problem: Patient Care Overview  Goal: Plan of Care/Patient Progress Review  Outcome: No Change  Alert to self. VSS on RA during the day, desats to 90-91% at night, started on 2L NC. Tele SR. Up with 1, gait belt and walker. Pt is very [place]. Heparin gtt at 450 units/hr. Pt reported nausea, gave zofran at 0545, with relief of symptoms. Will continue to monitor.

## 2018-01-15 NOTE — PROGRESS NOTES
[facility]    Hospitalist Progress Note      Assessment & Plan   Olga Zelaya is a 89 year old female who was admitted on 1/13/2018.  Past history of dementia, DM II, HTN, hyperlipidemia, hypothyroidism who presents with chest pain and dyspnea, found to have NSTEMI and possible PNA.    NSTEMI:  Admission EKG without ischemic changes, serial troponin mildly elevated but flat.  HDL 61, LDL 95.  Managed medically per family wishes.  Cardiology signed off.  - will complete 48 hours heparin gtt today about 1200  - TTE 1/14 read pending  - continue PTA aspirin, initiated on metoprolol, pravastatin, Imdur, lasix this admission  - repeat BMP in AM as Cr slightly up since admission  - continues to require intermittent oxygen, wean as able  - mild wheezing bilaterally, will start albuterol neb q6h    CAP, possible lung nodules: Admission CT chest with patchy nodular opacities, primarily of RLL, in addition to some mediastinal LAD.  No fever or leukocytosis.  - initiated on 10-day course levofloxacin  - follow CT as outpatient in 3 months  - new leukopenia 1/15, monitor CBC    DM II:  Last A1C from 4/2017 was 7.3%.  - Add-on A1C    Chronic anemia:  Baseline hgb 10-11 g/dL, stable.    Hypothyroidism:  Continue PTA Synthroid.    Dementia:  Monitor for delirium.    DVT Prophylaxis: Pneumatic Compression Devices  Code Status: DNR/DNI    Disposition: Expected discharge in 1 days once weaned from oxygen.    Owen Melara    Interval History   Patient reports mild dyspnea currently, denies any chest pain/pressure, cough, fever/chills.  Endorses mild wheezing.  No other complaints.    -Data reviewed today: I reviewed all new labs and imaging results over the last 24 hours. I personally reviewed no images or EKG's today.    Physical Exam   Temp: 98  F (36.7  C) Temp src: Axillary BP: 122/67 Pulse: 76 Heart Rate: 96 Resp: 18 SpO2: 95 % O2 Device: Nasal cannula Oxygen Delivery: 2 LPM  Vitals:    01/14/18 0300  01/15/18 0330   Weight: 64.2 kg (141 lb 9.6 oz) 64.5 kg (142 lb 3.2 oz)     Vital Signs with Ranges  Temp:  [97.3  F (36.3  C)-98  F (36.7  C)] 98  F (36.7  C)  Pulse:  [76] 76  Heart Rate:  [82-96] 96  Resp:  [18] 18  BP: ()/(51-71) 122/67  SpO2:  [91 %-97 %] 95 %  I/O last 3 completed shifts:  In: 716 [P.O.:716]  Out: 875 [Urine:875]    Constitutional: Well developed, well nourished elderly female appearing fatigued  Respiratory: Mild expiratory wheezing bilaterally without crackles or tachypnea  Cardiovascular: regular rate and rhythm, normal S1/S2 without murmur, rubs or gallops, no peripheral edema  GI: abdomen soft, non-tender, non-distended, normal bowel sounds  Skin/Integumen:    Other:  alert, oriented to self only, cranial nerves grossly intact    Medications     HEParin       - MEDICATION INSTRUCTIONS -       - MEDICATION INSTRUCTIONS -       Reason ACE/ARB/ARNI order not selected         albuterol  2.5 mg Nebulization Q6H WA     metoprolol tartrate  12.5 mg Oral BID     isosorbide mononitrate  15 mg Oral Daily     furosemide  20 mg Oral Daily     levofloxacin  250 mg Oral Daily     acetaminophen (TYLENOL) tablet 1,000 mg  1,000 mg Oral TID     levothyroxine (SYNTHROID/LEVOTHROID) tablet 88 mcg  88 mcg Oral Daily     sodium chloride (PF)  3 mL Intracatheter Q8H     aspirin  324 mg Oral Once     pravastatin  40 mg Oral QPM     aspirin  81 mg Oral Daily       Data     Recent Labs  Lab 01/15/18  0550 01/14/18  0543 01/13/18  2153 01/13/18  1745 01/13/18  1430 01/13/18  0635   WBC 3.8*  --   --   --   --  5.7   HGB 10.4*  --   --   --   --  11.9   MCV 96  --   --   --   --  94     --   --   --   --  254    137  --   --   --  136   POTASSIUM 4.0 4.0  --   --   --  3.8   CHLORIDE 99 101  --   --   --  100   CO2 31 28  --   --   --  27   BUN 22 21  --   --   --  20   CR 1.07* 1.03  --   --   --  0.98   ANIONGAP 5 8  --   --   --  9   RADHA 8.5 8.8  --   --   --  9.1   * 108*  --   --   --   127*   TROPI  --   --  0.060* 0.059* 0.060* 0.069*       No results found for this or any previous visit (from the past 24 hour(s)).

## 2018-01-16 ENCOUNTER — RESULTS ONLY (OUTPATIENT)
Facility: CLINIC | Age: 83
End: 2018-01-16

## 2018-01-16 LAB
ALBUMIN SERPL-MCNC: 3 G/DL (ref 3.4–5)
ALP SERPL-CCNC: 48 U/L (ref 40–150)
ALT SERPL W P-5'-P-CCNC: 12 U/L (ref 0–50)
ANION GAP SERPL CALCULATED.3IONS-SCNC: 5 MMOL/L (ref 3–14)
AST SERPL W P-5'-P-CCNC: 15 U/L (ref 0–45)
BILIRUB DIRECT SERPL-MCNC: 0.1 MG/DL (ref 0–0.2)
BILIRUB SERPL-MCNC: 0.5 MG/DL (ref 0.2–1.3)
BUN SERPL-MCNC: 23 MG/DL (ref 7–30)
CALCIUM SERPL-MCNC: 8.5 MG/DL (ref 8.5–10.1)
CHLORIDE SERPL-SCNC: 98 MMOL/L (ref 94–109)
CO2 SERPL-SCNC: 33 MMOL/L (ref 20–32)
CREAT SERPL-MCNC: 1.13 MG/DL (ref 0.52–1.04)
ERYTHROCYTE [DISTWIDTH] IN BLOOD BY AUTOMATED COUNT: 13.7 % (ref 10–15)
GFR SERPL CREATININE-BSD FRML MDRD: 45 ML/MIN/1.7M2
GLUCOSE SERPL-MCNC: 146 MG/DL (ref 70–99)
HCT VFR BLD AUTO: 32.1 % (ref 35–47)
HGB BLD-MCNC: 10.3 G/DL (ref 11.7–15.7)
INTERPRETATION ECG - MUSE: NORMAL
LIPASE SERPL-CCNC: 98 U/L (ref 73–393)
MCH RBC QN AUTO: 30.5 PG (ref 26.5–33)
MCHC RBC AUTO-ENTMCNC: 32.1 G/DL (ref 31.5–36.5)
MCV RBC AUTO: 95 FL (ref 78–100)
PLATELET # BLD AUTO: 206 10E9/L (ref 150–450)
POTASSIUM SERPL-SCNC: 4.1 MMOL/L (ref 3.4–5.3)
PROT SERPL-MCNC: 6.5 G/DL (ref 6.8–8.8)
RBC # BLD AUTO: 3.38 10E12/L (ref 3.8–5.2)
SODIUM SERPL-SCNC: 136 MMOL/L (ref 133–144)
WBC # BLD AUTO: 5.3 10E9/L (ref 4–11)

## 2018-01-16 PROCEDURE — A9270 NON-COVERED ITEM OR SERVICE: HCPCS | Mod: GY | Performed by: INTERNAL MEDICINE

## 2018-01-16 PROCEDURE — 99232 SBSQ HOSP IP/OBS MODERATE 35: CPT | Performed by: HOSPITALIST

## 2018-01-16 PROCEDURE — 80076 HEPATIC FUNCTION PANEL: CPT | Performed by: HOSPITALIST

## 2018-01-16 PROCEDURE — 25000132 ZZH RX MED GY IP 250 OP 250 PS 637: Mod: GY | Performed by: INTERNAL MEDICINE

## 2018-01-16 PROCEDURE — 40000275 ZZH STATISTIC RCP TIME EA 10 MIN

## 2018-01-16 PROCEDURE — 25000128 H RX IP 250 OP 636: Performed by: INTERNAL MEDICINE

## 2018-01-16 PROCEDURE — 80048 BASIC METABOLIC PNL TOTAL CA: CPT | Performed by: HOSPITALIST

## 2018-01-16 PROCEDURE — 85027 COMPLETE CBC AUTOMATED: CPT | Performed by: HOSPITALIST

## 2018-01-16 PROCEDURE — 21000001 ZZH R&B HEART CARE

## 2018-01-16 PROCEDURE — 25000132 ZZH RX MED GY IP 250 OP 250 PS 637: Mod: GY | Performed by: HOSPITALIST

## 2018-01-16 PROCEDURE — 94640 AIRWAY INHALATION TREATMENT: CPT | Mod: 76

## 2018-01-16 PROCEDURE — A9270 NON-COVERED ITEM OR SERVICE: HCPCS | Mod: GY | Performed by: HOSPITALIST

## 2018-01-16 PROCEDURE — 99207 ZZC CDG-MDM COMPONENT: MEETS LOW - DOWN CODED: CPT | Performed by: HOSPITALIST

## 2018-01-16 PROCEDURE — 93010 ELECTROCARDIOGRAM REPORT: CPT | Performed by: INTERNAL MEDICINE

## 2018-01-16 PROCEDURE — 93005 ELECTROCARDIOGRAM TRACING: CPT

## 2018-01-16 PROCEDURE — 25000125 ZZHC RX 250: Performed by: HOSPITALIST

## 2018-01-16 PROCEDURE — 83690 ASSAY OF LIPASE: CPT | Performed by: HOSPITALIST

## 2018-01-16 PROCEDURE — 36415 COLL VENOUS BLD VENIPUNCTURE: CPT | Performed by: HOSPITALIST

## 2018-01-16 PROCEDURE — 94640 AIRWAY INHALATION TREATMENT: CPT

## 2018-01-16 RX ORDER — FUROSEMIDE 20 MG/1
10 TABLET ORAL DAILY
Status: DISCONTINUED | OUTPATIENT
Start: 2018-01-17 | End: 2018-01-16

## 2018-01-16 RX ORDER — LISINOPRIL 2.5 MG/1
2.5 TABLET ORAL DAILY
Status: DISCONTINUED | OUTPATIENT
Start: 2018-01-16 | End: 2018-01-21 | Stop reason: HOSPADM

## 2018-01-16 RX ADMIN — Medication 12.5 MG: at 21:11

## 2018-01-16 RX ADMIN — ASPIRIN 81 MG 81 MG: 81 TABLET ORAL at 09:45

## 2018-01-16 RX ADMIN — ALBUTEROL SULFATE 2.5 MG: 2.5 SOLUTION RESPIRATORY (INHALATION) at 03:13

## 2018-01-16 RX ADMIN — LIDOCAINE HYDROCHLORIDE 15 ML: 20 SOLUTION ORAL; TOPICAL at 09:52

## 2018-01-16 RX ADMIN — ACETAMINOPHEN 1000 MG: 500 TABLET, FILM COATED ORAL at 16:56

## 2018-01-16 RX ADMIN — ALBUTEROL SULFATE 2.5 MG: 2.5 SOLUTION RESPIRATORY (INHALATION) at 08:19

## 2018-01-16 RX ADMIN — Medication 15 MG: at 09:45

## 2018-01-16 RX ADMIN — ACETAMINOPHEN 1000 MG: 500 TABLET, FILM COATED ORAL at 09:45

## 2018-01-16 RX ADMIN — PROCHLORPERAZINE EDISYLATE 5 MG: 5 INJECTION INTRAMUSCULAR; INTRAVENOUS at 08:08

## 2018-01-16 RX ADMIN — LEVOTHYROXINE SODIUM 88 MCG: 88 TABLET ORAL at 09:45

## 2018-01-16 RX ADMIN — LEVOFLOXACIN 250 MG: 250 TABLET, FILM COATED ORAL at 13:39

## 2018-01-16 RX ADMIN — ALBUTEROL SULFATE 2.5 MG: 2.5 SOLUTION RESPIRATORY (INHALATION) at 20:05

## 2018-01-16 RX ADMIN — ONDANSETRON 4 MG: 2 SOLUTION INTRAMUSCULAR; INTRAVENOUS at 06:28

## 2018-01-16 RX ADMIN — Medication 12.5 MG: at 09:45

## 2018-01-16 RX ADMIN — PRAVASTATIN SODIUM 40 MG: 40 TABLET ORAL at 21:11

## 2018-01-16 RX ADMIN — ACETAMINOPHEN 1000 MG: 500 TABLET, FILM COATED ORAL at 21:11

## 2018-01-16 RX ADMIN — LISINOPRIL 2.5 MG: 2.5 TABLET ORAL at 09:45

## 2018-01-16 RX ADMIN — NITROGLYCERIN 0.4 MG: 0.4 TABLET SUBLINGUAL at 03:57

## 2018-01-16 NOTE — PLAN OF CARE
Problem: Patient Care Overview  Goal: Plan of Care/Patient Progress Review  Outcome: Improving  Pt VSS on 2L NC, was having some difficulty with breathing lovernights, sats maintained, improved slightly with neb treatment, chest tightness persisted, obatined EKG. Appears unchanged, given 1 of nitro with some relief, bp dropped to 100/58. Tele SR w/ 1 degree AVB. Pt confused, oriented only to self, calls most of the time. Up with A of 1, using bathroom. Urinated small amounts frequently. This am woke up feeling nauseous and like she was going to vomit, given iv zofran and sips of water with some relief. Slept. Plan to continue abxs and lasix, possibly d/c today. Continue to monitor.  Addendum: Was able to wean pt to RA, ambulating to bathroom without desaturating.

## 2018-01-16 NOTE — PROGRESS NOTES
[facility]    Hospitalist Progress Note      Assessment & Plan   Olga Zelaya is a 89 year old female who was admitted on 1/13/2018.  Past history of dementia, DM II, HTN, hyperlipidemia, hypothyroidism who presents with chest pain and dyspnea, found to have NSTEMI and possible PNA.    NSTEMI:  Admission EKG without ischemic changes, serial troponin mildly elevated but flat.  HDL 61, LDL 95.  Managed medically per family wishes, completed 48-hours heparin gtt on 1/15.  TTE 1/14 with EF 25-30% with anteroapical and inferolateral hypokinesis (new from TTE 9/2015 in [facility]).  Cardiology signed off.  - continue PTA aspirin, initiated on metoprolol, pravastatin, Imdur this admission  - will require ACE due to low EF, start lisinopril 2.5 mg daily and follow Cr  - hold lasix 1/16 as Cr trending up, resume 1/17 at reduced dose of 10 mg daily if BMP stable  - weaned to room air 1/16    CAP, possible lung nodules: Admission CT chest with patchy nodular opacities, primarily of RLL, in addition to some mediastinal LAD.  No fever or leukocytosis.  - initiated on 10-day course levofloxacin 1/13  - follow CT as outpatient in 3 months  - leukopenia resolved 1/16, no further monitoring required    Epigastric pain:  On 1/16 complaining of epigastric discomfort reproducible on palpation.  - GI cocktail x1  - add-on LFT and lipase    DM II:  Diet controlled.  A1C 6.9% this admission.    Chronic anemia:  Baseline hgb 10-11 g/dL, stable.    Hypothyroidism:  Continue PTA Synthroid.    Dementia:  Monitor for delirium.    DVT Prophylaxis: Pneumatic Compression Devices  Code Status: DNR/DNI    Disposition: Expected discharge in 1-2 days pending stable Cr and med optimization.    Owen Melara    Interval History   Patient reports ongoing dyspnea, also reporting some diaphoresis this morning.  Reports epigastric pain, which is reproducible on palpation.  Denies any chest pain/pressure.  Reports no history of  GERD.  No other complaints.    -Data reviewed today: I reviewed all new labs and imaging results over the last 24 hours. I personally reviewed no images or EKG's today.    Physical Exam   Temp: 99.1  F (37.3  C) Temp src: Oral BP: 128/74   Heart Rate: 108 Resp: 18 SpO2: 95 % O2 Device: None (Room air) Oxygen Delivery: 2 LPM  Vitals:    01/14/18 0300 01/15/18 0330 01/16/18 0500   Weight: 64.2 kg (141 lb 9.6 oz) 64.5 kg (142 lb 3.2 oz) 64.2 kg (141 lb 9.6 oz)     Vital Signs with Ranges  Temp:  [97.4  F (36.3  C)-99.1  F (37.3  C)] 99.1  F (37.3  C)  Heart Rate:  [] 108  Resp:  [16-22] 18  BP: ()/(51-75) 128/74  SpO2:  [94 %-97 %] 95 %  I/O last 3 completed shifts:  In: 360 [P.O.:360]  Out: 1350 [Urine:1350]    Constitutional: Well developed, well nourished elderly female in no acute distress  Respiratory: Minimal expiratory wheezing bilaterally with few scattered basilar crackles  Cardiovascular: regular rate and rhythm, normal S1/S2 without murmur, rubs or gallops, no peripheral edema  GI: abdomen soft, epigastric tenderness to palpation, non-distended, normal bowel sounds  Skin/Integumen:    Other:  alert, confused, cranial nerves grossly intact    Medications     - MEDICATION INSTRUCTIONS -       - MEDICATION INSTRUCTIONS -       Reason ACE/ARB/ARNI order not selected         [START ON 1/17/2018] furosemide  10 mg Oral Daily     albuterol  2.5 mg Nebulization Q6H WA     metoprolol tartrate  12.5 mg Oral BID     isosorbide mononitrate  15 mg Oral Daily     levofloxacin  250 mg Oral Daily     acetaminophen (TYLENOL) tablet 1,000 mg  1,000 mg Oral TID     levothyroxine (SYNTHROID/LEVOTHROID) tablet 88 mcg  88 mcg Oral Daily     sodium chloride (PF)  3 mL Intracatheter Q8H     aspirin  324 mg Oral Once     pravastatin  40 mg Oral QPM     aspirin  81 mg Oral Daily       Data     Recent Labs  Lab 01/16/18  0626 01/15/18  0550 01/14/18  0543 01/13/18  2153 01/13/18  1745 01/13/18  1430 01/13/18  0635   WBC  5.3 3.8*  --   --   --   --  5.7   HGB 10.3* 10.4*  --   --   --   --  11.9   MCV 95 96  --   --   --   --  94    232  --   --   --   --  254    135 137  --   --   --  136   POTASSIUM 4.1 4.0 4.0  --   --   --  3.8   CHLORIDE 98 99 101  --   --   --  100   CO2 33* 31 28  --   --   --  27   BUN 23 22 21  --   --   --  20   CR 1.13* 1.07* 1.03  --   --   --  0.98   ANIONGAP 5 5 8  --   --   --  9   RADHA 8.5 8.5 8.8  --   --   --  9.1   * 114* 108*  --   --   --  127*   TROPI  --   --   --  0.060* 0.059* 0.060* 0.069*       No results found for this or any previous visit (from the past 24 hour(s)).

## 2018-01-17 LAB
ANION GAP SERPL CALCULATED.3IONS-SCNC: 4 MMOL/L (ref 3–14)
BUN SERPL-MCNC: 21 MG/DL (ref 7–30)
CALCIUM SERPL-MCNC: 8.8 MG/DL (ref 8.5–10.1)
CHLORIDE SERPL-SCNC: 96 MMOL/L (ref 94–109)
CO2 SERPL-SCNC: 32 MMOL/L (ref 20–32)
CREAT SERPL-MCNC: 1.07 MG/DL (ref 0.52–1.04)
FLUAV+FLUBV RNA SPEC QL NAA+PROBE: NEGATIVE
FLUAV+FLUBV RNA SPEC QL NAA+PROBE: NEGATIVE
GFR SERPL CREATININE-BSD FRML MDRD: 48 ML/MIN/1.7M2
GLUCOSE SERPL-MCNC: 130 MG/DL (ref 70–99)
NT-PROBNP SERPL-MCNC: ABNORMAL PG/ML (ref 0–1800)
POTASSIUM SERPL-SCNC: 4.2 MMOL/L (ref 3.4–5.3)
RSV RNA SPEC NAA+PROBE: NEGATIVE
SODIUM SERPL-SCNC: 132 MMOL/L (ref 133–144)
SPECIMEN SOURCE: NORMAL

## 2018-01-17 PROCEDURE — 25000125 ZZHC RX 250: Performed by: HOSPITALIST

## 2018-01-17 PROCEDURE — 83880 ASSAY OF NATRIURETIC PEPTIDE: CPT | Performed by: HOSPITALIST

## 2018-01-17 PROCEDURE — 36415 COLL VENOUS BLD VENIPUNCTURE: CPT | Performed by: HOSPITALIST

## 2018-01-17 PROCEDURE — 25000132 ZZH RX MED GY IP 250 OP 250 PS 637: Mod: GY | Performed by: INTERNAL MEDICINE

## 2018-01-17 PROCEDURE — A9270 NON-COVERED ITEM OR SERVICE: HCPCS | Mod: GY | Performed by: INTERNAL MEDICINE

## 2018-01-17 PROCEDURE — 25000132 ZZH RX MED GY IP 250 OP 250 PS 637: Mod: GY | Performed by: HOSPITALIST

## 2018-01-17 PROCEDURE — 87631 RESP VIRUS 3-5 TARGETS: CPT | Performed by: HOSPITALIST

## 2018-01-17 PROCEDURE — 94640 AIRWAY INHALATION TREATMENT: CPT | Mod: 76

## 2018-01-17 PROCEDURE — 99233 SBSQ HOSP IP/OBS HIGH 50: CPT | Performed by: HOSPITALIST

## 2018-01-17 PROCEDURE — 80048 BASIC METABOLIC PNL TOTAL CA: CPT | Performed by: HOSPITALIST

## 2018-01-17 PROCEDURE — 94640 AIRWAY INHALATION TREATMENT: CPT

## 2018-01-17 PROCEDURE — 40000275 ZZH STATISTIC RCP TIME EA 10 MIN

## 2018-01-17 PROCEDURE — A9270 NON-COVERED ITEM OR SERVICE: HCPCS | Mod: GY | Performed by: HOSPITALIST

## 2018-01-17 PROCEDURE — 21000001 ZZH R&B HEART CARE

## 2018-01-17 PROCEDURE — 25000128 H RX IP 250 OP 636: Performed by: INTERNAL MEDICINE

## 2018-01-17 RX ORDER — FUROSEMIDE 20 MG
20 TABLET ORAL DAILY
Status: DISCONTINUED | OUTPATIENT
Start: 2018-01-17 | End: 2018-01-18

## 2018-01-17 RX ORDER — CALCIUM CARBONATE 500 MG/1
500-1000 TABLET, CHEWABLE ORAL
Status: DISCONTINUED | OUTPATIENT
Start: 2018-01-17 | End: 2018-01-21 | Stop reason: HOSPADM

## 2018-01-17 RX ORDER — ISOSORBIDE MONONITRATE 30 MG/1
30 TABLET, EXTENDED RELEASE ORAL DAILY
Status: DISCONTINUED | OUTPATIENT
Start: 2018-01-17 | End: 2018-01-18

## 2018-01-17 RX ORDER — ALBUTEROL SULFATE 0.83 MG/ML
2.5 SOLUTION RESPIRATORY (INHALATION)
Status: DISCONTINUED | OUTPATIENT
Start: 2018-01-17 | End: 2018-01-21 | Stop reason: HOSPADM

## 2018-01-17 RX ADMIN — ALBUTEROL SULFATE 2.5 MG: 2.5 SOLUTION RESPIRATORY (INHALATION) at 07:54

## 2018-01-17 RX ADMIN — ONDANSETRON 4 MG: 2 SOLUTION INTRAMUSCULAR; INTRAVENOUS at 09:20

## 2018-01-17 RX ADMIN — ALBUTEROL SULFATE 2.5 MG: 2.5 SOLUTION RESPIRATORY (INHALATION) at 20:04

## 2018-01-17 RX ADMIN — LEVOFLOXACIN 250 MG: 250 TABLET, FILM COATED ORAL at 14:29

## 2018-01-17 RX ADMIN — ASPIRIN 81 MG 81 MG: 81 TABLET ORAL at 09:21

## 2018-01-17 RX ADMIN — Medication 6.25 MG: at 21:30

## 2018-01-17 RX ADMIN — Medication 6.25 MG: at 11:11

## 2018-01-17 RX ADMIN — ALBUTEROL SULFATE 2.5 MG: 2.5 SOLUTION RESPIRATORY (INHALATION) at 12:31

## 2018-01-17 RX ADMIN — ISOSORBIDE MONONITRATE 30 MG: 30 TABLET, EXTENDED RELEASE ORAL at 09:21

## 2018-01-17 RX ADMIN — PRAVASTATIN SODIUM 40 MG: 40 TABLET ORAL at 21:30

## 2018-01-17 RX ADMIN — ACETAMINOPHEN 1000 MG: 500 TABLET, FILM COATED ORAL at 09:22

## 2018-01-17 RX ADMIN — LEVOTHYROXINE SODIUM 88 MCG: 88 TABLET ORAL at 09:21

## 2018-01-17 RX ADMIN — FUROSEMIDE 20 MG: 20 TABLET ORAL at 09:21

## 2018-01-17 RX ADMIN — LISINOPRIL 2.5 MG: 2.5 TABLET ORAL at 09:21

## 2018-01-17 RX ADMIN — ACETAMINOPHEN 1000 MG: 500 TABLET, FILM COATED ORAL at 21:30

## 2018-01-17 RX ADMIN — ACETAMINOPHEN 1000 MG: 500 TABLET, FILM COATED ORAL at 16:04

## 2018-01-17 NOTE — PROGRESS NOTES
Oriented to self only, forgetful with baseline confusion. VSS on RA. Tele sinus dysrhythmia. ANGELO. Inspiratory wheezing at times. 1+ edema BLEs. C/o nausea and epigastric discomfot with dry heaves this morning, improved with GI cocktail. Appetite poor on cardiac diet. Up with Ax1 and walker. Plan d/c back to facility in 1-2 days.

## 2018-01-17 NOTE — PROGRESS NOTES
[facility]    Hospitalist Progress Note      Assessment & Plan   Olga Zelaya is a 89 year old female who was admitted on 1/13/2018.  Past history of dementia, DM II, HTN, hyperlipidemia, hypothyroidism who presents with chest pain and dyspnea, found to have NSTEMI and possible PNA.    NSTEMI:  Admission EKG without ischemic changes, serial troponin mildly elevated but flat.  HDL 61, LDL 95.  Managed medically per family wishes, completed 48-hours heparin gtt on 1/15.  TTE 1/14 with EF 25-30% with anteroapical and inferolateral hypokinesis (new from TTE 9/2015 in [facility]).  Cardiology signed off.  - continue aspirin, pravastatin, lisinopril,  - decrease metoprolol to 6.25 mg bid and increase Imdur to 30 mg daily as complaining of some chest pressure and BP intermittently low   - increased dyspnea today, add-on Pro-BNP and start lasix 20 mg po daily    CAP, possible lung nodules: Admission CT chest with patchy nodular opacities, primarily of RLL, in addition to some mediastinal LAD.  No fever or leukocytosis.  - initiated on 10-day course levofloxacin 1/13  - follow CT as outpatient in 3 months  - quite wheezy on exam 1/17; continue scheduled nebs, add prn nebs  - check influenza swab    Epigastric pain:  On 1/16 complaining of epigastric discomfort reproducible on palpation.  LFT's and lipase 1/16 wnl.  Symptoms seemed to improved with GI cocktail.  - no further abdominal complaints 1/17    DM II:  Diet controlled.  A1C 6.9% this admission.    Chronic anemia:  Baseline hgb 10-11 g/dL, stable.    Hypothyroidism:  Continue PTA Synthroid.    Dementia:  Monitor for delirium.    DVT Prophylaxis: Pneumatic Compression Devices  Code Status: DNR/DNI    Disposition: Expected discharge pending improvement in respiratory status, med optimization.  ?2 days    Owen Melara    Interval History   Patient reports increased dyspnea this morning, feels wheezy, also endorsing orthopnea.  Mild substernal  chest pressure.  No fever/chills, headache, sore throat, myalgias, arthralgias.    -Data reviewed today: I reviewed all new labs and imaging results over the last 24 hours. I personally reviewed no images or EKG's today.    Physical Exam   Temp: 98.5  F (36.9  C) Temp src: Oral BP: 145/81   Heart Rate: 115 Resp: 17 SpO2: 94 % O2 Device: Nasal cannula Oxygen Delivery: 3 LPM  Vitals:    01/15/18 0330 01/16/18 0500 01/17/18 0300   Weight: 64.5 kg (142 lb 3.2 oz) 64.2 kg (141 lb 9.6 oz) 63.9 kg (140 lb 12.8 oz)     Vital Signs with Ranges  Temp:  [97.9  F (36.6  C)-98.5  F (36.9  C)] 98.5  F (36.9  C)  Heart Rate:  [] 115  Resp:  [17-18] 17  BP: ()/(50-81) 145/81  SpO2:  [88 %-94 %] 94 %  I/O last 3 completed shifts:  In: 540 [P.O.:540]  Out: 250 [Urine:250]    Constitutional: Well developed, well nourished elderly female appearing short of breath  Respiratory: Moderate expiratory wheezing bilaterally with scattered basilar crackles, mild tachypnea  Cardiovascular: regular rate and rhythm, normal S1/S2 without murmur, rubs or gallops, trace peripheral edema  GI: abdomen soft, nontender, non-distended, normal bowel sounds  Skin/Integumen:    Other:  alert, confused, cranial nerves grossly intact    Medications     - MEDICATION INSTRUCTIONS -       - MEDICATION INSTRUCTIONS -       Reason ACE/ARB/ARNI order not selected         metoprolol tartrate  6.25 mg Oral BID     isosorbide mononitrate  30 mg Oral Daily     furosemide  20 mg Oral Daily     lisinopril  2.5 mg Oral Daily     albuterol  2.5 mg Nebulization Q6H WA     levofloxacin  250 mg Oral Daily     acetaminophen (TYLENOL) tablet 1,000 mg  1,000 mg Oral TID     levothyroxine (SYNTHROID/LEVOTHROID) tablet 88 mcg  88 mcg Oral Daily     sodium chloride (PF)  3 mL Intracatheter Q8H     aspirin  324 mg Oral Once     pravastatin  40 mg Oral QPM     aspirin  81 mg Oral Daily       Data     Recent Labs  Lab 01/17/18  0539 01/16/18  0626 01/15/18  0550   01/13/18  2153 01/13/18  1745 01/13/18  1430 01/13/18  0635   WBC  --  5.3 3.8*  --   --   --   --  5.7   HGB  --  10.3* 10.4*  --   --   --   --  11.9   MCV  --  95 96  --   --   --   --  94   PLT  --  206 232  --   --   --   --  254   * 136 135  < >  --   --   --  136   POTASSIUM 4.2 4.1 4.0  < >  --   --   --  3.8   CHLORIDE 96 98 99  < >  --   --   --  100   CO2 32 33* 31  < >  --   --   --  27   BUN 21 23 22  < >  --   --   --  20   CR 1.07* 1.13* 1.07*  < >  --   --   --  0.98   ANIONGAP 4 5 5  < >  --   --   --  9   RADHA 8.8 8.5 8.5  < >  --   --   --  9.1   * 146* 114*  < >  --   --   --  127*   ALBUMIN  --  3.0*  --   --   --   --   --   --    PROTTOTAL  --  6.5*  --   --   --   --   --   --    BILITOTAL  --  0.5  --   --   --   --   --   --    ALKPHOS  --  48  --   --   --   --   --   --    ALT  --  12  --   --   --   --   --   --    AST  --  15  --   --   --   --   --   --    LIPASE  --  98  --   --   --   --   --   --    TROPI  --   --   --   --  0.060* 0.059* 0.060* 0.069*   < > = values in this interval not displayed.    No results found for this or any previous visit (from the past 24 hour(s)).

## 2018-01-17 NOTE — PLAN OF CARE
Problem: Patient Care Overview  Goal: Plan of Care/Patient Progress Review  OT/CR: pt sleeping soundly, difficult to awake. Nursing reported pt had just fallen asleep after being up most of night and recommended she sleep. Will reschedule.

## 2018-01-17 NOTE — PROGRESS NOTES
SPIRITUAL [facility] Progress Note  [facility] 66    SH, referral visit. The patient reportedly does not hear well and the surgical mask precaution made it even more difficult. The patient mentioned that she does not feel good and mentioned no particular pain, just doesn't feel like herself. The patient asked for prayer.      provided care in presence and prayed.      SH will follow as LOS persists.        Toney Torres  Chaplain Resident

## 2018-01-17 NOTE — PLAN OF CARE
Problem: Patient Care Overview  Goal: Plan of Care/Patient Progress Review  Outcome: No Change  Nursing note  Pt alert to self only, up with assist of 1 to the BR voiding fine. Pt denies any pain. C/o generalized weakness. Pt was very anxious this morning, having difficulty breathing, c/o SOB/ANGELO on 2 liters of 02. Scheduled neb was given by RT, MD was notified. Pt c/o nausea, Zofran given. Tele is NSR. BLE's trace edema. Lung sounds crackles, expiratory/inspiratory wheezes. Productive frequent cough.Will continue to monitor.

## 2018-01-17 NOTE — PLAN OF CARE
Problem: Patient Care Overview  Goal: Plan of Care/Patient Progress Review  Alert to self only. Up with 1 and walker. VSS, on 2L O2 overnight. Pt gets very SOB with exertion. Tele shows SR. Frequent urination. Will continue to monitor.

## 2018-01-18 ENCOUNTER — APPOINTMENT (OUTPATIENT)
Dept: OCCUPATIONAL THERAPY | Facility: CLINIC | Age: 83
DRG: 280 | End: 2018-01-18
Payer: MEDICARE

## 2018-01-18 ENCOUNTER — APPOINTMENT (OUTPATIENT)
Dept: GENERAL RADIOLOGY | Facility: CLINIC | Age: 83
DRG: 280 | End: 2018-01-18
Attending: HOSPITALIST
Payer: MEDICARE

## 2018-01-18 ENCOUNTER — APPOINTMENT (OUTPATIENT)
Dept: SPEECH THERAPY | Facility: CLINIC | Age: 83
DRG: 280 | End: 2018-01-18
Attending: HOSPITALIST
Payer: MEDICARE

## 2018-01-18 LAB
ANION GAP SERPL CALCULATED.3IONS-SCNC: 6 MMOL/L (ref 3–14)
BUN SERPL-MCNC: 28 MG/DL (ref 7–30)
CALCIUM SERPL-MCNC: 8.4 MG/DL (ref 8.5–10.1)
CHLORIDE SERPL-SCNC: 97 MMOL/L (ref 94–109)
CO2 SERPL-SCNC: 31 MMOL/L (ref 20–32)
CREAT SERPL-MCNC: 1.26 MG/DL (ref 0.52–1.04)
ERYTHROCYTE [DISTWIDTH] IN BLOOD BY AUTOMATED COUNT: 13.8 % (ref 10–15)
GFR SERPL CREATININE-BSD FRML MDRD: 40 ML/MIN/1.7M2
GLUCOSE SERPL-MCNC: 114 MG/DL (ref 70–99)
HCT VFR BLD AUTO: 32.1 % (ref 35–47)
HGB BLD-MCNC: 10.4 G/DL (ref 11.7–15.7)
INTERPRETATION ECG - MUSE: NORMAL
LACTATE BLD-SCNC: 0.7 MMOL/L (ref 0.7–2)
MCH RBC QN AUTO: 30.6 PG (ref 26.5–33)
MCHC RBC AUTO-ENTMCNC: 32.4 G/DL (ref 31.5–36.5)
MCV RBC AUTO: 94 FL (ref 78–100)
PLATELET # BLD AUTO: 198 10E9/L (ref 150–450)
POTASSIUM SERPL-SCNC: 4.4 MMOL/L (ref 3.4–5.3)
PROCALCITONIN SERPL-MCNC: 0.15 NG/ML
RBC # BLD AUTO: 3.4 10E12/L (ref 3.8–5.2)
SODIUM SERPL-SCNC: 134 MMOL/L (ref 133–144)
TROPONIN I SERPL-MCNC: 0.04 UG/L (ref 0–0.04)
WBC # BLD AUTO: 4.1 10E9/L (ref 4–11)

## 2018-01-18 PROCEDURE — 99233 SBSQ HOSP IP/OBS HIGH 50: CPT | Performed by: HOSPITALIST

## 2018-01-18 PROCEDURE — 25000132 ZZH RX MED GY IP 250 OP 250 PS 637: Mod: GY | Performed by: HOSPITALIST

## 2018-01-18 PROCEDURE — 25000128 H RX IP 250 OP 636: Performed by: INTERNAL MEDICINE

## 2018-01-18 PROCEDURE — 94640 AIRWAY INHALATION TREATMENT: CPT | Mod: 76

## 2018-01-18 PROCEDURE — 80048 BASIC METABOLIC PNL TOTAL CA: CPT | Performed by: HOSPITALIST

## 2018-01-18 PROCEDURE — 94640 AIRWAY INHALATION TREATMENT: CPT

## 2018-01-18 PROCEDURE — A9270 NON-COVERED ITEM OR SERVICE: HCPCS | Mod: GY | Performed by: INTERNAL MEDICINE

## 2018-01-18 PROCEDURE — 25000125 ZZHC RX 250: Performed by: INTERNAL MEDICINE

## 2018-01-18 PROCEDURE — 40000225 ZZH STATISTIC SLP WARD VISIT: Performed by: SPEECH-LANGUAGE PATHOLOGIST

## 2018-01-18 PROCEDURE — 36415 COLL VENOUS BLD VENIPUNCTURE: CPT | Performed by: HOSPITALIST

## 2018-01-18 PROCEDURE — 21000001 ZZH R&B HEART CARE

## 2018-01-18 PROCEDURE — 27210995 ZZH RX 272: Performed by: HOSPITALIST

## 2018-01-18 PROCEDURE — 40000133 ZZH STATISTIC OT WARD VISIT: Performed by: OCCUPATIONAL THERAPIST

## 2018-01-18 PROCEDURE — 85027 COMPLETE CBC AUTOMATED: CPT | Performed by: HOSPITALIST

## 2018-01-18 PROCEDURE — 84145 PROCALCITONIN (PCT): CPT | Performed by: HOSPITALIST

## 2018-01-18 PROCEDURE — 71046 X-RAY EXAM CHEST 2 VIEWS: CPT

## 2018-01-18 PROCEDURE — 25000132 ZZH RX MED GY IP 250 OP 250 PS 637: Mod: GY | Performed by: INTERNAL MEDICINE

## 2018-01-18 PROCEDURE — 83605 ASSAY OF LACTIC ACID: CPT | Performed by: HOSPITALIST

## 2018-01-18 PROCEDURE — 40000275 ZZH STATISTIC RCP TIME EA 10 MIN

## 2018-01-18 PROCEDURE — A9270 NON-COVERED ITEM OR SERVICE: HCPCS | Mod: GY | Performed by: HOSPITALIST

## 2018-01-18 PROCEDURE — 25000128 H RX IP 250 OP 636: Performed by: HOSPITALIST

## 2018-01-18 PROCEDURE — 25000125 ZZHC RX 250: Performed by: HOSPITALIST

## 2018-01-18 PROCEDURE — 97535 SELF CARE MNGMENT TRAINING: CPT | Mod: GO | Performed by: OCCUPATIONAL THERAPIST

## 2018-01-18 PROCEDURE — 93005 ELECTROCARDIOGRAM TRACING: CPT

## 2018-01-18 PROCEDURE — 84484 ASSAY OF TROPONIN QUANT: CPT | Performed by: HOSPITALIST

## 2018-01-18 PROCEDURE — 93010 ELECTROCARDIOGRAM REPORT: CPT | Performed by: INTERNAL MEDICINE

## 2018-01-18 PROCEDURE — 92610 EVALUATE SWALLOWING FUNCTION: CPT | Mod: GN | Performed by: SPEECH-LANGUAGE PATHOLOGIST

## 2018-01-18 RX ORDER — FUROSEMIDE 20 MG/1
10 TABLET ORAL DAILY
Status: DISCONTINUED | OUTPATIENT
Start: 2018-01-18 | End: 2018-01-21 | Stop reason: HOSPADM

## 2018-01-18 RX ADMIN — LIDOCAINE HYDROCHLORIDE 30 ML: 20 SOLUTION ORAL; TOPICAL at 07:44

## 2018-01-18 RX ADMIN — ASPIRIN 81 MG 81 MG: 81 TABLET ORAL at 09:02

## 2018-01-18 RX ADMIN — ONDANSETRON 4 MG: 2 SOLUTION INTRAMUSCULAR; INTRAVENOUS at 06:43

## 2018-01-18 RX ADMIN — ALBUTEROL SULFATE 2.5 MG: 2.5 SOLUTION RESPIRATORY (INHALATION) at 19:27

## 2018-01-18 RX ADMIN — Medication 10 MG: at 11:09

## 2018-01-18 RX ADMIN — ACETAMINOPHEN 1000 MG: 500 TABLET, FILM COATED ORAL at 17:40

## 2018-01-18 RX ADMIN — ALBUTEROL SULFATE 2.5 MG: 2.5 SOLUTION RESPIRATORY (INHALATION) at 12:47

## 2018-01-18 RX ADMIN — LEVOTHYROXINE SODIUM 88 MCG: 88 TABLET ORAL at 09:02

## 2018-01-18 RX ADMIN — ACETAMINOPHEN 1000 MG: 500 TABLET, FILM COATED ORAL at 07:13

## 2018-01-18 RX ADMIN — ONDANSETRON 4 MG: 4 TABLET, ORALLY DISINTEGRATING ORAL at 12:44

## 2018-01-18 RX ADMIN — ALUMINUM HYDROXIDE, MAGNESIUM HYDROXIDE, AND DIMETHICONE 30 ML: 400; 400; 40 SUSPENSION ORAL at 06:08

## 2018-01-18 RX ADMIN — ONDANSETRON 4 MG: 4 TABLET, ORALLY DISINTEGRATING ORAL at 20:46

## 2018-01-18 RX ADMIN — ALBUTEROL SULFATE 2.5 MG: 2.5 SOLUTION RESPIRATORY (INHALATION) at 06:52

## 2018-01-18 RX ADMIN — LISINOPRIL 2.5 MG: 2.5 TABLET ORAL at 09:02

## 2018-01-18 RX ADMIN — Medication 15 MG: at 11:09

## 2018-01-18 RX ADMIN — Medication 15 MG: at 20:18

## 2018-01-18 RX ADMIN — PRAVASTATIN SODIUM 40 MG: 40 TABLET ORAL at 20:18

## 2018-01-18 RX ADMIN — Medication 6.25 MG: at 09:02

## 2018-01-18 RX ADMIN — WATER 500 MG: 1 INJECTION INTRAMUSCULAR; INTRAVENOUS; SUBCUTANEOUS at 10:27

## 2018-01-18 RX ADMIN — NITROGLYCERIN 0.4 MG: 0.4 TABLET SUBLINGUAL at 06:30

## 2018-01-18 RX ADMIN — Medication 6.25 MG: at 20:18

## 2018-01-18 RX ADMIN — ACETAMINOPHEN 1000 MG: 500 TABLET, FILM COATED ORAL at 21:02

## 2018-01-18 ASSESSMENT — PAIN DESCRIPTION - DESCRIPTORS: DESCRIPTORS: DISCOMFORT

## 2018-01-18 NOTE — PROGRESS NOTES
Okay from Daughter Linh that if the patients granddaughter calls, its okay for staff to talk to her. The Granddaughters name is Myrna.

## 2018-01-18 NOTE — PLAN OF CARE
"Problem: Patient Care Overview  Goal: Plan of Care/Patient Progress Review  Discharge Planner OT   Patient plan for discharge: none stated.   Current status: Upon OT arrival, pt reports finished eating lunch and appeared to not eat much, reports nausea, \"hard time breathing\" and fatigued. Pt declined any ambulation or grooming tasks, O2 sats 92% RA, donned 2 L O2 and transferred back to EOB with CGA and ww and cues for safety and MIN A for sit to supine, nursing notified regarding SOB, nausea, etc. Encourage nursing to walk with pt 2-3x/day. Will reduce OT/CR to 5x/wk due to pt's activity tolerance.   Barriers to return to prior living situation: decreased activity tolerance, cognition/safety, balance, strength.  Recommendations for discharge: return to memory care withcont'd A with ADL/IADL's  Rationale for recommendations: cont'd A with ADL/IADL's to insure safety.       Entered by: Lorene Mendez 01/18/2018 4:20 PM           "

## 2018-01-18 NOTE — PLAN OF CARE
Problem: Patient Care Overview  Goal: Plan of Care/Patient Progress Review  Outcome: No Change  [id number]: Pt alert to self and time. Up w/1. Impulsive; does not use call light. Very [place]. Negative influenza. Insp wheezing, ANGELO, nonproductive cough. Soft BP s. 2L O2 NC. Very poor appetite. Ate less than 10% of dinner and declined snack food available from floor stock.

## 2018-01-18 NOTE — PROVIDER NOTIFICATION
MD Notification    Notified Person:  MD    Notified Persons Name:Elise    Notification Date/Time:0715    Notification Interaction:  Talked with Physician    Purpose of Notification: Patient having pain belt pain under breast area at first and now patient points to epigastric area but denies stomach upset.      Orders Received: Chest xray, GI cocktail,  Speech evaluation    Comments:  Lungs sounds crackles at the bases and expiratory wheezes . Wheezes better with Neb.  Nitro given x1 with no relief and Neb was given. Maalox  was given with no relief. Tylenol  was given. Also patient is belching after taking sips of water, recommend a speech evaluation.

## 2018-01-18 NOTE — PROGRESS NOTES
[facility]    Hospitalist Progress Note      Assessment & Plan   Olga Zelaya is a 89 year old female who was admitted on 1/13/2018.  Past history of dementia, DM II, HTN, hyperlipidemia, hypothyroidism who presents with chest pain and dyspnea, found to have NSTEMI and possible PNA.    NSTEMI:  Admission EKG without ischemic changes, serial troponin mildly elevated but flat.  HDL 61, LDL 95.  Managed medically per family wishes, completed 48-hours heparin gtt on 1/15.  TTE 1/14 with EF 25-30% with anteroapical and inferolateral hypokinesis (new from TTE 9/2015 in [facility]).  Cardiology signed off.  - continue aspirin, pravastatin, lisinopril, metoprolol  - Imdur increased to 30 mg daily 1/17 as continuing to report chest pressure (will switch to 15 mg bid as having intermittent hypotension and more frequently reporting pressure in AM prior to meds)  - lasix resumed at 20 mg po daily on 1/17 with increase in Cr 1/18, will decrease to 10 mg daily and monitor BMP  - will repeat EKG and add-on trop given persistent reports of chest pressure  ADDENDUM:  EKG unchanged and troponin wnl.    CAP, possible lung nodules: Admission CT chest with patchy nodular opacities, primarily of RLL, in addition to some mediastinal LAD.  No fever or leukocytosis.  Initiated on levofloxacin 1/13.  Flu swab negative 1/17.  - follow up CT as outpatient in 3 months  - continues to report dyspnea though does not appear volume overloaded  - CXR 1/18 with right lower lobe infiltrates consistent with prior CT chest, formal read pending  - slight increase in fever curve (is on scheduled tylenol) and oxygen requirements past 24 hours; will switch levo to ertapenem (PCN allergy) and check procalcitonin    Epigastric pain:  On 1/16 complaining of epigastric discomfort reproducible on palpation.  LFT's and lipase 1/16 wnl.    - unclear if significant improvement with GI cocktail, does not appear to respond to nitro; am  concerned this represents anginal equivalent    DM II:  Diet controlled.  A1C 6.9% this admission.    Chronic anemia:  Baseline hgb 10-11 g/dL, stable.    Hypothyroidism:  Continue PTA Synthroid.    Dementia:  Monitor for delirium.    DVT Prophylaxis: Pneumatic Compression Devices  Code Status: DNR/DNI    Disposition: Expected discharge pending improvement in respiratory status, med optimization.  ?2 days    Updated daughter, Linh Welsh, via phone on 1/18.  Discussed that we have not seen any improvement in respiratory or cardiac complaints despite medical therapy.  Discussed the small tweaks to meds and antibiotic change made today, but raised my concern that if we do not see improvement soon, we may not see any at all.  Raised the prospect of transition to comfort care and focusing on symptoms.  Daughter reports her mother has stated in the past she would not want recurrent hospitalization or aggressive treatments and daughter reports her mother has lost interest in things she normally enjoys (puzzles).  She agrees with the changes made today, but if we are not seeing improvement in the near future, would likely favor a comfort-focused approach.  She will contact her sister who lives in [facility] to discuss this.    Owen Melara    Interval History   Called early this morning for report of epigastric discomfort and dyspnea which did not respond to albuterol or nitro.  Upon assessment, patient reports substernal pressure which is persistent, mild dyspnea and general fatigue.  No complaints of cough or fever/chills, however, her memory is quite poor.    -Data reviewed today: I reviewed all new labs and imaging results over the last 24 hours. I personally reviewed no images or EKG's today.    Physical Exam   Temp: 99.2  F (37.3  C) Temp src: Oral BP: 109/57   Heart Rate: 99 Resp: 24 SpO2: 92 % O2 Device: Nasal cannula Oxygen Delivery: 3 LPM  Vitals:    01/16/18 0500 01/17/18 0300 01/18/18 0455   Weight: 64.2 kg (141  lb 9.6 oz) 63.9 kg (140 lb 12.8 oz) 64 kg (141 lb 3.2 oz)     Vital Signs with Ranges  Temp:  [97.8  F (36.6  C)-99.2  F (37.3  C)] 99.2  F (37.3  C)  Heart Rate:  [] 99  Resp:  [18-24] 24  BP: ()/(47-74) 109/57  SpO2:  [91 %-96 %] 92 %  I/O last 3 completed shifts:  In: 700 [P.O.:700]  Out: 275 [Urine:275]    Constitutional: Well developed, well nourished elderly female, sitting up in chair in no acute distress  Respiratory: Mild wheezing bilaterally, crackles seem more distributed today, no tachypnea  Cardiovascular: regular rate and rhythm, normal S1/S2 without murmur, rubs or gallops  GI: abdomen soft, nontender, non-distended, normal bowel sounds  Lymph:  No peripheral edema  Other:  alert, confused, cranial nerves grossly intact    Medications     - MEDICATION INSTRUCTIONS -       - MEDICATION INSTRUCTIONS -       Reason ACE/ARB/ARNI order not selected         furosemide  10 mg Oral Daily     ertapenem (INVanz) IV  500 mg Intravenous Q24H     metoprolol tartrate  6.25 mg Oral BID     isosorbide mononitrate  30 mg Oral Daily     lisinopril  2.5 mg Oral Daily     albuterol  2.5 mg Nebulization Q6H WA     acetaminophen (TYLENOL) tablet 1,000 mg  1,000 mg Oral TID     levothyroxine (SYNTHROID/LEVOTHROID) tablet 88 mcg  88 mcg Oral Daily     sodium chloride (PF)  3 mL Intracatheter Q8H     aspirin  324 mg Oral Once     pravastatin  40 mg Oral QPM     aspirin  81 mg Oral Daily       Data     Recent Labs  Lab 01/18/18  0550 01/17/18  0539 01/16/18  0626 01/15/18  0550  01/13/18  2153 01/13/18  1745 01/13/18  1430   WBC 4.1  --  5.3 3.8*  --   --   --   --    HGB 10.4*  --  10.3* 10.4*  --   --   --   --    MCV 94  --  95 96  --   --   --   --      --  206 232  --   --   --   --     132* 136 135  < >  --   --   --    POTASSIUM 4.4 4.2 4.1 4.0  < >  --   --   --    CHLORIDE 97 96 98 99  < >  --   --   --    CO2 31 32 33* 31  < >  --   --   --    BUN 28 21 23 22  < >  --   --   --    CR 1.26*  1.07* 1.13* 1.07*  < >  --   --   --    ANIONGAP 6 4 5 5  < >  --   --   --    RADHA 8.4* 8.8 8.5 8.5  < >  --   --   --    * 130* 146* 114*  < >  --   --   --    ALBUMIN  --   --  3.0*  --   --   --   --   --    PROTTOTAL  --   --  6.5*  --   --   --   --   --    BILITOTAL  --   --  0.5  --   --   --   --   --    ALKPHOS  --   --  48  --   --   --   --   --    ALT  --   --  12  --   --   --   --   --    AST  --   --  15  --   --   --   --   --    LIPASE  --   --  98  --   --   --   --   --    TROPI  --   --   --   --   --  0.060* 0.059* 0.060*   < > = values in this interval not displayed.    No results found for this or any previous visit (from the past 24 hour(s)).

## 2018-01-18 NOTE — PLAN OF CARE
Problem: Patient Care Overview  Goal: Plan of Care/Patient Progress Review  Outcome: No Change  Alert to self at baseline. BP systolic in 100s. Tele SR. C/o epigastric pain. Maalox given with no results. Nitro given x1. Pt up with 1 assist to BR. Impulsive. Oral fluids encouraged. Bladder scanned for 87 and 230.

## 2018-01-18 NOTE — PLAN OF CARE
Problem: Patient Care Overview  Goal: Plan of Care/Patient Progress Review  SLP: ST ordered due to belching with thin liquids. ST evaluation attempted, however, pt reported epigastric pain and adamantly refused PO at this time. RN aware. RN reported pt tolerating diet this am and few amounts at lunch. Will re-attempt this pm pending pt participation and family discussion on goals of care.

## 2018-01-18 NOTE — PROGRESS NOTES
01/18/18 1552   General Information   Onset Date 01/13/18   Start of Care Date 01/18/18   Referring Physician Dr. Melara   Patient Profile Review/OT: Additional Occupational Profile Info See Profile for full history and prior level of function   Patient/Family Goals Statement did not state   Swallowing Evaluation Bedside swallow evaluation   Behaviorial Observations Alert;Confused;Distractible   Mode of current nutrition Oral diet   Type of oral diet Regular;Thin liquid   Respiratory Status O2 Supply   Type of O2 supply Nasal cannula   Comments Olga Zelaya is a 89 year old female who was admitted on 1/13/2018.  Past history of dementia, DM II, HTN, hyperlipidemia, hypothyroidism who presents with chest pain and dyspnea, found to have NSTEMI and possible PNA.   Clinical Swallow Evaluation   Oral Musculature unable to assess due to poor participation/comprehension   Oral Musculature Comments unable to follow commands or case history questions   Additional Documentation Yes   Clinical Swallow Eval: Thin Liquid Texture Trial   Mode of Presentation, Thin Liquids cup;self-fed   Oral Phase of Swallow WFL   Pharyngeal Phase of Swallow intact   Diagnostic Statement no overt s/sx of aspiration   Clinical Swallow Eval: Puree Solid Texture Trial   Mode of Presentation, Puree spoon;self-fed   Oral Phase, Puree WFL   Pharyngeal Phase, Puree intact   Diagnostic Statement no overt s/sx of aspiration   Clinical Swallow Eval: Solid Food Texture Trial   Mode of Presentation, Solid self-fed   Oral Phase, Solid WFL   Pharyngeal Phase, Solid intact   Diagnostic Statement no overt s/sx of aspiration   Swallow Eval: Clinical Impressions   Skilled Criteria for Therapy Intervention No problems identified which require skilled intervention   Functional Assessment Scale (FAS) 7   Diet texture recommendations Regular diet;Thin liquids   Recommended Feeding/Eating Techniques alternate between small bites and sips of food/liquid;check  mouth frequently for oral residue/pocketing;hard swallow w/ each bite or sip;small sips/bites   Anticipated Discharge Disposition extended care facility   Risks and Benefits of Treatment have been explained. Yes   Patient, family and/or staff in agreement with Plan of Care Yes   Clinical Impression Comments Pt presents with no appreciable dysphagia on limited clinical swallow evaluation. Pt unable to follow commands for oral motor exam or provide responses to history questions. Pt upright in a chair for exam. Pt drinking thin liquids via cup and agreeable to trial small amounts of puree solids and regular texture crackers. Adequate oral phase with no overt s/sx of aspiration throughout. Pt reported continued discomfort and requested to hold PO. Recommended: continue regular textures with thin liquids with pt upright in a chair for meals. No further ST need identified at this time. Will sign off.    Total Evaluation Time   Total Evaluation Time (Minutes) 13

## 2018-01-18 NOTE — PLAN OF CARE
Problem: Patient Care Overview  Goal: Plan of Care/Patient Progress Review  Discharge Planner SLP   Patient plan for discharge: Did not state  Current status: Pt presents with no appreciable dysphagia on limited clinical swallow evaluation. Pt unable to follow commands for oral motor exam or provide responses to history questions. Pt upright in a chair for exam. Pt drinking thin liquids via cup and agreeable to trial small amounts of puree solids and regular texture crackers. Adequate oral phase with no overt s/sx of aspiration throughout. Pt reported continued discomfort and requested to hold PO. Cough noted several minutes follow intake. Suspect possible esophageal dysfunction. GI consult may be indicated. Recommended: continue regular textures with thin liquids with pt upright in a chair for meals. No further ST need identified at this time. Will sign off.   Barriers to return to prior living situation: None  Recommendations for discharge: Per PT/OT  Rationale for recommendations: No further ST services; please re-consult if changes or difficulty noted with oral/pharyngeal phase       Entered by: Lety Camargo 01/18/2018 3:58 PM

## 2018-01-19 ENCOUNTER — APPOINTMENT (OUTPATIENT)
Dept: OCCUPATIONAL THERAPY | Facility: CLINIC | Age: 83
DRG: 280 | End: 2018-01-19
Payer: MEDICARE

## 2018-01-19 LAB
ANION GAP SERPL CALCULATED.3IONS-SCNC: 7 MMOL/L (ref 3–14)
BUN SERPL-MCNC: 32 MG/DL (ref 7–30)
CALCIUM SERPL-MCNC: 8.6 MG/DL (ref 8.5–10.1)
CHLORIDE SERPL-SCNC: 99 MMOL/L (ref 94–109)
CO2 SERPL-SCNC: 30 MMOL/L (ref 20–32)
CREAT SERPL-MCNC: 1.23 MG/DL (ref 0.52–1.04)
GFR SERPL CREATININE-BSD FRML MDRD: 41 ML/MIN/1.7M2
GLUCOSE SERPL-MCNC: 104 MG/DL (ref 70–99)
LACTATE BLD-SCNC: 1 MMOL/L (ref 0.7–2)
POTASSIUM SERPL-SCNC: 4.3 MMOL/L (ref 3.4–5.3)
SODIUM SERPL-SCNC: 136 MMOL/L (ref 133–144)

## 2018-01-19 PROCEDURE — 12000000 ZZH R&B MED SURG/OB

## 2018-01-19 PROCEDURE — 25000132 ZZH RX MED GY IP 250 OP 250 PS 637: Mod: GY | Performed by: INTERNAL MEDICINE

## 2018-01-19 PROCEDURE — 99207 ZZC CDG-MDM COMPONENT: MEETS LOW - DOWN CODED: CPT | Performed by: HOSPITALIST

## 2018-01-19 PROCEDURE — A9270 NON-COVERED ITEM OR SERVICE: HCPCS | Mod: GY | Performed by: INTERNAL MEDICINE

## 2018-01-19 PROCEDURE — 25000125 ZZHC RX 250: Performed by: HOSPITALIST

## 2018-01-19 PROCEDURE — 25000132 ZZH RX MED GY IP 250 OP 250 PS 637: Mod: GY | Performed by: HOSPITALIST

## 2018-01-19 PROCEDURE — 80048 BASIC METABOLIC PNL TOTAL CA: CPT | Performed by: HOSPITALIST

## 2018-01-19 PROCEDURE — 99232 SBSQ HOSP IP/OBS MODERATE 35: CPT | Performed by: HOSPITALIST

## 2018-01-19 PROCEDURE — 94640 AIRWAY INHALATION TREATMENT: CPT | Mod: 76

## 2018-01-19 PROCEDURE — 97110 THERAPEUTIC EXERCISES: CPT | Mod: GO | Performed by: OCCUPATIONAL THERAPIST

## 2018-01-19 PROCEDURE — 40000133 ZZH STATISTIC OT WARD VISIT: Performed by: OCCUPATIONAL THERAPIST

## 2018-01-19 PROCEDURE — 93005 ELECTROCARDIOGRAM TRACING: CPT

## 2018-01-19 PROCEDURE — 83605 ASSAY OF LACTIC ACID: CPT | Performed by: HOSPITALIST

## 2018-01-19 PROCEDURE — A9270 NON-COVERED ITEM OR SERVICE: HCPCS | Mod: GY | Performed by: HOSPITALIST

## 2018-01-19 PROCEDURE — 36415 COLL VENOUS BLD VENIPUNCTURE: CPT | Performed by: HOSPITALIST

## 2018-01-19 PROCEDURE — 21000001 ZZH R&B HEART CARE

## 2018-01-19 PROCEDURE — 40000275 ZZH STATISTIC RCP TIME EA 10 MIN

## 2018-01-19 PROCEDURE — 93010 ELECTROCARDIOGRAM REPORT: CPT | Performed by: INTERNAL MEDICINE

## 2018-01-19 PROCEDURE — 94640 AIRWAY INHALATION TREATMENT: CPT

## 2018-01-19 RX ORDER — LEVOFLOXACIN 250 MG/1
250 TABLET, FILM COATED ORAL DAILY
Status: DISCONTINUED | OUTPATIENT
Start: 2018-01-19 | End: 2018-01-21 | Stop reason: HOSPADM

## 2018-01-19 RX ORDER — GUAIFENESIN/DEXTROMETHORPHAN 100-10MG/5
5 SYRUP ORAL EVERY 4 HOURS PRN
Status: DISCONTINUED | OUTPATIENT
Start: 2018-01-19 | End: 2018-01-21 | Stop reason: HOSPADM

## 2018-01-19 RX ADMIN — ACETAMINOPHEN 1000 MG: 500 TABLET, FILM COATED ORAL at 17:19

## 2018-01-19 RX ADMIN — ALBUTEROL SULFATE 2.5 MG: 2.5 SOLUTION RESPIRATORY (INHALATION) at 13:52

## 2018-01-19 RX ADMIN — ALBUTEROL SULFATE 2.5 MG: 2.5 SOLUTION RESPIRATORY (INHALATION) at 07:05

## 2018-01-19 RX ADMIN — OMEPRAZOLE 40 MG: 20 CAPSULE, DELAYED RELEASE ORAL at 11:23

## 2018-01-19 RX ADMIN — Medication 6.25 MG: at 09:27

## 2018-01-19 RX ADMIN — LEVOFLOXACIN 250 MG: 250 TABLET, FILM COATED ORAL at 11:24

## 2018-01-19 RX ADMIN — Medication 15 MG: at 20:28

## 2018-01-19 RX ADMIN — Medication 10 MG: at 09:26

## 2018-01-19 RX ADMIN — ACETAMINOPHEN 1000 MG: 500 TABLET, FILM COATED ORAL at 09:27

## 2018-01-19 RX ADMIN — PRAVASTATIN SODIUM 40 MG: 40 TABLET ORAL at 19:37

## 2018-01-19 RX ADMIN — LISINOPRIL 2.5 MG: 2.5 TABLET ORAL at 09:26

## 2018-01-19 RX ADMIN — Medication 6.25 MG: at 20:28

## 2018-01-19 RX ADMIN — Medication 15 MG: at 09:26

## 2018-01-19 RX ADMIN — ALBUTEROL SULFATE 2.5 MG: 2.5 SOLUTION RESPIRATORY (INHALATION) at 20:19

## 2018-01-19 RX ADMIN — ASPIRIN 81 MG 81 MG: 81 TABLET ORAL at 09:27

## 2018-01-19 RX ADMIN — LEVOTHYROXINE SODIUM 88 MCG: 88 TABLET ORAL at 09:27

## 2018-01-19 ASSESSMENT — PAIN DESCRIPTION - DESCRIPTORS: DESCRIPTORS: ACHING

## 2018-01-19 NOTE — PROGRESS NOTES
[facility]    Hospitalist Progress Note      Assessment & Plan   Olga Zelaya is a 89 year old female who was admitted on 1/13/2018.  Past history of dementia, DM II, HTN, hyperlipidemia, hypothyroidism who presents with chest pain and dyspnea, found to have NSTEMI and possible PNA.    NSTEMI:  Admission EKG without ischemic changes, serial troponin mildly elevated but flat.  HDL 61, LDL 95.  Managed medically per family wishes, completed 48-hours heparin gtt on 1/15.  TTE 1/14 with EF 25-30% with anteroapical and inferolateral hypokinesis (new from TTE 9/2015 in [facility]).  Cardiology signed off.  - continue aspirin, pravastatin, lisinopril, metoprolol, Imdur (dose split bid due to intermittent hypotension), lasix (dose reduced 1/18 due to bump in Cr)  - Cr stable 1/19  - no complaints of chest pressure 1/19, continue regimen as above    CAP, possible lung nodules: Admission CT chest with patchy nodular opacities, primarily of RLL, in addition to some mediastinal LAD.  No fever or leukocytosis.  Initiated on levofloxacin 1/13.  Flu swab negative 1/17.  Repeat CXR 1/18 consistent with RLL PNA.  Procalcitonin 1/18 low risk.  - follow up CT as outpatient in 3 months pending goals of care  - no significant improvement in resp status despite levo, but remains afebrile without leukocytosis; will switch back to levofloxacin to complete course as do not feel broadened coverage indicated given current evidence (curb-sided with ID)    Epigastric pain:  On 1/16 complaining of epigastric discomfort reproducible on palpation.  LFT's and lipase 1/16 wnl.    - discomfort persists and is reproducible on exam 1/19; will trial PPI    DM II:  Diet controlled.  A1C 6.9% this admission.    Chronic anemia:  Baseline hgb 10-11 g/dL, stable.    Hypothyroidism:  Continue PTA Synthroid.    Dementia:  Pleasantly confused and oriented to self.  Monitor for delirium.    Goals of care:  See discussion with daughter,  Linh, in progress note 1/18.  Per discussion 1/19, her sister is in agreement that we can continue antibiotics and other meds to optimize her status, but they would not be interested in more aggressive testing.    DVT Prophylaxis: Pneumatic Compression Devices  Code Status: DNR/DNI    Disposition: Expected discharge stable respiratory status and renal function, med optimization.  ?1-2 days    Updated daughter, Linh Welsh, at bedside 1/19.    Owen Melara    Interval History   Reports ongoing shortness of breath, also seems to endorse some pleuritic chest discomfort.  Denies chest pain or pressure but again endorses some epigastric discomfort (though reproducible).  Denies fever/chills.    -Data reviewed today: I reviewed all new labs and imaging results over the last 24 hours. I personally reviewed no images or EKG's today.    Physical Exam   Temp: 98.2  F (36.8  C) Temp src: Oral BP: 116/73   Heart Rate: 98 Resp: 20 SpO2: 94 % O2 Device: Nasal cannula Oxygen Delivery: 2 LPM  Vitals:    01/17/18 0300 01/18/18 0455 01/19/18 0300   Weight: 63.9 kg (140 lb 12.8 oz) 64 kg (141 lb 3.2 oz) 64 kg (141 lb 3.2 oz)     Vital Signs with Ranges  Temp:  [97.5  F (36.4  C)-98.2  F (36.8  C)] 98.2  F (36.8  C)  Heart Rate:  [] 98  Resp:  [20-22] 20  BP: ()/(50-73) 116/73  SpO2:  [90 %-97 %] 94 %  I/O last 3 completed shifts:  In: 490 [P.O.:490]  Out: 1050 [Urine:1050]    Constitutional: Well developed, well nourished elderly female, sitting up in chair in no acute distress  Respiratory: Coarse lung sounds with scattered crackles bilaterally, no increased work of breathing  Cardiovascular: regular rate and rhythm, normal S1/S2 without murmur, rubs or gallops  GI: abdomen soft, epigastric tenderness to palpation, non-distended, normal bowel sounds  Lymph:  trace peripheral edema  Other:  alert, confused, cranial nerves grossly intact    Medications     - MEDICATION INSTRUCTIONS -       - MEDICATION INSTRUCTIONS -        Reason ACE/ARB/ARNI order not selected         furosemide  10 mg Oral Daily     ertapenem (INVanz) IV  500 mg Intravenous Q24H     isosorbide mononitrate  15 mg Oral BID     metoprolol tartrate  6.25 mg Oral BID     lisinopril  2.5 mg Oral Daily     albuterol  2.5 mg Nebulization Q6H WA     acetaminophen (TYLENOL) tablet 1,000 mg  1,000 mg Oral TID     levothyroxine (SYNTHROID/LEVOTHROID) tablet 88 mcg  88 mcg Oral Daily     sodium chloride (PF)  3 mL Intracatheter Q8H     aspirin  324 mg Oral Once     pravastatin  40 mg Oral QPM     aspirin  81 mg Oral Daily       Data     Recent Labs  Lab 01/19/18  0515 01/18/18  0550 01/17/18  0539 01/16/18  0626 01/15/18  0550  01/13/18  2153 01/13/18  1745   WBC  --  4.1  --  5.3 3.8*  --   --   --    HGB  --  10.4*  --  10.3* 10.4*  --   --   --    MCV  --  94  --  95 96  --   --   --    PLT  --  198  --  206 232  --   --   --     134 132* 136 135  < >  --   --    POTASSIUM 4.3 4.4 4.2 4.1 4.0  < >  --   --    CHLORIDE 99 97 96 98 99  < >  --   --    CO2 30 31 32 33* 31  < >  --   --    BUN 32* 28 21 23 22  < >  --   --    CR 1.23* 1.26* 1.07* 1.13* 1.07*  < >  --   --    ANIONGAP 7 6 4 5 5  < >  --   --    RADHA 8.6 8.4* 8.8 8.5 8.5  < >  --   --    * 114* 130* 146* 114*  < >  --   --    ALBUMIN  --   --   --  3.0*  --   --   --   --    PROTTOTAL  --   --   --  6.5*  --   --   --   --    BILITOTAL  --   --   --  0.5  --   --   --   --    ALKPHOS  --   --   --  48  --   --   --   --    ALT  --   --   --  12  --   --   --   --    AST  --   --   --  15  --   --   --   --    LIPASE  --   --   --  98  --   --   --   --    TROPI  --  0.038  --   --   --   --  0.060* 0.059*   < > = values in this interval not displayed.    No results found for this or any previous visit (from the past 24 hour(s)).

## 2018-01-19 NOTE — PLAN OF CARE
Problem: Cardiac: ACS (Acute Coronary Syndrome) (Adult)  Goal: Signs and Symptoms of Listed Potential Problems Will be Absent, Minimized or Managed (Cardiac: ACS)  Signs and symptoms of listed potential problems will be absent, minimized or managed by discharge/transition of care (reference Cardiac: ACS (Acute Coronary Syndrome) (Adult) CPG).   Outcome: No Change  Alert to self, pleasant. [place]. Pocket talker in room. VSS soft bp's 2LPM. Tele Sinus tachy. Dyspnec on exertion. Voiding. No chest pain. Nausea noted zofran given x1. Minimal intake of food. Speech saw,continue regular diet, thin liquids and signed off. Frequent cough, congested. Crackles in lungs. D/c pending progress. May transition to comfort cares if no improvement

## 2018-01-19 NOTE — PLAN OF CARE
Problem: Patient Care Overview  Goal: Plan of Care/Patient Progress Review  Discharge Planner OT   Patient plan for discharge: none stated.   Current status: supine <> sit with CGA/min A, sit <> stand and ambulates with ww and CGA for safety. Pt ambulated approx 180 ft with CGA and ww and reports of fatigue and noted hypotensive response to exercise, BP at rest 135/69 and after walk /64, did not report dizziness. Pt coughing occasionally, hard of hearing and improved hearing with pocket talker. Pt oriented to self and  only, pt reoriented to place and date.   Barriers to return to prior living situation: decreased activity tolerance, overall strength, balance, safety/cognition.   Recommendations for discharge: TCU or return to memory care with A with ADL/IADL's and functional mobility as needed for safety.   Rationale for recommendations: daily therapy to increase ADL and functional mobility independence and strength and safety to PLOF or return to memory care with 24 hr A with ADL/IADL's and functional mobility.        Entered by: Lorene Mendez 2018 3:43 PM

## 2018-01-19 NOTE — PLAN OF CARE
Problem: Patient Care Overview  Goal: Plan of Care/Patient Progress Review  Outcome: No Change   [place], forgetful, reoriented to where she is, dyspnea with activity, eat poorly so diet encouraged frequently, has fine crackles and expiratory wheezes in lungs. Has an occasional harsh cough. Vding without difficulty. Oxygen on at 2 liters per NC.

## 2018-01-20 LAB
ANION GAP SERPL CALCULATED.3IONS-SCNC: 6 MMOL/L (ref 3–14)
BUN SERPL-MCNC: 25 MG/DL (ref 7–30)
CALCIUM SERPL-MCNC: 8.8 MG/DL (ref 8.5–10.1)
CHLORIDE SERPL-SCNC: 96 MMOL/L (ref 94–109)
CO2 SERPL-SCNC: 34 MMOL/L (ref 20–32)
CREAT SERPL-MCNC: 1.03 MG/DL (ref 0.52–1.04)
GFR SERPL CREATININE-BSD FRML MDRD: 50 ML/MIN/1.7M2
GLUCOSE SERPL-MCNC: 135 MG/DL (ref 70–99)
INTERPRETATION ECG - MUSE: NORMAL
POTASSIUM SERPL-SCNC: 4.6 MMOL/L (ref 3.4–5.3)
SODIUM SERPL-SCNC: 136 MMOL/L (ref 133–144)

## 2018-01-20 PROCEDURE — 25000125 ZZHC RX 250: Performed by: HOSPITALIST

## 2018-01-20 PROCEDURE — A9270 NON-COVERED ITEM OR SERVICE: HCPCS | Mod: GY | Performed by: HOSPITALIST

## 2018-01-20 PROCEDURE — 36415 COLL VENOUS BLD VENIPUNCTURE: CPT | Performed by: INTERNAL MEDICINE

## 2018-01-20 PROCEDURE — A9270 NON-COVERED ITEM OR SERVICE: HCPCS | Mod: GY | Performed by: INTERNAL MEDICINE

## 2018-01-20 PROCEDURE — 25000132 ZZH RX MED GY IP 250 OP 250 PS 637: Mod: GY | Performed by: INTERNAL MEDICINE

## 2018-01-20 PROCEDURE — 40000275 ZZH STATISTIC RCP TIME EA 10 MIN

## 2018-01-20 PROCEDURE — 12000000 ZZH R&B MED SURG/OB

## 2018-01-20 PROCEDURE — 99232 SBSQ HOSP IP/OBS MODERATE 35: CPT | Performed by: INTERNAL MEDICINE

## 2018-01-20 PROCEDURE — 94640 AIRWAY INHALATION TREATMENT: CPT | Mod: 76

## 2018-01-20 PROCEDURE — 25000132 ZZH RX MED GY IP 250 OP 250 PS 637: Mod: GY | Performed by: HOSPITALIST

## 2018-01-20 PROCEDURE — 94640 AIRWAY INHALATION TREATMENT: CPT

## 2018-01-20 PROCEDURE — 80048 BASIC METABOLIC PNL TOTAL CA: CPT | Performed by: INTERNAL MEDICINE

## 2018-01-20 RX ADMIN — ALBUTEROL SULFATE 2.5 MG: 2.5 SOLUTION RESPIRATORY (INHALATION) at 07:33

## 2018-01-20 RX ADMIN — PRAVASTATIN SODIUM 40 MG: 40 TABLET ORAL at 21:23

## 2018-01-20 RX ADMIN — OMEPRAZOLE 40 MG: 20 CAPSULE, DELAYED RELEASE ORAL at 10:45

## 2018-01-20 RX ADMIN — Medication 10 MG: at 10:44

## 2018-01-20 RX ADMIN — ACETAMINOPHEN 1000 MG: 500 TABLET, FILM COATED ORAL at 21:22

## 2018-01-20 RX ADMIN — Medication 15 MG: at 21:22

## 2018-01-20 RX ADMIN — LEVOTHYROXINE SODIUM 88 MCG: 88 TABLET ORAL at 10:44

## 2018-01-20 RX ADMIN — Medication 6.25 MG: at 10:45

## 2018-01-20 RX ADMIN — ALBUTEROL SULFATE 2.5 MG: 2.5 SOLUTION RESPIRATORY (INHALATION) at 12:47

## 2018-01-20 RX ADMIN — ASPIRIN 81 MG 81 MG: 81 TABLET ORAL at 10:45

## 2018-01-20 RX ADMIN — LISINOPRIL 2.5 MG: 2.5 TABLET ORAL at 10:44

## 2018-01-20 RX ADMIN — ACETAMINOPHEN 1000 MG: 500 TABLET, FILM COATED ORAL at 10:44

## 2018-01-20 RX ADMIN — Medication 6.25 MG: at 21:23

## 2018-01-20 RX ADMIN — Medication 15 MG: at 10:45

## 2018-01-20 RX ADMIN — LEVOFLOXACIN 250 MG: 250 TABLET, FILM COATED ORAL at 10:45

## 2018-01-20 RX ADMIN — ALBUTEROL SULFATE 2.5 MG: 2.5 SOLUTION RESPIRATORY (INHALATION) at 19:15

## 2018-01-20 NOTE — PLAN OF CARE
Problem: Patient Care Overview  Goal: Plan of Care/Patient Progress Review  Outcome: No Change  Pt alert to self. Very [place]. VSS, 2L NC, wheezes/crackles congested cough. ANGELO. Cardiac diet-fair appetite. Up A1 GB. PIV patent SL. Had bed bath today- slept most of shift. Plan: monitor renal function-possible DC TCU or back to memory care

## 2018-01-20 NOTE — PROGRESS NOTES
[facility]    Hospitalist Progress Note    Date of Service (when I saw the patient): 01/20/2018    Assessment & Plan   Olga Zelaya is a 89 year old female who was admitted on 1/13/2018.  Past history of dementia, DM II, HTN, hyperlipidemia, hypothyroidism who presents with chest pain and dyspnea, found to have NSTEMI and possible PNA.     NSTEMI:    Admission EKG without ischemic changes, serial troponin mildly elevated but flat.  HDL 61, LDL 95.  Managed medically per family wishes, completed 48-hours heparin gtt on 1/15.  TTE 1/14 with EF 25-30% with anteroapical and inferolateral hypokinesis (new from TTE 9/2015 in [facility]).  Cardiology signed off.  - continue aspirin, pravastatin, lisinopril, metoprolol, Imdur (dose split bid due to intermittent hypotension), lasix (dose reduced 1/18 due to bump in Cr)  - Cr stable 1/19. BMP pending today.   - No complaints of chest pressure since 1/19, continue regimen as above.      CAP, possible lung nodules:   Admission CT chest with patchy nodular opacities, primarily of RLL, in addition to some mediastinal LAD.  No fever or leukocytosis.  Initiated on levofloxacin 1/13.  Flu swab negative 1/17.  Repeat CXR 1/18 consistent with RLL PNA.  Procalcitonin 1/18 low risk.  - follow up CT as outpatient in 3 months pending goals of care  - No significant improvement in resp status despite levo, but remains afebrile without leukocytosis; will switch back to levofloxacin to complete course as do not feel broadened coverage indicated given current evidence (curb-sided with ID).      Epigastric pain:    On 1/16 complaining of epigastric discomfort reproducible on palpation.  LFT's and lipase 1/16 wnl.    - Discomfort persists and is reproducible on exam 1/19; will trial PPI.  - No Pain on exam 1/20.      DM II:    Diet controlled.  A1C 6.9% this admission.     Chronic anemia:    Baseline hgb 10-11 g/dL, stable.     Hypothyroidism:    Continue PTA  Synthroid.     Dementia:    Pleasantly confused and oriented to self.  Monitor for delirium.     Goals of care:    See discussion with daughter, Linh, in progress note 1/18.  Per discussion 1/19, her sister is in agreement that we can continue antibiotics and other meds to optimize her status, but they would not be interested in more aggressive testing.     DVT Prophylaxis: Pneumatic Compression Devices  Code Status: DNR/DNI     Disposition: Stable resp status on 2 L at present.  If renal fxn is stable in am then pt can possibly discharge 1/21 back to her memory care unit vs TCU.        Medhat Miller       Interval History   No acute overnight events reported. Vitals stable. Saturating well on 2 L NC.     -Data reviewed today: I reviewed all new labs and imaging results over the last 24 hours. I personally reviewed no images or EKG's today.    Physical Exam   Temp: 98.4  F (36.9  C) Temp src: Axillary BP: 124/61 Pulse: 98 Heart Rate: 94 Resp: 16 SpO2: 94 % O2 Device: Nasal cannula Oxygen Delivery: 2 LPM  Vitals:    01/18/18 0455 01/19/18 0300 01/20/18 0451   Weight: 64 kg (141 lb 3.2 oz) 64 kg (141 lb 3.2 oz) 64.4 kg (142 lb)     Vital Signs with Ranges  Temp:  [96.9  F (36.1  C)-98.8  F (37.1  C)] 98.4  F (36.9  C)  Pulse:  [98] 98  Heart Rate:  [85-96] 94  Resp:  [16-22] 16  BP: (113-135)/(61-77) 124/61  SpO2:  [92 %-95 %] 94 %  I/O last 3 completed shifts:  In: 340 [P.O.:340]  Out: 1100 [Urine:1100]    Gen: Patient in no acute distress.  Appears comfortable sleeping. Arouses to voice and pleasantly confused.   Heart:  S1S2+, regular rate and rhythm, No murmurs.  Lungs:  Coarse BS b/l anteriorly, faint exp wheeze. Decreased posteriorly with no wheezing or rales.    Abdomen:  Soft, non tender, non distended, bowel sounds positive.  Extremities:  Trace pedal edema.    Medications     - MEDICATION INSTRUCTIONS -       - MEDICATION INSTRUCTIONS -       Reason ACE/ARB/ARNI order not selected          omeprazole  40 mg Oral QAM AC     levofloxacin  250 mg Oral Daily     furosemide  10 mg Oral Daily     isosorbide mononitrate  15 mg Oral BID     metoprolol tartrate  6.25 mg Oral BID     lisinopril  2.5 mg Oral Daily     albuterol  2.5 mg Nebulization Q6H WA     acetaminophen (TYLENOL) tablet 1,000 mg  1,000 mg Oral TID     levothyroxine (SYNTHROID/LEVOTHROID) tablet 88 mcg  88 mcg Oral Daily     sodium chloride (PF)  3 mL Intracatheter Q8H     aspirin  324 mg Oral Once     pravastatin  40 mg Oral QPM     aspirin  81 mg Oral Daily       Data     Recent Labs  Lab 01/19/18  0515 01/18/18  0550 01/17/18  0539 01/16/18  0626 01/15/18  0550  01/13/18  2153 01/13/18  1745   WBC  --  4.1  --  5.3 3.8*  --   --   --    HGB  --  10.4*  --  10.3* 10.4*  --   --   --    MCV  --  94  --  95 96  --   --   --    PLT  --  198  --  206 232  --   --   --     134 132* 136 135  < >  --   --    POTASSIUM 4.3 4.4 4.2 4.1 4.0  < >  --   --    CHLORIDE 99 97 96 98 99  < >  --   --    CO2 30 31 32 33* 31  < >  --   --    BUN 32* 28 21 23 22  < >  --   --    CR 1.23* 1.26* 1.07* 1.13* 1.07*  < >  --   --    ANIONGAP 7 6 4 5 5  < >  --   --    RADHA 8.6 8.4* 8.8 8.5 8.5  < >  --   --    * 114* 130* 146* 114*  < >  --   --    ALBUMIN  --   --   --  3.0*  --   --   --   --    PROTTOTAL  --   --   --  6.5*  --   --   --   --    BILITOTAL  --   --   --  0.5  --   --   --   --    ALKPHOS  --   --   --  48  --   --   --   --    ALT  --   --   --  12  --   --   --   --    AST  --   --   --  15  --   --   --   --    LIPASE  --   --   --  98  --   --   --   --    TROPI  --  0.038  --   --   --   --  0.060* 0.059*   < > = values in this interval not displayed.    No results found for this or any previous visit (from the past 24 hour(s)).

## 2018-01-20 NOTE — PLAN OF CARE
Problem: Cardiac: ACS (Acute Coronary Syndrome) (Adult)  Goal: Signs and Symptoms of Listed Potential Problems Will be Absent, Minimized or Managed (Cardiac: ACS)  Signs and symptoms of listed potential problems will be absent, minimized or managed by discharge/transition of care (reference Cardiac: ACS (Acute Coronary Syndrome) (Adult) CPG).   Outcome: No Change  [id number]: Alert to self only. Pleasant with cares. Very [place]. Tele SR. VSS on 2LNC.  ANGELO and expiratory bilateral wheezes noted. Up with A1 and walker to bathroom. Patient sleeping most of evening.

## 2018-01-20 NOTE — PLAN OF CARE
Problem: Patient Care Overview  Goal: Plan of Care/Patient Progress Review  Outcome: No Change  Patient is alert to self only. Very hard of hearing. Denies pain/chest pain. Vital signs are stable. Telemetry is normal sinus rhythm. Oxygen stable on 2LPM via nasal cannula. Crackles/expiratory wheezes noted; dyspneic on exertion. Faizan bilateral lower extremities with trace edema noted; bruises throughout. Cardiac diet ordered. Frequency/urgency voiding. No BM this shift. PIV SL. Up with assist x1 + walker to bedside commode. Plan pending clinical course. Will continue to monitor.

## 2018-01-21 VITALS
SYSTOLIC BLOOD PRESSURE: 93 MMHG | DIASTOLIC BLOOD PRESSURE: 64 MMHG | WEIGHT: 142 LBS | RESPIRATION RATE: 18 BRPM | BODY MASS INDEX: 22.92 KG/M2 | HEART RATE: 98 BPM | TEMPERATURE: 96.6 F | OXYGEN SATURATION: 97 %

## 2018-01-21 PROCEDURE — 25000132 ZZH RX MED GY IP 250 OP 250 PS 637: Mod: GY | Performed by: INTERNAL MEDICINE

## 2018-01-21 PROCEDURE — A9270 NON-COVERED ITEM OR SERVICE: HCPCS | Mod: GY | Performed by: INTERNAL MEDICINE

## 2018-01-21 PROCEDURE — A9270 NON-COVERED ITEM OR SERVICE: HCPCS | Mod: GY | Performed by: HOSPITALIST

## 2018-01-21 PROCEDURE — 25000132 ZZH RX MED GY IP 250 OP 250 PS 637: Mod: GY | Performed by: HOSPITALIST

## 2018-01-21 PROCEDURE — 40000275 ZZH STATISTIC RCP TIME EA 10 MIN

## 2018-01-21 PROCEDURE — 25000125 ZZHC RX 250: Performed by: HOSPITALIST

## 2018-01-21 PROCEDURE — 94640 AIRWAY INHALATION TREATMENT: CPT | Mod: 76

## 2018-01-21 PROCEDURE — 99239 HOSP IP/OBS DSCHRG MGMT >30: CPT | Performed by: INTERNAL MEDICINE

## 2018-01-21 RX ORDER — ISOSORBIDE MONONITRATE 30 MG/1
15 TABLET, EXTENDED RELEASE ORAL 2 TIMES DAILY
Qty: 30 TABLET | Refills: 3 | Status: SHIPPED | OUTPATIENT
Start: 2018-01-21 | End: 2018-02-20

## 2018-01-21 RX ORDER — PRAVASTATIN SODIUM 40 MG
40 TABLET ORAL EVERY EVENING
Qty: 30 TABLET | Refills: 3 | Status: SHIPPED | OUTPATIENT
Start: 2018-01-21 | End: 2018-02-20

## 2018-01-21 RX ORDER — LISINOPRIL 2.5 MG/1
2.5 TABLET ORAL DAILY
Qty: 30 TABLET | Refills: 3 | Status: SHIPPED | OUTPATIENT
Start: 2018-01-22 | End: 2018-02-21

## 2018-01-21 RX ORDER — METOPROLOL TARTRATE 25 MG/1
6.25 TABLET, FILM COATED ORAL 2 TIMES DAILY
Qty: 15 TABLET | Refills: 3 | Status: SHIPPED | OUTPATIENT
Start: 2018-01-21 | End: 2018-02-20

## 2018-01-21 RX ORDER — OMEPRAZOLE 40 MG/1
40 CAPSULE, DELAYED RELEASE ORAL
Qty: 30 CAPSULE | Refills: 3 | Status: SHIPPED | OUTPATIENT
Start: 2018-01-22 | End: 2018-02-21

## 2018-01-21 RX ORDER — GUAIFENESIN/DEXTROMETHORPHAN 100-10MG/5
5 SYRUP ORAL EVERY 4 HOURS PRN
Qty: 560 ML | Refills: 0 | Status: SHIPPED | OUTPATIENT
Start: 2018-01-21 | End: 2018-01-28

## 2018-01-21 RX ORDER — FUROSEMIDE 20 MG
10 TABLET ORAL DAILY
Qty: 15 TABLET | Refills: 3 | Status: SHIPPED | OUTPATIENT
Start: 2018-01-22 | End: 2018-02-21

## 2018-01-21 RX ADMIN — ALBUTEROL SULFATE 2.5 MG: 2.5 SOLUTION RESPIRATORY (INHALATION) at 12:59

## 2018-01-21 RX ADMIN — ACETAMINOPHEN 1000 MG: 500 TABLET, FILM COATED ORAL at 08:45

## 2018-01-21 RX ADMIN — OMEPRAZOLE 40 MG: 20 CAPSULE, DELAYED RELEASE ORAL at 08:31

## 2018-01-21 RX ADMIN — LEVOTHYROXINE SODIUM 88 MCG: 88 TABLET ORAL at 08:44

## 2018-01-21 RX ADMIN — Medication 10 MG: at 08:45

## 2018-01-21 RX ADMIN — Medication 6.25 MG: at 08:45

## 2018-01-21 RX ADMIN — LISINOPRIL 2.5 MG: 2.5 TABLET ORAL at 08:44

## 2018-01-21 RX ADMIN — LEVOFLOXACIN 250 MG: 250 TABLET, FILM COATED ORAL at 08:44

## 2018-01-21 RX ADMIN — ASPIRIN 81 MG 81 MG: 81 TABLET ORAL at 08:45

## 2018-01-21 RX ADMIN — Medication 15 MG: at 08:45

## 2018-01-21 NOTE — PLAN OF CARE
Problem: Patient Care Overview  Goal: Plan of Care/Patient Progress Review  Pt alert to self. Extremely [place], voicebox w/ headphones given. VSS on RA. Lethargic. Ambulated to  to void, continent. Poor appetite. PIV CHELSEAIliana Melton, daughter called, writer updated. Linh plans to call tomorrow with update on d/c plan, if pt d/c [facility]. Linh is able to be here ~1600 to transport pt back to [facility] facility. Tele: RIGO.

## 2018-01-21 NOTE — PLAN OF CARE
Problem: Patient Care Overview  Goal: Plan of Care/Patient Progress Review  Pt alert to self. Extremely [place], pocket talker w/ headphones at bedside. VSS on RA, ex. gave 1L O2 overnight for pt request/comfort, can be tachy at times HR 99. Tele SR with First degree AV block and PAC. Lethargic. Ambulated to BR, AUP. Tolerating cardiac diet, lite snack x2. PIV SL. Linh, daughter called, writer updated. Linh plans to call today with update on d/c plan, if pt d/c today. Linh is able to be here ~1600 to transport pt back to [facility] facility.

## 2018-01-21 NOTE — PLAN OF CARE
Problem: Patient Care Overview  Goal: Plan of Care/Patient Progress Review  OT/CR: Pt visiting with family upon OT attempt for session. Per chart, pt discharging today at 1630.     Occupational Therapy and Cardiac Rehab Discharge Summary    Reason for therapy discharge:    Discharged to home to Memory care    Progress towards therapy goal(s). See goals on Care Plan in [facility] electronic health record for goal details.  Goals not met.  Barriers to achieving goals:   discharge from facility.    Therapy recommendation(s):    Recommend assist with ADLS/IADLs and mobility for safety at care facility.

## 2018-01-21 NOTE — PLAN OF CARE
Problem: Patient Care Overview  Goal: Plan of Care/Patient Progress Review  Outcome: Improving  Pt alert to self. Extremely [place], pocket talker available, but prefers not to use. VSS on RA, c/o SOB sats 95%RA- LS remain fine crackles to RLL. Tele: Sinus bradycardia. Ambulated to BR, up in chair for meals. Tolerating cardiac diet, lite snack x2. PIV SL. Plan: DC back to [facility] today Dtr Linh to  at 1630.

## 2018-01-21 NOTE — DISCHARGE SUMMARY
[facility]    Discharge Summary  Hospitalist    Date of Admission:  1/13/2018  Date of Discharge:  1/21/2018  Discharging Provider: Medhat Miller  Date of Service (when I saw the patient): 01/21/18    Discharge Diagnoses     NSTEMI  CAP  Possible Lung Nodules  Epigastric Pain  Dementia      History of Present Illness   Olga Zelaya is a 89 year old female who was admitted on 1/13/2018.  Past history of dementia, DM II, HTN, hyperlipidemia, hypothyroidism who presents with chest pain and dyspnea, found to have NSTEMI and possible PNA.    Hospital Course   Olga Zelaya was admitted on 1/13/2018.  The following problems were addressed during her hospitalization:        NSTEMI:    Admission EKG without ischemic changes, serial troponin mildly elevated but flat.  HDL 61, LDL 95.  Managed medically per family wishes, completed 48-hours heparin gtt on 1/15.  TTE 1/14 with EF 25-30% with anteroapical and inferolateral hypokinesis (new from TTE 9/2015 in [facility]).  Cardiology signed off.  Continue aspirin, pravastatin, lisinopril, metoprolol, Imdur (dose split bid due to intermittent hypotension), lasix (dose reduced 1/18 due to bump in Cr). Close pcp follow up.        CAP, possible lung nodules:   Admission CT chest with patchy nodular opacities, primarily of RLL, in addition to some mediastinal LAD.  No fever or leukocytosis.  Initiated on levofloxacin 1/13.  Flu swab negative 1/17.  Repeat CXR 1/18 consistent with RLL PNA.  Treated with a coarse of Levaquin.  Follow up CT as outpatient in 3 months pending goals of care.      Epigastric pain:    On 1/16 complaining of epigastric discomfort reproducible on palpation.  LFT's and lipase 1/16 wnl.  Discomfort persists and is reproducible on exam 1/19; trial PPI initiated. No Pain on exam 1/20. PCP follow up for ongoing sx's.        Chronic anemia:    Baseline hgb 10-11 g/dL, stable.      Dementia:    Pleasantly confused and oriented  to self.  Monitor for delirium.      Goals of care:    See discussion with daughter, Linh, in progress note 1/18.  Per discussion 1/19, her sister is in agreement that we can continue antibiotics and other meds to optimize her status, but they would not be interested in more aggressive testing.         Medhat Miller      Significant Results and Procedures   See below    Pending Results   These results will be followed up by PCP, Cardiology  Unresulted Labs Ordered in the Past 30 Days of this Admission     No orders found from 11/14/2017 to 1/14/2018.          Code Status   DNR / DNI       Primary Care Physician   ANTIONE LIMON    Physical Exam   Temp: 96.3  F (35.7  C) Temp src: Oral BP: 122/72   Heart Rate: 84 Resp: 16 SpO2: 95 % O2 Device: None (Room air) Oxygen Delivery: 1 LPM  Vitals:    01/18/18 0455 01/19/18 0300 01/20/18 0451   Weight: 64 kg (141 lb 3.2 oz) 64 kg (141 lb 3.2 oz) 64.4 kg (142 lb)     Vital Signs with Ranges  Temp:  [95.8  F (35.4  C)-97.6  F (36.4  C)] 96.3  F (35.7  C)  Heart Rate:  [] 84  Resp:  [16-18] 16  BP: (111-135)/(54-73) 122/72  SpO2:  [91 %-96 %] 95 %  I/O last 3 completed shifts:  In: 485 [P.O.:485]  Out: 600 [Urine:600]    Gen: Patient in no acute distress.  Appears comfortable.  Heart:  S1S2+, regular rate and rhythm, No murmurs.  Lungs:  Clear to auscultation, no wheezing, no rales.   Abdomen:  Soft, non tender, non distended, bowel sounds positive.  Extremities:  No edema.    Discharge Disposition   Discharged to [facility] Care  Condition at discharge: Stable    Consultations This Hospital Stay   PHARMACY TO DOSE HEPARIN  PHARMACY TO DOSE HEPARIN  CARDIAC REHAB IP CONSULT  CARDIOLOGY IP CONSULT  PHARMACY TO DOSE HEPARIN  SPEECH LANGUAGE PATH ADULT IP CONSULT  SPEECH LANGUAGE PATH ADULT IP CONSULT  SOCIAL WORK IP CONSULT  SMOKING CESSATION PROGRAM IP CONSULT    Time Spent on this Encounter   I, Medhat Miller, personally saw the patient today and spent  greater than 30 minutes discharging this patient.    Discharge Orders     Follow Up and recommended labs and tests   Follow up with primary care provider, ANTIONE LIMON, within 7 days to evaluate medication change and for hospital follow- up.  No follow up labs or test are needed. Will need to follow her BP on her new regimen. Monitor for resolution of her suspected PNA.   - Cardiology follow up in one month.     Activity   Your activity upon discharge: activity as tolerated     DNR/DNI     Diet   Follow this diet upon discharge:      Snacks/Supplements Adult: Ensure Plus (Adult); Between Meals     Combination Diet Low Saturated Fat Na <2400mg Diet       Discharge Medications   Current Discharge Medication List      START taking these medications    Details   pravastatin (PRAVACHOL) 40 MG tablet Take 1 tablet (40 mg) by mouth every evening  Qty: 30 tablet, Refills: 3    Associated Diagnoses: ACS (acute coronary syndrome) (H)      lisinopril (PRINIVIL/ZESTRIL) 2.5 MG tablet Take 1 tablet (2.5 mg) by mouth daily  Qty: 30 tablet, Refills: 3    Comments: Hold for sbp < 100 or Hr < 60  Associated Diagnoses: ACS (acute coronary syndrome) (H)      metoprolol tartrate (LOPRESSOR) 25 MG tablet Take 0.25 tablets (6.25 mg) by mouth 2 times daily  Qty: 15 tablet, Refills: 3    Comments: Hold for sbp , 100 or HR < 60  Associated Diagnoses: ACS (acute coronary syndrome) (H)      guaiFENesin-dextromethorphan (ROBITUSSIN DM) 100-10 MG/5ML syrup Take 5 mLs by mouth every 4 hours as needed for cough  Qty: 560 mL, Refills: 0    Associated Diagnoses: Cough      furosemide (LASIX) 20 MG tablet Take 0.5 tablets (10 mg) by mouth daily  Qty: 15 tablet, Refills: 3    Comments: Hold for sbp < 100  Associated Diagnoses: Essential hypertension      omeprazole (PRILOSEC) 40 MG capsule Take 1 capsule (40 mg) by mouth every morning (before breakfast)  Qty: 30 capsule, Refills: 3    Associated Diagnoses: Cough      isosorbide mononitrate (IMDUR)  30 MG 24 hr tablet Take 0.5 tablets (15 mg) by mouth 2 times daily  Qty: 30 tablet, Refills: 3    Comments: Hold for sbp < 100  Associated Diagnoses: Essential hypertension; ACS (acute coronary syndrome) (H)         CONTINUE these medications which have NOT CHANGED    Details   mineral oil enema Place 1 enema rectally once as needed for constipation      calcium carbonate (TUMS) 500 MG chewable tablet Take 1 chew tab by mouth 4 times daily as needed for heartburn      ASPIRIN PO Take 81 mg by mouth daily      NITROGLYCERIN SL Place 0.4 mg under the tongue every 5 minutes as needed for chest pain      LEVOTHYROXINE SODIUM PO Take 88 mcg by mouth daily      ASCORBIC ACID PO Take 1,000 mg by mouth daily      ACETAMINOPHEN PO Take 1,000 mg by mouth 3 times daily      magnesium hydroxide (MILK OF MAGNESIA) 400 MG/5ML suspension Take 30 mLs by mouth daily as needed for constipation or heartburn      sennosides (SENOKOT) 8.6 MG tablet Take 1 tablet by mouth daily as needed for constipation      cholecalciferol (VITAMIN D) 1000 UNIT tablet Take 1 tablet by mouth daily.           Allergies   Allergies   Allergen Reactions     Biaxin [Clarithromycin]      Codeine      Nabumetone      Penicillins      Seafood      Sulfa Drugs      Data   Most Recent 3 CBC's:  Recent Labs   Lab Test  01/18/18   0550  01/16/18   0626  01/15/18   0550   WBC  4.1  5.3  3.8*   HGB  10.4*  10.3*  10.4*   MCV  94  95  96   PLT  198  206  232      Most Recent 3 BMP's:  Recent Labs   Lab Test  01/20/18   1320  01/19/18   0515  01/18/18   0550   NA  136  136  134   POTASSIUM  4.6  4.3  4.4   CHLORIDE  96  99  97   CO2  34*  30  31   BUN  25  32*  28   CR  1.03  1.23*  1.26*   ANIONGAP  6  7  6   RADHA  8.8  8.6  8.4*   GLC  135*  104*  114*     Most Recent 2 LFT's:  Recent Labs   Lab Test  01/16/18   0626  11/30/17   1234   AST  15  14   ALT  12  9   ALKPHOS  48  55   BILITOTAL  0.5  0.6     Most Recent INR's and Anticoagulation Dosing  History:  Anticoagulation Dose History     Recent Dosing and Labs Latest Ref Rng & Units 2/19/2012 8/9/2013 11/29/2017    INR 0.86 - 1.14 1.05 1.00 1.04        Most Recent 3 Troponin's:  Recent Labs   Lab Test  01/18/18   0550  01/13/18   2153  01/13/18   1745   TROPI  0.038  0.060*  0.059*     Most Recent Cholesterol Panel:  Recent Labs   Lab Test  01/14/18   0543   CHOL  176   LDL  95   HDL  61   TRIG  99     Most Recent 6 Bacteria Isolates From Any Culture (See EPIC Reports for Culture Details):No lab results found.  Most Recent TSH, T4 and A1c Labs:  Recent Labs   Lab Test  01/15/18   0550  04/26/17   1725   TSH   --   3.75   A1C  6.9*  7.3*       Results for orders placed or performed during the hospital encounter of 01/13/18   Chest CT, IV contrast only - PE protocol    Narrative    CT CHEST PULMONARY EMBOLISM WITH CONTRAST January 13, 2018 8:25 AM    HISTORY: Pulmonary embolus, chest pain, dyspnea, elevated D-dimer.    TECHNIQUE: Scans obtained from the apices through the diaphragm with  IV contrast. 59 mL Isovue-370 injected. Radiation dose for this scan  was reduced using automated exposure control, adjustment of the mA  and/or kV according to patient size, or iterative reconstruction  technique.    COMPARISON: CT chest, abdomen and pelvis 11/29/2017.    FINDINGS: No acute thoracic aortic abnormality. Thoracic aortic and  coronary artery calcifications again identified. This appears stable.  No evidence for acute pulmonary embolism. Some tiny linear  hypodensities within the posteroinferior right lower lobe small  pulmonary artery branch on series 4 image 96 appears similar to the  prior exam and may just represent some volume averaging versus tiny  scarring.    There are small bilateral pleural effusions newly identified. Patchy  scarring or consolidation is slightly more prominent at the anterior  right mid chest in the region of the minor fissure series 5 image 65.  There are multifocal areas of nodular  opacity within the bilateral  lungs. Several of these regions can be seen at the right upper lobe,  for example series 5 image 52-56. A new prominent nodule at the  lingula is 1.1 cm image 86. Stable elongated nodular opacity at the  anteromedial left upper lobe measuring 1.2 cm image 46. There are  other nodular examples identified. Subpleural prominent area of  nodular consolidation is 3.9 cm anterolateral right lung base image  107 and is newly seen.    Multichamber cardiomegaly. Several enlarged lymph nodes identified.  Right paratracheal lymph node is now 1.1 cm, previously 0.9 cm image  51. Other scattered lymph nodes also appear mildly more prominent.  Small hilar lymph nodes are present as well.    Upper abdomen images shows no new significant abnormality. Biliary  ductal dilatation again noted. Compression deformity of T7 appears  stable.      Impression    IMPRESSION:  1. No acute thoracic aortic abnormality. No evidence for acute  pulmonary embolism.  2. New small bibasilar pleural effusions and bibasilar atelectasis.  3. Patchy nodular airspace opacities noted, dominantly seen at the  right lower lobe base, with a few other areas also identified.  Recommend follow-up CT chest in three months for this indeterminate  finding. Cannot exclude pneumonia, particularly at the right lung  base.  4. Several lymph nodes are mildly larger in the mediastinum. Recommend  further attention to these at follow-up imaging.  5. Cardiomegaly.    ANTIONE PAYNE MD   XR Chest 2 Views    Narrative    CHEST TWO VIEWS  1/18/2018 8:02 AM     HISTORY:  Dyspnea.     COMPARISON: Chest CT dated 1/13/2018, chest x-rays dated 4/28/2017.    FINDINGS:  Small left and small to moderate-sized right pleural fluid  collections are noted. Hyperaeration is again seen. Increased  interstitial markings in the right lung as compared to the left are  concerning for pulmonary infiltrate. Superimposed asymmetric pulmonary  edema is also possible.  Chronic silhouette is enlarged. No  pneumothorax is identified.      Impression    IMPRESSION:  1. Findings concerning for right mid and lower lung pulmonary  infiltrates versus asymmetric pulmonary edema.  2. Cardiomegaly and bilateral pleural fluid collections could  represent underlying congestive heart failure.  3. Hyperaeration is again noted.     MINERVA MOORE MD

## 2018-01-21 NOTE — PROGRESS NOTES
[facility] PROGRESS NOTE:     D/I: KAI notified by Dr. Miller that pt will DC today and return to her [facility] [address]. KAI called pt's dtr, Linh, and left a message asking to discuss DC plans. KAI also called [facility] [address] (P: [phone]) and left a message for ALEXEY Love as directed by the  at the facility. SW will need to fax DC orders to [facility] prior to pt's DC since she is cared for by the staff in the memory care unit.   P: Pt will return to her [facility] later today.     OUSMANE Martinez, LICSW [phone]      UPDATE 1600:   KAI has called [facility] [address] 3x's with no response from staff. Pt's dtr Linh returned SWS call and plans to pick pt up at 1630 and transport her back to [facility] [address]. KAI is not able to fax DC orders/paperwork without a confirmed fax number. Pt's dtr was made aware of the issue and plans to call the facility to alert them of pt's pending return this evening. KAI printed a packet with 's hospital DC information and MAR for pt's dtr to take with her to the [facility], in case there is no contact with [facility] staff prior to pt's DC. Bed side nurse and charge nurse made aware of the above concerns.

## 2018-01-22 LAB — INTERPRETATION ECG - MUSE: NORMAL

## 2018-01-22 NOTE — PLAN OF CARE
Problem: Patient Care Overview  Goal: Plan of Care/Patient Progress Review  Outcome: No Change    Patient discharged at 5:45 PM to home. IV was discontinued. Pain at time of discharge was 0/10. Belongings returned to patient.  Discharge instructions and medications reviewed with patient and daughter. Instructions given to daughter to give discharge instructions to memory care facility.  Patient and daughter verbalized understanding and all questions were answered.   At time of discharge, patient condition was stable and left the unit 88 escorted by daughter and nursing assistant.

## 2018-08-29 ENCOUNTER — RECORDS - HEALTHEAST (OUTPATIENT)
Dept: LAB | Facility: CLINIC | Age: 83
End: 2018-08-29

## 2018-08-29 LAB
ANION GAP SERPL CALCULATED.3IONS-SCNC: 11 MMOL/L (ref 5–18)
BUN SERPL-MCNC: 41 MG/DL (ref 8–28)
CALCIUM SERPL-MCNC: 9.2 MG/DL (ref 8.5–10.5)
CHLORIDE BLD-SCNC: 93 MMOL/L (ref 98–107)
CO2 SERPL-SCNC: 36 MMOL/L (ref 22–31)
CREAT SERPL-MCNC: 1.62 MG/DL (ref 0.6–1.1)
GFR SERPL CREATININE-BSD FRML MDRD: 30 ML/MIN/1.73M2
GLUCOSE BLD-MCNC: 119 MG/DL (ref 70–125)
POTASSIUM BLD-SCNC: 3.5 MMOL/L (ref 3.5–5)
SODIUM SERPL-SCNC: 140 MMOL/L (ref 136–145)

## 2019-07-01 NOTE — ED NOTES
Bed: ED28  Expected date: 4/24/17  Expected time: 10:11 AM  Means of arrival: Ambulance  Comments:  BALDOMERO 89 yo F   Digestive

## 2021-06-10 NOTE — PROGRESS NOTES
[facility]    Facility:   [facility] [[id number]]   Code Status: POLST AVAILABLE    Admission evaluation to TCU. All history is taken from accompanying hospital chart. Patient with advanced dementia is unable to provide any meaningful history. Patient with Alzheimer's disease without behavioral disturbance, type II diabetes mellitus, coronary artery disease on isosorbide, hypothyroidism on Levothroid 88 MCG, 89 years of age, residing in [facility], suffered a mechanical fall 4/28, brought to [facility] with CT scan of head hip and pelvis x-ray negative for acute pathology, contusion noted of the right buttock and left chest wall,pain right hip, right hip pain persistent, was placed in physical therapy in her [facility], due to severity of pain and with new complaints of left visual blurring was brought to emergency department. CT scan head without contrast, CTA of head and back and CT head with contrast all negative for acute pathology. Patient's left eye symptoms persisted, negative workup for stroke. Stabilized in hospital and transferred to TCU for rehabilitation, observation of clinical status and management of medical problems which otherwise include hypertension.Followed by neurology, TA biopsy results pending.    Allergies Biaxin, codeine, penicillin, seafood, sulfa,    Surgery appendectomy cholecystectomy,    Social non-smoker no alcohol,    Family history patient cannot recall.    Review of systems: patient complains of fatigue, 12 point ROS otherwise entirely negative, questionably reliable in view of dementia.    Exam: patient lying supine in bed in no apparent distress, highly drowsy, awakens, oriented to person only, not to place or time. Blood pressure 164/86, pulse 92, temperature 99.2, respiratory 22, 02 saturation 98%. No facial asymmetry. Pharynx without erythema. No carotid bruits or HJR. S1 and S2 regular with systolic murmur. Pulmonary exam without rales or wheezes.  Abdomen without hepatosplenomegaly masses tenderness or bruits. Modest DJD changes of digital joints. Knees without acute inflammatory change. No calf tenderness or swelling. Pedal pulses symmetrical and full. No calf tenderness or swelling. Strength and muscle tone diminished, symmetry cannot be assured due to lack of patient's ability to participate in examination.    Impression and plan: recent multiple falls, no evidence of acute cardiac or CNS process, no evidence of acute bony injury.Recent hip pain adequately controlled. History of hypertension, intermittent elevation of blood pressure since transfer to TCU, continue to monitor off medications. Coronary artery disease on isosorbide, nitroglycerin is available on a PRN basis if necessary. History of diabetes mellitus two, there is no medication ordered for diabetes which by report is diet controlled, sliding-scale insulin used in hospital, hemoglobin A1c of 7.3, follow Accu checks while in TCU. Multiple medical issues are evaluated, monitor both blood pressure and glucose with noncoverage at present, monitor neurologic status.. Hypothyroidism on replacement continuing with Levothroid, . Advanced severe deconditioning, need for rehabilitation. Vision loss of unclear etiology, CRP elevated on admission to hospital, temporal artery biopsy performed, final results of temporal artery biopsy to be obtained. Hyperlipidemia not on lipid-lowering medications. Renal insufficiency on admission to hospital resolved. History of ortho static hypotension, continue to monitor for symptoms.Extensive hospital notes reviewed. Discussion with nursing staff.      Electronically signed by: Ivis Milner MD

## 2021-06-10 NOTE — PROGRESS NOTES
[facility]    Facility:   [facility] [[id number]]   Code Status: POLST AVAILABLE      Patient is seen for follow up evaluation and discharge planning. This note will additionally serve as face-to-face contact regarding patient's need for a wheelchair. Patient has a mobility limitation that significantly impairs her ability to participate in MRADLs. She has unsteady gait, chronic dizziness, orthostatic hypotension, this limitation cannot be resolved with an appropriately fitted walker or cane. The patient resides in an snf and has adequate access between rooms and maneuvering space for a manual wheelchair. A manual wheelchair will significantly improve the patient's ability to participate in MRADLs and she will use it on a regular basis. The patient has not expressed an unwillingness to use the manual wheelchair, the patient has sufficient upper extremity function physical and mental capabilities to safely propel a manual wheelchair and has a caregiver who is available willing and able to provide assistance when the patient has limitations of upper extremity function. Patient is completing course in TCU, admitted to hospital following a fall with significant right hip pain and left chest wall pain. Advanced dementia remains unchanged, no behavioral abnormalities. Profound hearing loss, failed use of hearing aids in past, hearing deficit impairs ability to communicate. Course in TCU has included physical therapy, persistent orthostatic hypotensive changes, at times associated with dizziness, ace wraps have been applied to patient's lower extremities, no known falls have occurred while in TCU.     Review of systems: patient reports she is frustrated with her hearing loss. Anxious to return to her home however accurately recall where her home is. Frequently questions where she is currently located. Near-complete resolution of right hip pain, denies any chest wall pain. Denies focal  neurologic deficits. Spends her day reading and doing word searches. Remainder of 12 point review of systems obtained in entirety negative.    Exam: patient oriented times one, pleasant and cooperative, well groomed. Blood pressure 144/81, temperature 98.0, pulse 104, respiratory 18, 0298% on room air. Decreased hearing. No facial asymmetry. Pharynx without erythema. No carotid bruits. No temporal artery tenderness. S1 and S2 regular. Pulmonary exam without rales rhonchi or wheezes. Abdomen without masses tenderness organomegaly. Periphery with nonpitting edema of 2 mm. Strength and muscle tone diminished and symmetrical upper and lower extremities.    Impression and plan: advanced dementia without behavioral abnormalities. History of recurrent falls, potentially related to orthostatic hypotension, patient's blood pressure is stable, ace wraps have been applied to her legs, orthostasis continues but is improved, mitadine to be considered should patient's symptoms not resolve. Profound hearing loss, patient has failed use of hearing aids in the past. Generalized deconditioning with recurrent falls and dizziness, face to face documentation regarding need for wheelchair in skilled nursing setting. History of diabetes mellitus II. Patient will be discharging to [facility]. No change in discharge medications from those at admission. Total time of discharge evaluation greater than 35 minutes, greater than 50% of time spent in coordination of care and counseling.    Electronically signed by: Ivis Milner MD

## 2021-06-10 NOTE — PROGRESS NOTES
[facility]    Facility:   [facility] [[id number]]   Code Status: POLST AVAILABLE    Reevaluation of 89-year-old female with advanced dementia, history of recurrent falls, admitted to hospital following a fall, significant chest wall pain and hip pain with fall, no fractures, transferred to TCU for rehabilitation, observation of clinical status and management of medical problems which include hearing loss and orthostatic hypotension. Nursing staff report patient was found on floor 5/24, increased blood pressure and pulse at that time, no injury noted.    Review of systems: patient reports she recalls falling, denies any active pain. Has noted a bruise on her left upper arm in a rectangular pattern, questions how she might have obtained this bruise. Aware of hearing loss. Aware of memory loss. Is frustrated by her lack of memory. Repeatedly asks where she is and why she is here. Denies cardiac or pulmonary symptoms. No dizziness. Remainder of 12 point review of systems obtained negative.    Exam: patient sitting in wheelchair, oriented to person only, not to place or time, cooperative and anxious. Profound hearing loss which interferes with communication. Blood pressure 117/76 sitting, standing 92/65. No facial asymmetry. Pharynx without erythema. Thyroid without nodularity. S1 and S2 regular. Pulmonary exam without rales or wheezes. Superficial rectangular bruise left upper arm, no hematoma. Abdomen without masses or tenderness. Strength and muscle tone diminished and symmetrical upper and lower extremities. No tenderness of left chest wall which was previously present. Trace tenderness left greater trochanter bursa. Abdomen without masses or tenderness. Periphery with nonpitting edema 2 mm. No calf tenderness or swelling. Degenerative changes of knees moderate, no effusion or active synovitis.    Impression and plan: advanced dementia without behavioral abnormalities, patient has  insight into memory deficit and finds it to be frustrating. Orthostatic hypotension, not currently symptomatic, ace wraps have been applied to lower extremities, consideration of midodrine should sitting blood pressure diminish less than 95. Recently found on floor, no known injury, superficial bruise left upper arm without associated hematoma, shoulder elbow and wrist examination are on remarkable. Profound hearing deficit which interferes with communication. Significant deconditioning with ongoing need for rehabilitation. Care plan and medications are reviewed.      Electronically signed by: Ivis Milner MD

## 2021-06-10 NOTE — PROGRESS NOTES
[facility]    Facility:   [facility] [[id number]]   Code Status: POLST AVAILABLE      Reevaluation of patient with dementia, coronary artery disease, hypothyroidism on replacement, chronic hearing deficit, brought to emergency room 4/28 following a fall with complaints of left sided chest pain and right hip pain. X-rays negative for fracture, CT scan of head negative, transferred to TCU for rehabilitation, pain management, observation of clinical status.    Review of systems: patient complains of profound hearing loss. Notes are available from her daughter reminding patient of her past history, one note states the patient did try hearing aids in the past and could not tolerate them. Patient is anxious, wishes to walk around outside. Anxious to return to home setting. Asked repeatedly what happened to her hip, has no recall of hospitalization or of injuries sustained. Additionally does not know where she is staying at present. Highly distressed that her memory is so poor. No cardiac pulmonary or focal neurologic deficits. No active hip pain or chest wall pain. Remainder of 12 point review of systems obtained negative.    Exam: patient sitting in wheelchair, doing word finds. Oriented to person only, not to time or place.Profound hearing loss. No facial asymmetry. Pharynx without erythema. No carotid bruits. S1 and S2 regular. Pulmonary exam without rales rhonchi or wheezes. Periphery with nonpitting edema of 2 mm, strength and muscle tone -2 and symmetrical upper and lower extremities. No hand drift or tremor.    Impression and plan: advanced dementia, anxiety related to memory deficit, patient does have insight into memory deficit. Profound hearing loss which appears to be an old problem, no dizziness or tinnitus appreciated. Diabetes mellitus II diet controlled. Hypothyroidism on replacement. Significant deconditioning, ongoing need for rehabilitation. Recent left sided chest  wall pain and right hip pain, no complaints of pain at present, continue observation. Care plan and medications are reviewed.    Electronically signed by: Ivis Milner MD

## 2021-06-10 NOTE — PROGRESS NOTES
[facility]    Facility:   [facility] [[id number]]   Code Status: POLST AVAILABLE    Reevaluation of 89-year-old female admitted to hospital following a fall. Resides in [facility], history of recurrent falls, decreased hearing, recent evaluation by ENT, transferred to TCU for rehabilitation, management of medical problems which include orthostatic hypo tension, profound fatigue, anxiety.    Review of systems: patient continues to feel weak. Now wearing ace wraps to lower extremity, recent findings of significant orthostatic blood pressure changes. Denies headache. Persistent decreased hearing. Highly distraught that her memory is impaired. Believes however that memory may be improving over the past several days. Continued pain left lower rib margins gradually improving. Pain right hip gradually improving. Remainder of 12 point review of systems obtained negative.    Exam: vital signs reviewed over past 48 hours. Patient sitting in chair and doing crossword puzzles, quizzes the doctor on words. Decreased hearing. Vision intact. Anxious. Highly talkative. No facial asymmetry. No temporal artery tenderness. No TMJ tenderness. No carotid bruits. S1 and S2 regular. Pulmonary exam without rales or rhonchi. Abdomen without masses tenderness organomegaly. Periphery with ace wraps in place. Tender left lower rib margins, tender right hip. Muscle tone and strength diminished and symmetrical.    Impression and plan: dementia, no behavioral abnormalities. Patient has insight into her memory deficit and is highly troubled by it. Cognition improved in comparison to exam of 48 hours ago. Hearing loss, suggestive of ET dysfunction, patient was evaluated by ENT in hospital, temporal artery biopsy results have not arrived and will again be requested. No evidence clinically of vasculitis or temporal arteritis. Chest wall pain following fall, no breathing impairment. Right hip pain following fall, x-ray  negative for fracture. Orthostatic hypotension, blood pressure remains low, continuing on isosorbide 5 mg b.i.d., ace wraps in place lower extremities, continue to observe, midodrine if persistent hypotension.      Electronically signed by: Ivis Milner MD

## 2021-06-10 NOTE — PROGRESS NOTES
[facility]    Facility:   [facility] [[id number]]   Code Status: POLST AVAILABLE       Reevaluation of patient with advanced dementia who was admitted to hospital following a fall with right hip and chest wall pain, no fractures. Transferred to TCU for rehabilitation, observation of clinical status.    Review of systems: patient reports she is very confused, notes dizziness intermittently, unable to clearly or more precisely define her dizziness. No true vertiginous symptoms appear present. Believes her ears are plugged bilaterally, did have extensive ENT evaluation in hospital and temporal artery biopsy. Continues to have chest wall pain, increased pain with pressure on the area. Modest pain of the right hip. No exertional chest discomfort. Remainder of 12 point review of systems obtained negative.    Exam: patient initially lying supine, able to attain sitting position without complaints of dizziness. Oriented to person only, not to time or place. Highly concerned that she is disoriented. Able to follow commands. No carotid bruits. S1 and S2 regular. Pulmonary exam without rales rhonchi or wheezes. Abdomen without masses or tenderness. Periphery with nonpitting edema of 2 mm. No calf tenderness or swelling. Tenderness to palpation of costal chondral margins left lower ribs. Review of blood pressure show significant orthostatic changes sitting 111/64, standing 86/60 and 81/50.    Impression and plan: history of recurrent falls, patient has significant orthostatic blood pressure changes, is not on antihypertensives, begin ace wraps to lower extremity on a.m. off p.m., may require midodrine should orthostasis continue. Advanced dementia without behavioral abnormalities. Significant chest wall pain following fall without evidence of intra-thoracic trauma. Resolving right hip pain. Concerns reviewed with patient and reviewed with nursing staff.        Electronically signed by:  Ivis Milner MD

## 2021-06-10 NOTE — PROGRESS NOTES
[facility]    Facility:   [facility] [[id number]]   Code Status: POLST AVAILABLE    Reevaluation of 89-year-old female residing in [facility], brought to emergency room following a fall with complaints of chest pain and hip pain. X-rays negative for fracture. Significant headache while in hospital, temporal artery biopsy performed, results have not yet been located. Transferred to TCU for rehabilitation, observation of clinical status and management of medical problems. Nursing staff report patient continues to be highly confused at all times, frequently in bed.    Review of systems: patient highly confused, is troubled by her confusion, states she does not know where she is,  repeatedly asks why she is in this locale, states she knows she has been told correct answers however cannot remember them. Describes vague frontal headache, no focal neurologic deficits. Significant pain of the left lower ribs. Denies anterior chest discomfort. No orthopnea or PND. Nonspecific pain of the right hip. Generalized weakness present. Remainder of 12 point review of systems obtained negative.    Exam: vital signs reviewed over past 48 hours. 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 Frustrated regarding her inability to remember what has just been stated to her. Fretful. No facial asymmetry. No temporal artery tenderness. Mucous membranes moist. No carotid bruits or HJR. S1 and S2 regular with 2/6 systolic murmur. Exquisite tenderness to left lower rib margins, no rib abnormality to palpation. Pulmonary exam without extra sounds. Abdomen without masses or tenderness. Periphery without edema, strength and muscle tone diminished and symmetrical lower extremities.    Impression and plan: status post fall, significant chest wall pain, patient receiving Tylenol, Ultram is available on a PRN basis. Advanced dementia with significant  forgetfulness. No behavioral abnormalities. Recent significant headache, attempting to obtain temporal artery biopsy results, no symptoms to suggest a systemic vasculitis. Coronary artery disease without indication of angina. Hip pain post fall, negative fracture. Deconditioning with ongoing need for rehabilitation. Care plan and medications are reviewed      Electronically signed by: Ivis Milner MD

## 2022-07-12 NOTE — PROGRESS NOTES
"CLINICAL NUTRITION SERVICES  -  ASSESSMENT NOTE      Recommendations Ordered by Registered Dietitian (RD):   Medical Food Supplement: Ensure BID   Malnutrition: Non-Severe malnutrition  In Context of:  Acute illness or injury        REASON FOR ASSESSMENT  Olga Zelaya is a 89 year old female seen by Registered Dietitian for LOS      NUTRITION HISTORY  -Pt with hx of dementia and living assisted living facility in memory care unit. Per RN note, pt alert to self only. Unable to get full nutrition history at this time. Pt sleeping on attempt to see   -Pt with shellfish allergy     CURRENT NUTRITION ORDERS  Diet Order:     Low Saturated Fat/2400 mg Sodium     Current Intake/Tolerance:  -Per RN flowsheet, pt consuming 25%-50% of meals ordered   -1/16: Per RN note \"appetite poor on cardiac diet\"  -Per MD note - \"On 1/16 complaining of epigastric discomfort reproducible on palpation.\"  -Per RN notes, pt c/o of nausea - Zofran  -Wt fairly stable pt on lasix       PHYSICAL FINDINGS  Observed  Muscle Wasting   Subcutaneous fat loss   Obtained from Chart/Interdisciplinary Team  1/18: SLP - \"Pt presents with no appreciable dysphagia on limited clinical swallow evaluation\"  1/18: SLP - \"ST evaluation attempted, however, pt reported epigastric pain and adamantly refused PO at this time\"    ANTHROPOMETRICS  Height: 5'6\"  Weight: 141 lbs 3.2 oz (64kg)  Body mass index is 22.79 kg/(m^2).  Weight Status:  Normal BMI  IBW: 59 kg   % IBW: 108%  Weight History: wt loss of 2.8kg (4%) over the last month. Difficult to assess at this time due to fluid status and pt on lasix this admission   Wt Readings from Last 10 Encounters:   01/19/18 64 kg (141 lb 3.2 oz)   12/01/17 66.8 kg (147 lb 4.3 oz)   04/26/17 76.2 kg (167 lb 14.4 oz)   04/24/17 69.9 kg (154 lb)   08/09/13 54.4 kg (120 lb)   03/31/13 72.6 kg (160 lb)   03/31/13 72.6 kg (160 lb)   02/20/12 63.5 kg (140 lb)         LABS  Labs reviewed    MEDICATIONS  Medications " reviewed    Dosing Weight 64 kg (actual wt from 1/19)    ASSESSED NUTRITION NEEDS PER APPROVED PRACTICE GUIDELINES:  Estimated Energy Needs: 8526-5660 kcals (25-30 Kcal/Kg)  Justification: maintenance  Estimated Protein Needs: 77-96 grams protein (1.2-1.5 g pro/Kg)  Justification: preservation of lean body mass  Estimated Fluid Needs: 6532-1907 mL (25-30 mL/kg)  Justification: maintenance    MALNUTRITION:  % Weight Loss:  Weight loss does not meet criteria for malnutrition   % Intake: Unable to assess due to limited diet history   Subcutaneous Fat Loss:  Orbital region mild depletion  Muscle Loss:  Temporal region mild-moderate depletion, Clavicle bone region mild depletion and Acromion bone region mild depletion  Fluid Retention: right and left ankle and foot 1+ (trace) edema     Malnutrition Diagnosis: Non-Severe malnutrition  In Context of:  Acute illness or injury    NUTRITION DIAGNOSIS:  Inadequate oral intake related to decreased appetite and nausea as evidenced by RN reports of pt consuming 25%-50% of meals with c/o of nausea and poor appetite       NUTRITION INTERVENTIONS  Recommendations / Nutrition Prescription  Continue diet as ordered   Meals TID   Ensure BID     Implementation  Nutrition education: Not appropriate at this time due to patient condition  Medical Food Supplement: Ensure BID      Nutrition Goals  Pt to consume at least 75% of meals ordered TID and oral nutrition supplements BID       MONITORING AND EVALUATION:  Progress towards goals will be monitored and evaluated per protocol and Practice Guidelines      Jossie Hernandes RD, LD   Detail Level: Detailed

## 2024-02-29 NOTE — PLAN OF CARE
"Problem: Goal Outcome Summary  Goal: Goal Outcome Summary  Pt is limited by fatigue, deconditioning, generalized weakness, and is [place].  She completed functional transfers with CGA and FWW and ambulated with CGA and FWW. Pt intermittently reported feeling nauseated, having abdominal pain, and a \"tight\" feeling around her chest and RN notified.    If pt returns to [facility], would recommend 24hr assist with mobility, increased assist for ADLs, use of FWW at all times, and continued HHPT/OT services. If unable to meet pt's current needs, pt would need TCU.                        " No

## 2024-06-17 PROBLEM — Z71.89 ADVANCED DIRECTIVES, COUNSELING/DISCUSSION: Status: RESOLVED | Noted: 2017-08-04 | Resolved: 2024-06-17

## (undated) DEVICE — LINEN TOWEL PACK X5 5464

## (undated) DEVICE — SPONGE BALL KERLIX ROUND XL W/O STRING LATEX 4935

## (undated) DEVICE — SU VICRYL 3-0 SH 27" J316H

## (undated) DEVICE — SYR 10ML SLIP TIP W/O NDL

## (undated) DEVICE — PACK MINOR SBA15MIFSE

## (undated) DEVICE — ESU ELEC NDL 1" E1552

## (undated) DEVICE — NDL 27GA 1.25" 305136

## (undated) DEVICE — NDL 19GA 1.5"

## (undated) DEVICE — DECANTER VIAL 2006S

## (undated) DEVICE — SU VICRYL 5-0 P-3 18" UND J493H

## (undated) DEVICE — GLOVE PROTEXIS W/NEU-THERA 7.5  2D73TE75

## (undated) DEVICE — DRSG STERI STRIP 1/2X4" R1547

## (undated) DEVICE — ESU GROUND PAD UNIVERSAL W/O CORD

## (undated) DEVICE — SU SILK 4-0 TIE 24" SA73H

## (undated) RX ORDER — PROPOFOL 10 MG/ML
INJECTION, EMULSION INTRAVENOUS
Status: DISPENSED
Start: 2017-04-28

## (undated) RX ORDER — FENTANYL CITRATE 50 UG/ML
INJECTION, SOLUTION INTRAMUSCULAR; INTRAVENOUS
Status: DISPENSED
Start: 2017-04-28

## (undated) RX ORDER — LIDOCAINE HYDROCHLORIDE 20 MG/ML
INJECTION, SOLUTION EPIDURAL; INFILTRATION; INTRACAUDAL; PERINEURAL
Status: DISPENSED
Start: 2017-04-28

## (undated) RX ORDER — BUPIVACAINE HYDROCHLORIDE 5 MG/ML
INJECTION, SOLUTION EPIDURAL; INTRACAUDAL
Status: DISPENSED
Start: 2017-04-28

## (undated) RX ORDER — LIDOCAINE HYDROCHLORIDE 10 MG/ML
INJECTION, SOLUTION INFILTRATION; PERINEURAL
Status: DISPENSED
Start: 2017-04-28